# Patient Record
Sex: FEMALE | Race: WHITE | NOT HISPANIC OR LATINO | Employment: OTHER | URBAN - METROPOLITAN AREA
[De-identification: names, ages, dates, MRNs, and addresses within clinical notes are randomized per-mention and may not be internally consistent; named-entity substitution may affect disease eponyms.]

---

## 2017-06-24 ENCOUNTER — COMMUNICATION - HEALTHEAST (OUTPATIENT)
Dept: SCHEDULING | Facility: CLINIC | Age: 82
End: 2017-06-24

## 2017-06-24 DIAGNOSIS — I10 UNSPECIFIED ESSENTIAL HYPERTENSION: ICD-10-CM

## 2017-06-27 ENCOUNTER — COMMUNICATION - HEALTHEAST (OUTPATIENT)
Dept: INTERNAL MEDICINE | Facility: CLINIC | Age: 82
End: 2017-06-27

## 2017-06-29 ENCOUNTER — COMMUNICATION - HEALTHEAST (OUTPATIENT)
Dept: INTERNAL MEDICINE | Facility: CLINIC | Age: 82
End: 2017-06-29

## 2017-06-29 DIAGNOSIS — E78.5 HYPERLIPIDEMIA: ICD-10-CM

## 2017-07-06 ENCOUNTER — COMMUNICATION - HEALTHEAST (OUTPATIENT)
Dept: INTERNAL MEDICINE | Facility: CLINIC | Age: 82
End: 2017-07-06

## 2017-07-06 DIAGNOSIS — I10 ESSENTIAL HYPERTENSION: ICD-10-CM

## 2017-07-06 DIAGNOSIS — E78.5 HYPERLIPIDEMIA: ICD-10-CM

## 2017-10-06 ENCOUNTER — COMMUNICATION - HEALTHEAST (OUTPATIENT)
Dept: INTERNAL MEDICINE | Facility: CLINIC | Age: 82
End: 2017-10-06

## 2017-10-06 DIAGNOSIS — E78.5 HYPERLIPIDEMIA: ICD-10-CM

## 2017-10-17 ENCOUNTER — COMMUNICATION - HEALTHEAST (OUTPATIENT)
Dept: SCHEDULING | Facility: CLINIC | Age: 82
End: 2017-10-17

## 2017-10-17 DIAGNOSIS — I10 ESSENTIAL HYPERTENSION: ICD-10-CM

## 2017-12-08 ENCOUNTER — COMMUNICATION - HEALTHEAST (OUTPATIENT)
Dept: INTERNAL MEDICINE | Facility: CLINIC | Age: 82
End: 2017-12-08

## 2017-12-08 ENCOUNTER — AMBULATORY - HEALTHEAST (OUTPATIENT)
Dept: INTERNAL MEDICINE | Facility: CLINIC | Age: 82
End: 2017-12-08

## 2017-12-08 DIAGNOSIS — E78.5 HYPERLIPIDEMIA: ICD-10-CM

## 2018-06-06 ENCOUNTER — COMMUNICATION - HEALTHEAST (OUTPATIENT)
Dept: INTERNAL MEDICINE | Facility: CLINIC | Age: 83
End: 2018-06-06

## 2018-06-06 DIAGNOSIS — I10 ESSENTIAL HYPERTENSION: ICD-10-CM

## 2018-06-08 ENCOUNTER — COMMUNICATION - HEALTHEAST (OUTPATIENT)
Dept: INTERNAL MEDICINE | Facility: CLINIC | Age: 83
End: 2018-06-08

## 2018-06-11 ENCOUNTER — OFFICE VISIT - HEALTHEAST (OUTPATIENT)
Dept: INTERNAL MEDICINE | Facility: CLINIC | Age: 83
End: 2018-06-11

## 2018-06-11 DIAGNOSIS — I10 ESSENTIAL HYPERTENSION: ICD-10-CM

## 2018-06-11 DIAGNOSIS — G31.84 MILD COGNITIVE IMPAIRMENT WITH MEMORY LOSS: ICD-10-CM

## 2018-06-11 DIAGNOSIS — Z00.01 ENCOUNTER FOR GENERAL ADULT MEDICAL EXAMINATION WITH ABNORMAL FINDINGS: ICD-10-CM

## 2018-06-11 DIAGNOSIS — Z78.0 MENOPAUSE: ICD-10-CM

## 2018-06-11 DIAGNOSIS — E78.2 MIXED HYPERLIPIDEMIA: ICD-10-CM

## 2018-06-11 DIAGNOSIS — E55.9 VITAMIN D DEFICIENCY: ICD-10-CM

## 2018-06-11 LAB
ALBUMIN SERPL-MCNC: 4.2 G/DL (ref 3.5–5)
ALBUMIN UR-MCNC: NEGATIVE MG/DL
ALP SERPL-CCNC: 77 U/L (ref 45–120)
ALT SERPL W P-5'-P-CCNC: 23 U/L (ref 0–45)
ANION GAP SERPL CALCULATED.3IONS-SCNC: 6 MMOL/L (ref 5–18)
APPEARANCE UR: CLEAR
AST SERPL W P-5'-P-CCNC: 22 U/L (ref 0–40)
BILIRUB DIRECT SERPL-MCNC: 0.2 MG/DL
BILIRUB SERPL-MCNC: 0.6 MG/DL (ref 0–1)
BILIRUB UR QL STRIP: NEGATIVE
BUN SERPL-MCNC: 13 MG/DL (ref 8–28)
CALCIUM SERPL-MCNC: 9.7 MG/DL (ref 8.5–10.5)
CHLORIDE BLD-SCNC: 106 MMOL/L (ref 98–107)
CHOLEST SERPL-MCNC: 144 MG/DL
CO2 SERPL-SCNC: 29 MMOL/L (ref 22–31)
COLOR UR AUTO: YELLOW
CREAT SERPL-MCNC: 0.71 MG/DL (ref 0.6–1.1)
ERYTHROCYTE [DISTWIDTH] IN BLOOD BY AUTOMATED COUNT: 11.4 % (ref 11–14.5)
FASTING STATUS PATIENT QL REPORTED: YES
GFR SERPL CREATININE-BSD FRML MDRD: >60 ML/MIN/1.73M2
GLUCOSE BLD-MCNC: 98 MG/DL (ref 70–125)
GLUCOSE UR STRIP-MCNC: NEGATIVE MG/DL
HCT VFR BLD AUTO: 44.2 % (ref 35–47)
HDLC SERPL-MCNC: 53 MG/DL
HGB BLD-MCNC: 15.1 G/DL (ref 12–16)
HGB UR QL STRIP: NEGATIVE
KETONES UR STRIP-MCNC: NEGATIVE MG/DL
LDLC SERPL CALC-MCNC: 65 MG/DL
LEUKOCYTE ESTERASE UR QL STRIP: NEGATIVE
MCH RBC QN AUTO: 29.9 PG (ref 27–34)
MCHC RBC AUTO-ENTMCNC: 34.1 G/DL (ref 32–36)
MCV RBC AUTO: 88 FL (ref 80–100)
NITRATE UR QL: NEGATIVE
PH UR STRIP: 6.5 [PH] (ref 5–8)
PLATELET # BLD AUTO: 217 THOU/UL (ref 140–440)
PMV BLD AUTO: 7.8 FL (ref 7–10)
POTASSIUM BLD-SCNC: 4.7 MMOL/L (ref 3.5–5)
PROT SERPL-MCNC: 6.8 G/DL (ref 6–8)
RBC # BLD AUTO: 5.04 MILL/UL (ref 3.8–5.4)
SODIUM SERPL-SCNC: 141 MMOL/L (ref 136–145)
SP GR UR STRIP: 1.01 (ref 1–1.03)
TRIGL SERPL-MCNC: 130 MG/DL
TSH SERPL DL<=0.005 MIU/L-ACNC: 3.49 UIU/ML (ref 0.3–5)
UROBILINOGEN UR STRIP-ACNC: NORMAL
VIT B12 SERPL-MCNC: 484 PG/ML (ref 213–816)
WBC: 4.4 THOU/UL (ref 4–11)

## 2018-06-11 ASSESSMENT — MIFFLIN-ST. JEOR: SCORE: 1079.21

## 2018-06-12 ENCOUNTER — COMMUNICATION - HEALTHEAST (OUTPATIENT)
Dept: INTERNAL MEDICINE | Facility: CLINIC | Age: 83
End: 2018-06-12

## 2018-06-12 LAB — 25(OH)D3 SERPL-MCNC: 34.6 NG/ML (ref 30–80)

## 2018-06-22 ENCOUNTER — RECORDS - HEALTHEAST (OUTPATIENT)
Dept: BONE DENSITY | Facility: CLINIC | Age: 83
End: 2018-06-22

## 2018-06-22 ENCOUNTER — RECORDS - HEALTHEAST (OUTPATIENT)
Dept: ADMINISTRATIVE | Facility: OTHER | Age: 83
End: 2018-06-22

## 2018-06-22 DIAGNOSIS — Z78.0 ASYMPTOMATIC MENOPAUSAL STATE: ICD-10-CM

## 2018-06-25 ENCOUNTER — COMMUNICATION - HEALTHEAST (OUTPATIENT)
Dept: INTERNAL MEDICINE | Facility: CLINIC | Age: 83
End: 2018-06-25

## 2018-12-28 ENCOUNTER — COMMUNICATION - HEALTHEAST (OUTPATIENT)
Dept: INTERNAL MEDICINE | Facility: CLINIC | Age: 83
End: 2018-12-28

## 2018-12-28 DIAGNOSIS — E78.5 HYPERLIPIDEMIA: ICD-10-CM

## 2019-08-01 ENCOUNTER — OFFICE VISIT (OUTPATIENT)
Dept: FAMILY MEDICINE | Facility: CLINIC | Age: 84
End: 2019-08-01
Payer: MEDICARE

## 2019-08-01 VITALS
WEIGHT: 136 LBS | TEMPERATURE: 97.2 F | BODY MASS INDEX: 21.86 KG/M2 | SYSTOLIC BLOOD PRESSURE: 134 MMHG | OXYGEN SATURATION: 97 % | DIASTOLIC BLOOD PRESSURE: 81 MMHG | HEIGHT: 66 IN | HEART RATE: 77 BPM

## 2019-08-01 DIAGNOSIS — R41.89 COGNITIVE DECLINE: ICD-10-CM

## 2019-08-01 DIAGNOSIS — E78.5 HYPERLIPIDEMIA LDL GOAL <100: Primary | ICD-10-CM

## 2019-08-01 DIAGNOSIS — H61.23 BILATERAL IMPACTED CERUMEN: ICD-10-CM

## 2019-08-01 PROCEDURE — 99203 OFFICE O/P NEW LOW 30 MIN: CPT | Mod: 25 | Performed by: NURSE PRACTITIONER

## 2019-08-01 PROCEDURE — 69209 REMOVE IMPACTED EAR WAX UNI: CPT | Performed by: NURSE PRACTITIONER

## 2019-08-01 RX ORDER — LATANOPROST 50 UG/ML
SOLUTION/ DROPS OPHTHALMIC
Refills: 3 | COMMUNITY
Start: 2019-07-08

## 2019-08-01 RX ORDER — ALCOHOL 70.47 ML/100ML
1 GEL TOPICAL DAILY
COMMUNITY

## 2019-08-01 RX ORDER — ATORVASTATIN CALCIUM 40 MG/1
20 TABLET, FILM COATED ORAL
COMMUNITY
Start: 2018-12-28 | End: 2019-08-01

## 2019-08-01 RX ORDER — ATORVASTATIN CALCIUM 40 MG/1
20 TABLET, FILM COATED ORAL DAILY
Qty: 90 TABLET | Refills: 0 | Status: SHIPPED | OUTPATIENT
Start: 2019-08-01 | End: 2022-01-06

## 2019-08-01 RX ORDER — DORZOLAMIDE HYDROCHLORIDE AND TIMOLOL MALEATE 20; 5 MG/ML; MG/ML
1 SOLUTION/ DROPS OPHTHALMIC 2 TIMES DAILY
Refills: 1 | COMMUNITY
Start: 2019-03-30

## 2019-08-01 ASSESSMENT — MIFFLIN-ST. JEOR: SCORE: 1092.61

## 2019-08-01 NOTE — PROGRESS NOTES
"Subjective     Khloe Sparks is a 83 year old female who presents to clinic today for the following health issues:    HPI   Medication Followup of Atorvastatin 40mg 1/2 daily    Taking Medication as prescribed: yes    Side Effects:  None    Medication Helping Symptoms:  yes     Khloe is needing to re establish with a PCP and will schedule that appt before leaving today  She has some confusion about location of her current pharmacy - \"memory's not too good\"  Currently lives with adult son who drives her to appts - he works weekends  Wants to move into [address] [place] [address]- encouraged to sign up now- friends live there  Bowls on Wed          Patient Active Problem List   Diagnosis     Aftercare for healing traumatic fracture of upper arm     Pain in joint, shoulder region     History reviewed. No pertinent surgical history.    Social History     Tobacco Use     Smoking status: Former Smoker     Smokeless tobacco: Never Used     Tobacco comment: quit 1982    Substance Use Topics     Alcohol use: Yes     Comment: wine occassionally     History reviewed. No pertinent family history.      Current Outpatient Medications   Medication Sig Dispense Refill     atorvastatin (LIPITOR) 40 MG tablet Take 0.5 tablets (20 mg) by mouth daily 90 tablet 0     dorzolamide-timolol (COSOPT) 2-0.5 % ophthalmic solution   1     latanoprost (XALATAN) 0.005 % ophthalmic solution instill 1 drop in both eyes at bedtime  3     LISINOPRIL PO Take 10 mg by mouth daily.       multivitamin (THERMEMS) TABS Take 1 tablet by mouth       No Known Allergies    Reviewed and updated as needed this visit by Provider         Review of Systems   ROS COMP: Constitutional, HEENT, cardiovascular, pulmonary, gi and gu systems are negative, except as otherwise noted.        Objective       /81 (BP Location: Right arm, Cuff Size: Adult Regular)   Pulse 77   Temp 97.2  F (36.2  C) (Tympanic)   Ht 1.683 m (5' 6.25\")   Wt 61.7 kg (136 lb)   SpO2 97%   BMI 21.79 " kg/m      Physical Exam   GENERAL: healthy, alert and no distress  EYES: Eyes grossly normal to inspection, PERRL and conjunctivae and sclerae normal  HENT: ear canals cerumen occluded and lavaged by MA and TM's normal, nose and mouth without ulcers or lesions  NECK: no adenopathy, no asymmetry, masses, or scars and thyroid normal to palpation  RESP: lungs clear to auscultation - no rales, rhonchi or wheezes  CV: regular rate and rhythm, normal S1 S2, no S3 or S4, no murmur, click or rub, no peripheral edema and peripheral pulses strong  MS: no gross musculoskeletal defects noted, no edema  PSYCH: mild cognitive decline    Diagnostic Test Results:  Labs reviewed in Epic        Assessment & Plan       ICD-10-CM    1. Hyperlipidemia LDL goal <100 E78.5 atorvastatin (LIPITOR) 40 MG tablet     Lipid panel reflex to direct LDL Non-fasting   2. Bilateral impacted cerumen H61.23 REMOVE IMPACTED CERUMEN   3. Cognitive decline R41.89      RAVINDRA Capps [facility]

## 2019-08-09 NOTE — NURSING NOTE
Patient identified using two patient identifiers.  Ear exam showing wax occlusion completed by provider.  Solution: warm water was placed in the bilateral ear(s) via irrigation tool: elephant ear.    Ruthie Rodriguez MA

## 2019-11-26 ENCOUNTER — OFFICE VISIT - HEALTHEAST (OUTPATIENT)
Dept: INTERNAL MEDICINE | Facility: CLINIC | Age: 84
End: 2019-11-26

## 2019-11-26 DIAGNOSIS — I10 ESSENTIAL HYPERTENSION: ICD-10-CM

## 2019-11-26 DIAGNOSIS — R41.3 MEMORY LOSS: ICD-10-CM

## 2019-11-26 DIAGNOSIS — E78.5 HYPERLIPIDEMIA: ICD-10-CM

## 2019-11-26 DIAGNOSIS — R21 RASH: ICD-10-CM

## 2019-11-26 LAB
ALBUMIN SERPL-MCNC: 4.2 G/DL (ref 3.5–5)
ALP SERPL-CCNC: 85 U/L (ref 45–120)
ALT SERPL W P-5'-P-CCNC: 20 U/L (ref 0–45)
ANION GAP SERPL CALCULATED.3IONS-SCNC: 10 MMOL/L (ref 5–18)
AST SERPL W P-5'-P-CCNC: 20 U/L (ref 0–40)
BILIRUB SERPL-MCNC: 0.4 MG/DL (ref 0–1)
BUN SERPL-MCNC: 9 MG/DL (ref 8–28)
CALCIUM SERPL-MCNC: 9.6 MG/DL (ref 8.5–10.5)
CHLORIDE BLD-SCNC: 104 MMOL/L (ref 98–107)
CO2 SERPL-SCNC: 25 MMOL/L (ref 22–31)
CREAT SERPL-MCNC: 0.73 MG/DL (ref 0.6–1.1)
ERYTHROCYTE [DISTWIDTH] IN BLOOD BY AUTOMATED COUNT: 12.7 % (ref 11–14.5)
GFR SERPL CREATININE-BSD FRML MDRD: >60 ML/MIN/1.73M2
GLUCOSE BLD-MCNC: 99 MG/DL (ref 70–125)
HCT VFR BLD AUTO: 45.2 % (ref 35–47)
HGB BLD-MCNC: 14.7 G/DL (ref 12–16)
MCH RBC QN AUTO: 29 PG (ref 27–34)
MCHC RBC AUTO-ENTMCNC: 32.5 G/DL (ref 32–36)
MCV RBC AUTO: 89 FL (ref 80–100)
PLATELET # BLD AUTO: 251 THOU/UL (ref 140–440)
PMV BLD AUTO: 10.6 FL (ref 8.5–12.5)
POTASSIUM BLD-SCNC: 4.8 MMOL/L (ref 3.5–5)
PROT SERPL-MCNC: 6.6 G/DL (ref 6–8)
RBC # BLD AUTO: 5.07 MILL/UL (ref 3.8–5.4)
SODIUM SERPL-SCNC: 139 MMOL/L (ref 136–145)
TSH SERPL DL<=0.005 MIU/L-ACNC: 3.62 UIU/ML (ref 0.3–5)
VIT B12 SERPL-MCNC: 576 PG/ML (ref 213–816)
WBC: 4.8 THOU/UL (ref 4–11)

## 2019-11-26 ASSESSMENT — MIFFLIN-ST. JEOR: SCORE: 1078.64

## 2019-11-27 ENCOUNTER — COMMUNICATION - HEALTHEAST (OUTPATIENT)
Dept: INTERNAL MEDICINE | Facility: CLINIC | Age: 84
End: 2019-11-27

## 2020-11-28 ENCOUNTER — COMMUNICATION - HEALTHEAST (OUTPATIENT)
Dept: INTERNAL MEDICINE | Facility: CLINIC | Age: 85
End: 2020-11-28

## 2020-11-28 DIAGNOSIS — I10 ESSENTIAL HYPERTENSION: ICD-10-CM

## 2020-12-07 ENCOUNTER — COMMUNICATION - HEALTHEAST (OUTPATIENT)
Dept: INTERNAL MEDICINE | Facility: CLINIC | Age: 85
End: 2020-12-07

## 2020-12-07 DIAGNOSIS — E78.5 HYPERLIPIDEMIA: ICD-10-CM

## 2020-12-07 DIAGNOSIS — R41.3 MEMORY LOSS: ICD-10-CM

## 2021-05-29 ENCOUNTER — RECORDS - HEALTHEAST (OUTPATIENT)
Dept: ADMINISTRATIVE | Facility: CLINIC | Age: 86
End: 2021-05-29

## 2021-05-31 ENCOUNTER — RECORDS - HEALTHEAST (OUTPATIENT)
Dept: ADMINISTRATIVE | Facility: CLINIC | Age: 86
End: 2021-05-31

## 2021-06-01 VITALS — WEIGHT: 136.13 LBS | HEIGHT: 66 IN | BODY MASS INDEX: 21.88 KG/M2

## 2021-06-02 ENCOUNTER — RECORDS - HEALTHEAST (OUTPATIENT)
Dept: ADMINISTRATIVE | Facility: CLINIC | Age: 86
End: 2021-06-02

## 2021-06-03 NOTE — PROGRESS NOTES
Office Visit   Khloe Sparks   84 y.o. female    Date of Visit: 11/26/2019    Chief Complaint   Patient presents with     Get established visit        Assessment and Plan   1. Essential hypertension  She has not been taking her blood pressure medicine.  I am going to restart the lisinopril at 10 mg daily.  We will also check blood work today.  We will see her back in about 6 weeks for an annual physical and reassess at that time.  She is not having any cardiac symptoms.  - lisinopril (PRINIVIL,ZESTRIL) 10 MG tablet; Take 1 tablet (10 mg total) by mouth daily.  Dispense: 90 tablet; Refill: 3  - HM2(CBC w/o Differential)  - Comprehensive Metabolic Panel  - Thyroid Cascade  - Vitamin B12    2. Hyperlipidemia  Have advised her to come back for her physical in 6 weeks fasting.  We will need to recheck her lipids at that time.  - atorvastatin (LIPITOR) 40 MG tablet; Take 0.5 tablets (20 mg total) by mouth daily.  Dispense: 45 tablet; Refill: 3    3. Memory loss  Per her son this seems to be worsening.  She has not been on medication in the past but would like to try the donepezil.  I am also going to refer her to the dementia clinic and recheck some blood work today.  Will get back to her with the results.  - donepezil (ARICEPT) 5 MG tablet; Take 1 tablet (5 mg total) by mouth daily.  Dispense: 90 tablet; Refill: 3  - Ambulatory referral to Dementia/Memory Loss Clinic  - HM2(CBC w/o Differential)  - Comprehensive Metabolic Panel  - Thyroid Cascade  - Vitamin B12    4. Rash  We will reassess at her follow-up.  If is not improving then may need to have her see dermatology.  - betamethasone dipropionate (DIPROLENE) 0.05 % cream; Apply topically 2 (two) times a day. For rash  Dispense: 30 g; Refill: 5         Return in about 6 weeks (around 1/7/2020) for Annual physical.     History of Present Illness   This 84 y.o. old female comes in to establish care.  She has a history of hypertension and hyperlipidemia.  She has no  "history of cardiac disease.  She has not been getting her blood pressure medicine recently and her blood pressure is a little bit elevated today.  She has not had any chest pain or palpitations but would like to get back on her medicine.  Her son notes that they have noticed increasing memory loss.  In the past she was prescribed Aricept but did not start it.  She is agreeable to starting it again at this time.  We also discussed further evaluation of her memory loss and they would like to proceed with that.  She has a rash on her legs that has been improved in the past with betamethasone.  She is interested in getting a refill on that.  She has no other acute concerns today.  Otherwise she stays quite active.    Review of Systems: As above, systems otherwise reviewed and negative.     Medications, Allergies and Problem List   Patient Active Problem List   Diagnosis     Osteopenia     Hypertension     Hyperlipidemia     Unspecified glaucoma     Memory loss     Current Outpatient Medications   Medication Sig Dispense Refill     atorvastatin (LIPITOR) 40 MG tablet Take 0.5 tablets (20 mg total) by mouth daily. 45 tablet 3     betamethasone dipropionate (DIPROLENE) 0.05 % cream Apply topically 2 (two) times a day. For rash 30 g 5     donepezil (ARICEPT) 5 MG tablet Take 1 tablet (5 mg total) by mouth daily. 90 tablet 3     lisinopril (PRINIVIL,ZESTRIL) 10 MG tablet Take 1 tablet (10 mg total) by mouth daily. 90 tablet 3     multivitamin therapeutic (THERAGRAN) tablet Take 1 tablet by mouth daily.       timolol maleate (TIMOPTIC) 0.5 % ophthalmic solution        No current facility-administered medications for this visit.      No Known Allergies       Physical Exam     /80   Pulse 67   Ht 5' 6\" (1.676 m)   Wt 136 lb (61.7 kg)   BMI 21.95 kg/m      General:  Patient is alert and in no apparent distress.  Neck:  Supple with no adenopathy or thyroid abnormality noted.  Cardiovascular:  Regular rate and rhythm, " normal S1/S2, no murmurs, rubs, or gallop.  Pulmonary:  Lungs are clear to auscultation bilaterally with normal respiratory effort.  Gastrointestinal:  Abdomen is soft, non-tender, non-distended, with no organomegaly, rebound or guarding.           Additional Information   Social History     Tobacco Use     Smoking status: Former Smoker     Last attempt to quit: 1975     Years since quittin.3     Smokeless tobacco: Never Used   Substance Use Topics     Alcohol use: Yes     Comment: glass per week     Drug use: No        Gina Jackson MD

## 2021-06-04 VITALS
HEART RATE: 67 BPM | SYSTOLIC BLOOD PRESSURE: 142 MMHG | WEIGHT: 136 LBS | BODY MASS INDEX: 21.86 KG/M2 | HEIGHT: 66 IN | DIASTOLIC BLOOD PRESSURE: 80 MMHG

## 2021-06-13 NOTE — TELEPHONE ENCOUNTER
RN cannot approve Refill Request    RN can NOT refill this medication PCP messaged that patient is overdue for Labs. Last office visit: 11/26/2019 Gina Jackson MD Last Physical: Visit date not found Last MTM visit: Visit date not found Last visit same specialty: 11/26/2019 Gina Jackson MD.  Next visit within 3 mo: Visit date not found  Next physical within 3 mo: Visit date not found      Lavonne Workman, [facility] Triage/Med Refill 11/28/2020    Requested Prescriptions   Pending Prescriptions Disp Refills     lisinopriL (PRINIVIL,ZESTRIL) 10 MG tablet [Pharmacy Med Name: Lisinopril Oral Tablet 10 MG] 90 tablet 0     Sig: Take 1 tablet (10 mg total) by mouth daily.       Ace Inhibitors Refill Protocol Failed - 11/28/2020  7:20 PM        Failed - PCP or prescribing provider visit in past 12 months       Last office visit with prescriber/PCP: 11/26/2019 Gina Jackson MD OR same dept: Visit date not found OR same specialty: 11/26/2019 Gina Jackson MD  Last physical: Visit date not found Last MTM visit: Visit date not found   Next visit within 3 mo: Visit date not found  Next physical within 3 mo: Visit date not found  Prescriber OR PCP: Gina Jackson MD  Last diagnosis associated with med order: 1. Essential hypertension  - lisinopriL (PRINIVIL,ZESTRIL) 10 MG tablet [Pharmacy Med Name: Lisinopril Oral Tablet 10 MG]; Take 1 tablet (10 mg total) by mouth daily.  Dispense: 90 tablet; Refill: 0    If protocol passes may refill for 12 months if within 3 months of last provider visit (or a total of 15 months).             Failed - Serum Potassium in past 12 months     No results found for: LN-POTASSIUM          Failed - Blood pressure filed in past 12 months     BP Readings from Last 1 Encounters:   11/26/19 142/80             Failed - Serum Creatinine in past 12 months     Creatinine   Date Value Ref Range Status   11/26/2019 0.73 0.60 - 1.10 mg/dL Final

## 2021-06-13 NOTE — TELEPHONE ENCOUNTER
RN cannot approve Refill Request    RN can NOT refill this medication Protocol failed and NO refill given. Last office visit: 11/26/2019 Gina Jackson MD Last Physical: Visit date not found Last MTM visit: Visit date not found Last visit same specialty: 11/26/2019 Gina Jackson MD.  Next visit within 3 mo: Visit date not found  Next physical within 3 mo: Visit date not found      Cori Pinzon, [facility] Triage/Med Refill 12/8/2020    Requested Prescriptions   Pending Prescriptions Disp Refills     atorvastatin (LIPITOR) 40 MG tablet [Pharmacy Med Name: Atorvastatin Calcium Oral Tablet 40 MG] 45 tablet 0     Sig: Take 1/2 tablets (20 mg total) by mouth daily.       Statins Refill Protocol (Hmg CoA Reductase Inhibitors) Failed - 12/7/2020  7:02 PM        Failed - PCP or prescribing provider visit in past 12 months      Last office visit with prescriber/PCP: 11/26/2019 Gina Jackson MD OR same dept: Visit date not found OR same specialty: 11/26/2019 Gina Jackson MD  Last physical: Visit date not found Last MTM visit: Visit date not found   Next visit within 3 mo: Visit date not found  Next physical within 3 mo: Visit date not found  Prescriber OR PCP: Gina Jackson MD  Last diagnosis associated with med order: 1. Hyperlipidemia  - atorvastatin (LIPITOR) 40 MG tablet [Pharmacy Med Name: Atorvastatin Calcium Oral Tablet 40 MG]; Take 1/2 tablets (20 mg total) by mouth daily.  Dispense: 45 tablet; Refill: 0    2. Memory loss  - donepeziL (ARICEPT) 5 MG tablet [Pharmacy Med Name: Donepezil HCl Oral Tablet 5 MG]; Take 1 tablet (5 mg total) by mouth daily.  Dispense: 90 tablet; Refill: 0    If protocol passes may refill for 12 months if within 3 months of last provider visit (or a total of 15 months).                donepeziL (ARICEPT) 5 MG tablet [Pharmacy Med Name: Donepezil HCl Oral Tablet 5 MG] 90 tablet 0     Sig: Take 1 tablet (5 mg total) by mouth daily.       Cholinesterase  Inhibitor/Anti-Alzheimer Agent Refill Protocol Failed - 12/7/2020  7:02 PM        Failed - Visit with PCP or prescribing provider visit in last 6 months or next 3 months     Last office visit with prescriber/PCP: Visit date not found OR same dept: Visit date not found OR same specialty: 11/26/2019 Gina Jackson MD Last physical: Visit date not found Last MTM visit: Visit date not found     Next appt within 3 mo: Visit date not found  Next physical within 3 mo: Visit date not found  Prescriber OR PCP: Gina Jackson MD  Last diagnosis associated with med order: 1. Hyperlipidemia  - atorvastatin (LIPITOR) 40 MG tablet [Pharmacy Med Name: Atorvastatin Calcium Oral Tablet 40 MG]; Take 1/2 tablets (20 mg total) by mouth daily.  Dispense: 45 tablet; Refill: 0    2. Memory loss  - donepeziL (ARICEPT) 5 MG tablet [Pharmacy Med Name: Donepezil HCl Oral Tablet 5 MG]; Take 1 tablet (5 mg total) by mouth daily.  Dispense: 90 tablet; Refill: 0    If protocol passes may refill for 6 months if within 3 months of last provider visit (or a total of 9 months).

## 2021-06-18 NOTE — PROGRESS NOTES
Assessment and Plan:   Overall doing fairly well since her   a little over 3 years ago but admits she misses him.  1 of her sons lives with her.  She admits her memory is not as good as it used to be.  She mainly forgets the names of her neighbors.    1. Hypertension  Stable on medication.  - Basic Metabolic Panel  - HM2(CBC w/o Differential)  - Thyroid Stimulating Hormone (TSH)  - Urinalysis-UC if Indicated    2. Mixed hyperlipidemia  She is on atorvastatin.  - Hepatic Profile  - Lipid Cascade    3. Menopause  Due for a bone density test.  - DXA Bone Density Scan; Future    4. Vitamin D deficiency  - Vitamin D, Total (25-Hydroxy)    5. Mild cognitive impairment with memory loss  She agrees to starting on donepezil, she was started at 5 mg daily.  Follow-up with me in 6 weeks.  - Vitamin B12    6. Encounter for general adult medical examination with abnormal findings     The patient's current medical problems were reviewed.    The following health maintenance schedule was reviewed with the patient and provided in printed form in the after visit summary:   Health Maintenance   Topic Date Due     ZOSTER VACCINE  10/19/1995     DXA SCAN  10/19/2000     PNEUMOCOCCAL CONJUGATE VACCINE FOR ADULTS (PCV13 OR PREVNAR)  10/19/2000     TD 18+ HE  2018     INFLUENZA VACCINE RULE BASED (Season Ended) 2018     FALL RISK ASSESSMENT  2019     ADVANCE DIRECTIVES DISCUSSED WITH PATIENT  2020     PNEUMOCOCCAL POLYSACCHARIDE VACCINE AGE 65 AND OVER  Completed        Subjective:   Chief Complaint: Khloe Sparks is an 82 y.o. female here for an Annual Wellness visit.   HPI: Khloe is doing fairly well.  She does admit she has had some mild memory loss, mainly names.  She enjoys gardening and working outside.  She remains active.  She does not drive anymore.    Review of Systems:   Please see above.  The rest of the review of systems are negative for all systems.    Patient Care Team:    MD Bhupendra as PCP - General     Patient Active Problem List   Diagnosis     Basal Cell Carcinoma Of The Skin     Osteopenia     Hypertension     Mixed hyperlipidemia     Glaucoma     Lichen Planus     No past medical history on file.   Past Surgical History:   Procedure Laterality Date     [place] CLOSED RX DIST RAD/ULNA FX,MANIPUL      Description: Closed Treatment Of Fracture Of Distal Radius, Manipulation;  Proc Date: 2002;  Comments: fell from ladder     [place] CLOSED RX MID HUMERUS FRACTURE      Description: Closed Treat Of Fracture Of The Humeral Shaft;  Proc Date: 2007;  Comments: fell from wall     [place] REMOVE TONSILS/ADENOIDS,<13 Y/O      Description: Tonsillectomy With Adenoidectomy;  Recorded: 2010;      Family History   Problem Relation Age of Onset     Breast cancer Sister       of this but had MS as well     Pancreatic cancer Sister       age 79     Multiple sclerosis Mother       age 80     Pancreatic cancer Father       age 70     Cancer Brother      laryngeal cancer  in his 60s      Social History     Social History     Marital status:      Spouse name: N/A     Number of children: N/A     Years of education: N/A     Occupational History     Not on file.     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. She was  for almost 60 years to Eric and he  3/2016 (he was 83) while taking a nap next to her.  She has 3 boys, and 4 grandchildren.  She has a glass of red wine almost daily.  One son lives with her.      Current Outpatient Prescriptions   Medication Sig Dispense Refill     atorvastatin (LIPITOR) 40 MG tablet Take 0.5 tablets (20 mg total) by mouth daily. 90 tablet 5     betamethasone  "dipropionate (DIPROLENE) 0.05 % cream Apply topically 2 (two) times a day.       lisinopril (PRINIVIL,ZESTRIL) 10 MG tablet Take 5 mg in am and 5 mg in pm if BP is 100. Take 10 mg tablet if BP is 120> 90 tablet 2     lisinopril (PRINIVIL,ZESTRIL) 10 MG tablet Take 1 tablet (10 mg total) by mouth 2 (two) times a day. 180 tablet 3     multivitamin therapeutic (THERAGRAN) tablet Take 1 tablet by mouth daily.       timolol maleate (TIMOPTIC) 0.5 % ophthalmic solution        donepezil (ARICEPT) 5 MG tablet Take 1 tablet (5 mg total) by mouth daily. 90 tablet 2     No current facility-administered medications for this visit.       Objective:   Vital Signs:   Visit Vitals     /76     Pulse 64     Ht 5' 6\" (1.676 m)     Wt 136 lb 2 oz (61.7 kg)     SpO2 98%     BMI 21.97 kg/m2        VisionScreening:  No exam data present     PHYSICAL EXAM  Wt Readings from Last 3 Encounters:   06/11/18 136 lb 2 oz (61.7 kg)   04/08/16 140 lb (63.5 kg)   07/30/15 139 lb 6.4 oz (63.2 kg)     General Appearance: Pleasant and alert  HEENT: Sclera are clear, tympanic membranes clear  Lungs: Normal respirations, clear to auscultation  Breasts: Normal-appearing breasts and nipples with no axillary adenopathy   Cardiac: Regular rate and rhythm with no appreciable murmur rub or gallop   Abdomen: Soft and nondistended  Extremities: No edema  Skin: No rashes  Neuro: Moves all extremities and has facial symmetry  Gait: Ambulates with a normal gait    Personalized prevention plan: Lifestyle interventions to promote self-management and wellness were discussed including good nutrition, ongoing physical activity, falls prevention and continuing with screening tests as recommended including pneumonia vaccine and bone density test and those listed in the health maintenance plan listed above.    Much or all of the text in this note was generated through the use of Dragon Dictate voice-to-text software. Errors in spelling or words which seem out of " context are unintentional. Sound alike errors, in particular, may have escaped editing.      Assessment Results 6/11/2018   Activities of Daily Living No help needed   Instrumental Activities of Daily Living 2-4 - Moderate impairment   Get Up and Go Score Less than 12 seconds   Mini Cog Total Score 3   Some recent data might be hidden     A Mini-Cog score of 0-2 suggests the possibility of dementia, score of 3-5 suggests no dementia    Identified Health Risks:     She is at risk for lack of exercise and has been provided with information to increase physical activity for the benefit of her well-being.  The patient reports that she has difficulty with instrumental activities of daily living.  She was provided with a list of local organizations that provide support services and advised to make a follow up appointment in 6 weeks weeks to address this further.   The patient was provided with written information regarding signs of hearing loss.  She is at risk for falling and has been provided with information to reduce the risk of falling at home.  Information regarding advance directives (living carter), including where she can download the appropriate form, was provided to the patient via the AVS.

## 2021-06-19 NOTE — LETTER
Letter by Gina Jackson MD at      Author: Gina Jackson MD Service: -- Author Type: --    Filed:  Encounter Date: 11/27/2019 Status: Signed         Khloe Sparks  [address] [facility] [address]             November 27, 2019         Dear Ms. Sparks,    Below are the results from your recent visit:    Resulted Orders   HM2(CBC w/o Differential)   Result Value Ref Range    WBC 4.8 4.0 - 11.0 thou/uL    RBC 5.07 3.80 - 5.40 mill/uL    Hemoglobin 14.7 12.0 - 16.0 g/dL    Hematocrit 45.2 35.0 - 47.0 %    MCV 89 80 - 100 fL    MCH 29.0 27.0 - 34.0 pg    MCHC 32.5 32.0 - 36.0 g/dL    RDW 12.7 11.0 - 14.5 %    Platelets 251 140 - 440 thou/uL    MPV 10.6 8.5 - 12.5 fL   Comprehensive Metabolic Panel   Result Value Ref Range    Sodium 139 136 - 145 mmol/L    Potassium 4.8 3.5 - 5.0 mmol/L    Chloride 104 98 - 107 mmol/L    CO2 25 22 - 31 mmol/L    Anion Gap, Calculation 10 5 - 18 mmol/L    Glucose 99 70 - 125 mg/dL    BUN 9 8 - 28 mg/dL    Creatinine 0.73 0.60 - 1.10 mg/dL    GFR MDRD Af Amer >60 >60 mL/min/1.73m2    GFR MDRD Non Af Amer >60 >60 mL/min/1.73m2    Bilirubin, Total 0.4 0.0 - 1.0 mg/dL    Calcium 9.6 8.5 - 10.5 mg/dL    Protein, Total 6.6 6.0 - 8.0 g/dL    Albumin 4.2 3.5 - 5.0 g/dL    Alkaline Phosphatase 85 45 - 120 U/L    AST 20 0 - 40 U/L    ALT 20 0 - 45 U/L    Narrative    Fasting Glucose reference range is 70-99 mg/dL per  American Diabetes Association (ADA) guidelines.   Thyroid Cascade   Result Value Ref Range    TSH 3.62 0.30 - 5.00 uIU/mL   Vitamin B12   Result Value Ref Range    Vitamin B-12 576 213 - 816 pg/mL       Jeff Ledbetter.  Your labs this week were satisfactory.  Please let me know if you have any questions.      Sincerely,        Electronically signed by Gina Jackson MD

## 2021-07-03 NOTE — ADDENDUM NOTE
Addendum Note by Viviana Waldrop RN at 6/27/2017  8:56 AM     Author: Viviana Waldrop RN Service: -- Author Type: Registered Nurse    Filed: 6/27/2017  8:56 AM Encounter Date: 6/24/2017 Status: Signed    : Viviana Waldrop RN (Registered Nurse)    Addended by: VIVIANA WALDROP on: 6/27/2017 08:56 AM        Modules accepted: Orders

## 2021-07-03 NOTE — ADDENDUM NOTE
Addendum Note by Noah Berg MA at 6/26/2017 10:31 AM     Author: Noah Berg MA Service: -- Author Type: Medical Assistant    Filed: 6/26/2017 10:31 AM Encounter Date: 6/24/2017 Status: Signed    : Noah Berg MA (Medical Assistant)    Addended by: NOAH BERG on: 6/26/2017 10:31 AM        Modules accepted: Orders

## 2021-12-29 DIAGNOSIS — E78.5 HYPERLIPIDEMIA LDL GOAL <100: ICD-10-CM

## 2021-12-30 RX ORDER — DONEPEZIL HYDROCHLORIDE 5 MG/1
5 TABLET, FILM COATED ORAL AT BEDTIME
Qty: 30 TABLET | Refills: 0 | OUTPATIENT
Start: 2021-12-30

## 2021-12-30 RX ORDER — ATORVASTATIN CALCIUM 40 MG/1
20 TABLET, FILM COATED ORAL DAILY
Qty: 30 TABLET | Refills: 0 | OUTPATIENT
Start: 2021-12-30

## 2021-12-30 NOTE — TELEPHONE ENCOUNTER
Donepezil is historical.    [facility] has been filling these meds, but they were prescribed by a Dr. Jackson but that doctor last saw pt 2019, around the same time pt was seen by puneet landis.    Has upcoming visit with DR. Akbar, but has no showed to 2 appts scheduled here.   Most recent Dr. Luke 8/2021.    Pended 1 month each if you wish to fill for the donepezil and atorvastatin.  Scheduled q1 week for est care/px with Dr. Raffy Huber, RN  [facility] RN Triage Team

## 2022-01-04 ENCOUNTER — TELEPHONE (OUTPATIENT)
Dept: FAMILY MEDICINE | Facility: CLINIC | Age: 87
End: 2022-01-04
Payer: MEDICARE

## 2022-01-04 DIAGNOSIS — E78.5 HYPERLIPIDEMIA LDL GOAL <100: ICD-10-CM

## 2022-01-04 NOTE — TELEPHONE ENCOUNTER
Son calling to request refills for Atorvastatin and Donepezil. See encounter 12/29/21. Refill requests denied due to 2 past no shows.     Son will bring patient to upcoming appt 1/6/22.

## 2022-01-06 ENCOUNTER — OFFICE VISIT (OUTPATIENT)
Dept: FAMILY MEDICINE | Facility: CLINIC | Age: 87
End: 2022-01-06
Payer: MEDICARE

## 2022-01-06 VITALS
WEIGHT: 123 LBS | TEMPERATURE: 97.6 F | BODY MASS INDEX: 19.77 KG/M2 | HEIGHT: 66 IN | HEART RATE: 60 BPM | OXYGEN SATURATION: 100 % | DIASTOLIC BLOOD PRESSURE: 71 MMHG | RESPIRATION RATE: 16 BRPM | SYSTOLIC BLOOD PRESSURE: 117 MMHG

## 2022-01-06 DIAGNOSIS — Z00.00 ENCOUNTER FOR MEDICARE ANNUAL WELLNESS EXAM: Primary | ICD-10-CM

## 2022-01-06 DIAGNOSIS — R21 RASH: ICD-10-CM

## 2022-01-06 DIAGNOSIS — I10 PRIMARY HYPERTENSION: ICD-10-CM

## 2022-01-06 DIAGNOSIS — G30.9 ALZHEIMER'S DISEASE (H): ICD-10-CM

## 2022-01-06 DIAGNOSIS — F02.80 ALZHEIMER'S DISEASE (H): ICD-10-CM

## 2022-01-06 DIAGNOSIS — E78.5 HYPERLIPIDEMIA LDL GOAL <100: ICD-10-CM

## 2022-01-06 DIAGNOSIS — Z23 NEED FOR PROPHYLACTIC VACCINATION AND INOCULATION AGAINST INFLUENZA: ICD-10-CM

## 2022-01-06 PROCEDURE — 90662 IIV NO PRSV INCREASED AG IM: CPT | Performed by: INTERNAL MEDICINE

## 2022-01-06 PROCEDURE — G0439 PPPS, SUBSEQ VISIT: HCPCS | Performed by: INTERNAL MEDICINE

## 2022-01-06 PROCEDURE — G0008 ADMIN INFLUENZA VIRUS VAC: HCPCS | Performed by: INTERNAL MEDICINE

## 2022-01-06 RX ORDER — BENZOCAINE/MENTHOL 6 MG-10 MG
LOZENGE MUCOUS MEMBRANE 2 TIMES DAILY
Qty: 30 G | Refills: 3 | Status: SHIPPED | OUTPATIENT
Start: 2022-01-06 | End: 2022-06-30

## 2022-01-06 RX ORDER — LISINOPRIL 10 MG/1
10 TABLET ORAL DAILY
Qty: 90 TABLET | Refills: 3 | Status: SHIPPED | OUTPATIENT
Start: 2022-01-06 | End: 2024-01-01

## 2022-01-06 RX ORDER — DONEPEZIL HYDROCHLORIDE 5 MG/1
5 TABLET, FILM COATED ORAL
COMMUNITY
Start: 2020-12-08 | End: 2022-01-06

## 2022-01-06 RX ORDER — DONEPEZIL HYDROCHLORIDE 5 MG/1
5 TABLET, FILM COATED ORAL AT BEDTIME
Qty: 90 TABLET | Refills: 3 | Status: SHIPPED | OUTPATIENT
Start: 2022-01-06 | End: 2024-01-01

## 2022-01-06 RX ORDER — ATORVASTATIN CALCIUM 40 MG/1
20 TABLET, FILM COATED ORAL DAILY
Qty: 90 TABLET | Refills: 3 | Status: SHIPPED | OUTPATIENT
Start: 2022-01-06

## 2022-01-06 ASSESSMENT — ACTIVITIES OF DAILY LIVING (ADL): CURRENT_FUNCTION: HOUSEWORK REQUIRES ASSISTANCE

## 2022-01-06 ASSESSMENT — MIFFLIN-ST. JEOR: SCORE: 1014.67

## 2022-01-06 NOTE — PATIENT INSTRUCTIONS
Patient Education   Personalized Prevention Plan  You are due for the preventive services outlined below.  Your care team is available to assist you in scheduling these services.  If you have already completed any of these items, please share that information with your care team to update in your medical record.  Health Maintenance Due   Topic Date Due     ANNUAL REVIEW OF HM ORDERS  Never done     Zoster (Shingles) Vaccine (1 of 2) Never done     Diptheria Tetanus Pertussis (DTAP/TDAP/TD) Vaccine (1 - Tdap) 07/10/2008     FALL RISK ASSESSMENT  11/26/2020     Flu Vaccine (1) 09/01/2021     COVID-19 Vaccine (3 - Booster for Moderna series) 11/21/2021     Activities of Daily Living    Your Health Risk Assessment indicates you have difficulties with activities of daily living such as housework, bathing, preparing meals, taking medication, etc. Please make a follow up appointment for us to address this issue in more detail.

## 2022-01-06 NOTE — PROGRESS NOTES
hy    The patient reports that she has difficulty with activities of daily living. I have asked that the patient make a follow up appointment in *** weeks where this issue will be further evaluated and addressed.

## 2022-01-06 NOTE — PROGRESS NOTES
"SUBJECTIVE:   Khloe Sparks is a 86 year old female who presents for Preventive Visit.      Patient has been advised of split billing requirements and indicates understanding: Yes   Are you in the first 12 months of your Medicare coverage?  No    Healthy Habits:    In general, how would you rate your overall health?  Good    Frequency of exercise:  6-7 days/week    Duration of exercise:  15-30 minutes    Do you usually eat at least 4 servings of fruit and vegetables a day, include whole grains    & fiber and avoid regularly eating high fat or \"junk\" foods?  Yes    Taking medications regularly:  Yes    Barriers to taking medications:  None    Medication side effects:  None    Ability to successfully perform activities of daily living:  Housework requires assistance    Home Safety:  No safety concerns identified    Hearing Impairment:  No hearing concerns    In the past 6 months, have you been bothered by leaking of urine?  No    In general, how would you rate your overall mental or emotional health?  Good      PHQ-2 Total Score:    Additional concerns today:  No    Do you feel safe in your environment? Yes    Have you ever done Advance Care Planning? (For example, a Health Directive, POLST, or a discussion with a medical provider or your loved ones about your wishes): No, advance care planning information given to patient to review.  Patient plans to discuss their wishes with loved ones or provider.         Fall risk  Fallen 2 or more times in the past year?: No  Any fall with injury in the past year?: No    Cognitive Screening Not appropriate due to mental handicap    Do you have sleep apnea, excessive snoring or daytime drowsiness?: no    Reviewed and updated as needed this visit by clinical staff                Reviewed and updated as needed this visit by Provider               Social History     Tobacco Use     Smoking status: Former Smoker     Quit date: 1975     Years since quittin.4     Smokeless " "tobacco: Never Used     Tobacco comment: quit 1982    Substance Use Topics     Alcohol use: Yes     Comment: Alcoholic Drinks/day: glass per week     If you drink alcohol do you typically have >3 drinks per day or >7 drinks per week? No    No flowsheet data found.            Current providers sharing in care for this patient include:   Patient Care Team:  Gina Jackson MD as PCP - General (Internal Medicine)  Gina Jackson MD as Assigned PCP    The following health maintenance items are reviewed in Epic and correct as of today:  Health Maintenance Due   Topic Date Due     ANNUAL REVIEW OF HM ORDERS  Never done     ADVANCE CARE PLANNING  Never done     ZOSTER IMMUNIZATION (1 of 2) Never done     DTAP/TDAP/TD IMMUNIZATION (1 - Tdap) 07/10/2008     MEDICARE ANNUAL WELLNESS VISIT  08/07/2020     FALL RISK ASSESSMENT  11/26/2020     INFLUENZA VACCINE (1) 09/01/2021     COVID-19 Vaccine (3 - Booster for Moderna series) 11/21/2021     PHQ-2  01/01/2022       Pertinent mammograms are reviewed under the imaging tab.    Review of Systems  Constitutional, HEENT, cardiovascular, pulmonary, GI, , musculoskeletal, neuro, skin, endocrine and psych systems are negative, except as otherwise noted.    OBJECTIVE:   /71 (BP Location: Right arm, Patient Position: Sitting, Cuff Size: Adult Regular)   Pulse 60   Temp 97.6  F (36.4  C) (Temporal)   Resp 16   Ht 1.676 m (5' 6\")   Wt 55.8 kg (123 lb)   SpO2 100%   BMI 19.85 kg/m   Estimated body mass index is 21.95 kg/m  as calculated from the following:    Height as of 11/26/19: 1.676 m (5' 6\").    Weight as of 11/26/19: 61.7 kg (136 lb).  Physical Exam  GENERAL: alert and no distress  EYES: Eyes grossly normal to inspection, PERRL and conjunctivae and sclerae normal  HENT: ear canals and TM's normal, nose and mouth without ulcers or lesions  NECK: no adenopathy, no asymmetry, masses, or scars and thyroid normal to palpation  RESP: lungs clear to auscultation - no " "rales, rhonchi or wheezes  CV: regular rate and rhythm  ABDOMEN: soft, nontender, no hepatosplenomegaly, no masses and bowel sounds normal  MS: no gross musculoskeletal defects noted, no edema  SKIN: right lower leg skin rashes present  NEURO: Normal strength and tone  PSYCH: flat affect, dementia symptoms present    Diagnostic Test Results:  Labs reviewed in Epic    ASSESSMENT / PLAN:   Khloe was seen today for physical.    Diagnoses and all orders for this visit:    Encounter for Medicare annual wellness exam    Alzheimer's disease (H)  -     donepezil (ARICEPT) 5 MG tablet; Take 1 tablet (5 mg) by mouth At Bedtime    Hyperlipidemia LDL goal <100  -     atorvastatin (LIPITOR) 40 MG tablet; Take 0.5 tablets (20 mg) by mouth daily    Primary hypertension  -     lisinopril (ZESTRIL) 10 MG tablet; Take 1 tablet (10 mg) by mouth daily    Rash  -     hydrocortisone (CORTAID) 1 % external cream; Apply topically 2 times daily  -     Adult Dermatology Referral; Future        Patient has been advised of split billing requirements and indicates understanding: Yes  COUNSELING:  Reviewed preventive health counseling, as reflected in patient instructions  Special attention given to:       Regular exercise       Healthy diet/nutrition    Estimated body mass index is 21.95 kg/m  as calculated from the following:    Height as of 11/26/19: 1.676 m (5' 6\").    Weight as of 11/26/19: 61.7 kg (136 lb).        She reports that she quit smoking about 46 years ago. She has never used smokeless tobacco.      Appropriate preventive services were discussed with this patient, including applicable screening as appropriate for cardiovascular disease, diabetes, osteopenia/osteoporosis, and glaucoma.  As appropriate for age/gender, discussed screening for colorectal cancer, prostate cancer, breast cancer, and cervical cancer. Checklist reviewing preventive services available has been given to the patient.    Reviewed patients plan of care and " provided an AVS. The Basic Care Plan (routine screening as documented in Health Maintenance) for Khloe meets the Care Plan requirement. This Care Plan has been established and reviewed with the Patient and son.    Counseling Resources:  ATP IV Guidelines  Pooled Cohorts Equation Calculator  Breast Cancer Risk Calculator  Breast Cancer: Medication to Reduce Risk  FRAX Risk Assessment  ICSI Preventive Guidelines  Dietary Guidelines for Americans, 2010  USDA's MyPlate  ASA Prophylaxis  Lung CA Screening    Viri Akbar MD  [facility]    Identified Health Risks:

## 2022-04-15 ENCOUNTER — OFFICE VISIT (OUTPATIENT)
Dept: INTERNAL MEDICINE | Facility: CLINIC | Age: 87
End: 2022-04-15
Payer: MEDICARE

## 2022-04-15 ENCOUNTER — IMMUNIZATION (OUTPATIENT)
Dept: NURSING | Facility: CLINIC | Age: 87
End: 2022-04-15

## 2022-04-15 VITALS
DIASTOLIC BLOOD PRESSURE: 72 MMHG | SYSTOLIC BLOOD PRESSURE: 122 MMHG | OXYGEN SATURATION: 100 % | HEIGHT: 66 IN | HEART RATE: 65 BPM | WEIGHT: 126 LBS | TEMPERATURE: 97.6 F | BODY MASS INDEX: 20.25 KG/M2

## 2022-04-15 DIAGNOSIS — R41.3 MEMORY LOSS: ICD-10-CM

## 2022-04-15 DIAGNOSIS — Z23 HIGH PRIORITY FOR COVID-19 VACCINATION: ICD-10-CM

## 2022-04-15 DIAGNOSIS — R39.15 URINARY URGENCY: ICD-10-CM

## 2022-04-15 DIAGNOSIS — R21 RASH: ICD-10-CM

## 2022-04-15 DIAGNOSIS — R47.9 DIFFICULTY WITH SPEECH: ICD-10-CM

## 2022-04-15 DIAGNOSIS — R13.10 DYSPHAGIA, UNSPECIFIED TYPE: Primary | ICD-10-CM

## 2022-04-15 DIAGNOSIS — I10 ESSENTIAL HYPERTENSION: ICD-10-CM

## 2022-04-15 DIAGNOSIS — E78.5 HYPERLIPIDEMIA, UNSPECIFIED HYPERLIPIDEMIA TYPE: ICD-10-CM

## 2022-04-15 DIAGNOSIS — M81.0 AGE-RELATED OSTEOPOROSIS WITHOUT CURRENT PATHOLOGICAL FRACTURE: ICD-10-CM

## 2022-04-15 PROBLEM — H40.9 UNSPECIFIED GLAUCOMA: Status: ACTIVE | Noted: 2022-04-15

## 2022-04-15 PROBLEM — M89.9 DISORDER OF BONE AND CARTILAGE: Status: ACTIVE | Noted: 2022-04-15

## 2022-04-15 PROBLEM — M94.9 DISORDER OF BONE AND CARTILAGE: Status: ACTIVE | Noted: 2022-04-15

## 2022-04-15 LAB
ALBUMIN SERPL-MCNC: 3.9 G/DL (ref 3.4–5)
ALBUMIN UR-MCNC: NEGATIVE MG/DL
ALP SERPL-CCNC: 91 U/L (ref 40–150)
ALT SERPL W P-5'-P-CCNC: 28 U/L (ref 0–50)
ANION GAP SERPL CALCULATED.3IONS-SCNC: 6 MMOL/L (ref 3–14)
APPEARANCE UR: CLEAR
AST SERPL W P-5'-P-CCNC: 18 U/L (ref 0–45)
BACTERIA #/AREA URNS HPF: ABNORMAL /HPF
BILIRUB SERPL-MCNC: 0.3 MG/DL (ref 0.2–1.3)
BILIRUB UR QL STRIP: NEGATIVE
BUN SERPL-MCNC: 21 MG/DL (ref 7–30)
CALCIUM SERPL-MCNC: 9.8 MG/DL (ref 8.5–10.1)
CHLORIDE BLD-SCNC: 109 MMOL/L (ref 94–109)
CO2 SERPL-SCNC: 26 MMOL/L (ref 20–32)
COLOR UR AUTO: YELLOW
CREAT SERPL-MCNC: 0.63 MG/DL (ref 0.52–1.04)
ERYTHROCYTE [DISTWIDTH] IN BLOOD BY AUTOMATED COUNT: 13.3 % (ref 10–15)
GFR SERPL CREATININE-BSD FRML MDRD: 86 ML/MIN/1.73M2
GLUCOSE BLD-MCNC: 93 MG/DL (ref 70–99)
GLUCOSE UR STRIP-MCNC: NEGATIVE MG/DL
HCT VFR BLD AUTO: 44.8 % (ref 35–47)
HGB BLD-MCNC: 14.3 G/DL (ref 11.7–15.7)
HGB UR QL STRIP: NEGATIVE
KETONES UR STRIP-MCNC: NEGATIVE MG/DL
LEUKOCYTE ESTERASE UR QL STRIP: NEGATIVE
MCH RBC QN AUTO: 29.4 PG (ref 26.5–33)
MCHC RBC AUTO-ENTMCNC: 31.9 G/DL (ref 31.5–36.5)
MCV RBC AUTO: 92 FL (ref 78–100)
MUCOUS THREADS #/AREA URNS LPF: PRESENT /LPF
NITRATE UR QL: NEGATIVE
PH UR STRIP: 6 [PH] (ref 5–7)
PLATELET # BLD AUTO: 231 10E3/UL (ref 150–450)
POTASSIUM BLD-SCNC: 4.3 MMOL/L (ref 3.4–5.3)
PROT SERPL-MCNC: 7.1 G/DL (ref 6.8–8.8)
RBC # BLD AUTO: 4.87 10E6/UL (ref 3.8–5.2)
RBC #/AREA URNS AUTO: ABNORMAL /HPF
SODIUM SERPL-SCNC: 141 MMOL/L (ref 133–144)
SP GR UR STRIP: >=1.03 (ref 1–1.03)
SQUAMOUS #/AREA URNS AUTO: ABNORMAL /LPF
TSH SERPL DL<=0.005 MIU/L-ACNC: 3.91 MU/L (ref 0.4–4)
UROBILINOGEN UR STRIP-ACNC: 0.2 E.U./DL
WBC # BLD AUTO: 6.2 10E3/UL (ref 4–11)
WBC #/AREA URNS AUTO: ABNORMAL /HPF

## 2022-04-15 PROCEDURE — 99214 OFFICE O/P EST MOD 30 MIN: CPT | Performed by: NURSE PRACTITIONER

## 2022-04-15 PROCEDURE — 84443 ASSAY THYROID STIM HORMONE: CPT | Performed by: NURSE PRACTITIONER

## 2022-04-15 PROCEDURE — 0064A COVID-19,PF,MODERNA (18+ YRS BOOSTER .25ML): CPT

## 2022-04-15 PROCEDURE — 36415 COLL VENOUS BLD VENIPUNCTURE: CPT | Performed by: NURSE PRACTITIONER

## 2022-04-15 PROCEDURE — 85027 COMPLETE CBC AUTOMATED: CPT | Performed by: NURSE PRACTITIONER

## 2022-04-15 PROCEDURE — 80053 COMPREHEN METABOLIC PANEL: CPT | Performed by: NURSE PRACTITIONER

## 2022-04-15 PROCEDURE — 81001 URINALYSIS AUTO W/SCOPE: CPT | Performed by: NURSE PRACTITIONER

## 2022-04-15 PROCEDURE — 91306 COVID-19,PF,MODERNA (18+ YRS BOOSTER .25ML): CPT

## 2022-04-15 RX ORDER — TRIAMCINOLONE ACETONIDE 1 MG/G
CREAM TOPICAL 2 TIMES DAILY
Qty: 15 G | Refills: 0 | Status: SHIPPED | OUTPATIENT
Start: 2022-04-15 | End: 2022-07-11

## 2022-04-15 ASSESSMENT — ENCOUNTER SYMPTOMS
JOINT SWELLING: 0
CONSTIPATION: 0
COUGH: 1
DIARRHEA: 0
FREQUENCY: 0
HEMATOCHEZIA: 0
ABDOMINAL PAIN: 0
FEVER: 0
NERVOUS/ANXIOUS: 0
MYALGIAS: 0
DIZZINESS: 0
WEAKNESS: 0
HEADACHES: 0
PALPITATIONS: 0
CHILLS: 0
ARTHRALGIAS: 1
PARESTHESIAS: 0
EYE PAIN: 0
SHORTNESS OF BREATH: 0
SORE THROAT: 0
HEMATURIA: 0
BREAST MASS: 0
NAUSEA: 0
DYSURIA: 0
HEARTBURN: 0

## 2022-04-15 ASSESSMENT — ACTIVITIES OF DAILY LIVING (ADL)
CURRENT_FUNCTION: PREPARING MEALS REQUIRES ASSISTANCE
CURRENT_FUNCTION: MEDICATION ADMINISTRATION REQUIRES ASSISTANCE
CURRENT_FUNCTION: TRANSPORTATION REQUIRES ASSISTANCE
CURRENT_FUNCTION: TELEPHONE REQUIRES ASSISTANCE
CURRENT_FUNCTION: SHOPPING REQUIRES ASSISTANCE

## 2022-04-15 NOTE — PROGRESS NOTES
"SUBJECTIVE:   Khloe Sparks is a 86 year old female who presents for Preventive Visit.    {Split Bill scripting  The purpose of this visit is to discuss your medical history and prevent health problems before you are sick. You may be responsible for a co-pay, coinsurance, or deductible if your visit today includes services such as checking on a sore throat, having an x-ray or lab test, or treating and evaluating a new or existing condition :134308}  Patient has been advised of split billing requirements and indicates understanding: Yes  Are you in the first 12 months of your Medicare coverage?  No    HPI  Do you feel safe in your environment? Yes    Have you ever done Advance Care Planning? (For example, a Health Directive, POLST, or a discussion with a medical provider or your loved ones about your wishes): No, advance care planning information given to patient to review.  Patient plans to discuss their wishes with loved ones or provider.         Fall risk  { :099974}  {If any of the above assessments are answered yes, consider ordering appropriate referrals (Optional):053445::\"click delete button to remove this line now\"}  Cognitive Screening   1) Repeat 3 items (Leader, Season, Table)    2) Clock draw: { :03029::\"NORMAL\"}  3) 3 item recall: { :998062}  Results: {MINICOG RESULTS:430130}    Mini-CogTM Copyright ARNALDO Gilmore. Licensed by the author for use in [facility]; reprinted with permission (dwight@.[email or web address]). All rights reserved.      {Do you have sleep apnea, excessive snoring or daytime drowsiness? (Optional):357819}    Reviewed and updated as needed this visit by clinical staff                    Reviewed and updated as needed this visit by Provider                   Social History     Tobacco Use     Smoking status: Former Smoker     Quit date: 1975     Years since quittin.7     Smokeless tobacco: Never Used     Tobacco comment: quit     Substance Use Topics     Alcohol use: Yes     " "Comment: Alcoholic Drinks/day: glass per week     If you drink alcohol do you typically have >3 drinks per day or >7 drinks per week? No    Alcohol Use 2022   Prescreen: >3 drinks/day or >7 drinks/week? No   No flowsheet data found.    {Outside tests to abstract? :972829}    {additional problems to add (Optional):678039}    Current providers sharing in care for this patient include: {Rooming staff:  Please update Care Team in Rooming Activity, refresh this note and then delete this statement}  Patient Care Team:  Gina Jackson MD as PCP - General (Internal Medicine)  Viri Akbar MD as Referring Physician (Internal Medicine)  Kassandra Yu PA-C as Physician Assistant (Dermatology)  Viri Akbar MD as Assigned PCP    The following health maintenance items are reviewed in Epic and correct as of today:  Health Maintenance Due   Topic Date Due     ANNUAL REVIEW OF HM ORDERS  Never done     ZOSTER IMMUNIZATION (1 of 2) Never done     DTAP/TDAP/TD IMMUNIZATION (1 - Tdap) 07/10/2008     COVID-19 Vaccine (3 - Booster for Moderna series) 10/21/2021     {Chronicprobdata (optional):267176}  {Decision Support (Optional):540387}    {Mammo DS 75+ :332975}  Pertinent mammograms are reviewed under the imaging tab.    Review of Systems  {ROS COMP (Optional):079568}    OBJECTIVE:   There were no vitals taken for this visit. Estimated body mass index is 19.85 kg/m  as calculated from the following:    Height as of 22: 1.676 m (5' 6\").    Weight as of 22: 55.8 kg (123 lb).  Physical Exam  {Exam (Optional) :837726}    {Diagnostic Test Results (Optional):027730::\"Diagnostic Test Results:\",\"Labs reviewed in Epic\"}    ASSESSMENT / PLAN:   {Diag Picklist:915049}    {Patient advised of split billing (Optional):504404}    COUNSELING:  {Medicare Counselin}    Estimated body mass index is 19.85 kg/m  as calculated from the following:    Height as of 22: 1.676 m (5' 6\").    Weight as of 22: " 55.8 kg (123 lb).    {Weight Management Plan (ACO) Complete if BMI is abnormal-  Ages 18-64  BMI >24.9.  Age 65+ with BMI <23 or >30 (Optional):522888}    She reports that she quit smoking about 46 years ago. She has never used smokeless tobacco.      Appropriate preventive services were discussed with this patient, including applicable screening as appropriate for cardiovascular disease, diabetes, osteopenia/osteoporosis, and glaucoma.  As appropriate for age/gender, discussed screening for colorectal cancer, prostate cancer, breast cancer, and cervical cancer. Checklist reviewing preventive services available has been given to the patient.    Reviewed patients plan of care and provided an AVS. The {CarePlan:924401} for Khloe meets the Care Plan requirement. This Care Plan has been established and reviewed with the {PATIENT, FAMILY MEMBER, CAREGIVER:997028}.    Counseling Resources:  ATP IV Guidelines  Pooled Cohorts Equation Calculator  Breast Cancer Risk Calculator  Breast Cancer: Medication to Reduce Risk  FRAX Risk Assessment  ICSI Preventive Guidelines  Dietary Guidelines for Americans, 2010  USDA's MyPlate  ASA Prophylaxis  Lung CA Screening    RAVINDRA Solitario [facility]    Identified Health Risks:

## 2022-04-15 NOTE — PROGRESS NOTES
Assessment & Plan     Dysphagia, unspecified type  - Referral to Speech therapy for swallow eval  - Called son later after visit after further thought - given new issues with swallowing, worsening memory, worsening speech and vocal tone, and after reviewing patient's chart, does not appear that she has ever had any brain imaging done though it does mention in previous notes that an MRI and CT had been discussed.  Patient's son states that there had been thought about doing imaging to rule out a stroke but overall would not change plan of care.  The patient's son will discuss with his brother if they want to pursue additional brain imaging to rule out a stroke, given new dysphagia, and speech changes.  - Speech Therapy Referral    Essential hypertension  - Stable; continue lisinopril  - Labs ordered  - REVIEW OF HEALTH MAINTENANCE PROTOCOL ORDERS  - Comprehensive metabolic panel (BMP + Alb, Alk Phos, ALT, AST, Total. Bili, TP)  - TSH with free T4 reflex  - CBC with platelets  - Comprehensive metabolic panel (BMP + Alb, Alk Phos, ALT, AST, Total. Bili, TP)  - TSH with free T4 reflex  - CBC with platelets    Hyperlipidemia, unspecified hyperlipidemia type  - Continue Statin; consider lipid check when fasting  - REVIEW OF HEALTH MAINTENANCE PROTOCOL ORDERS    Memory loss  - Has had issues with her memory starting in 2018, early dementia suspected at that time.  Started on Aricept.  Son reports worsening memory issues, more trouble expressing herself with her speech, in addition to issues with being able to regulate the tone of her voice.  States she speaks very loudly in standard environments.  Endorses having been seen in ENT and having a hearing test which endorsed is normal.  - Spoke with son after visit, and brain imaging had not been done in the past and patient did not follow-up with neurology.  Overall consensus was given strong family history that this was dementia and that further work-up would not change the  plan of care.  Discussed again with son regarding concerns regarding new dysphagia and worsening speech, had considered stroke work-up however again, this would not change the outcome.  Rather work on secondary prevention.  Her blood pressure is well controlled and she is on a statin.  Discussed with son to start a baby aspirin every day.  He will discuss this with his brother to determine if they would like to have a baseline brain imaging done.  - Continue Aricept  - Patient is in a safe environment as she is living with her son and they are tending to her needs.  - REVIEW OF HEALTH MAINTENANCE PROTOCOL ORDERS    Urinary urgency  - UA ordered.  Suspect overactive bladder  - UA with Microscopic reflex to Culture - lab collect  - UA with Microscopic reflex to Culture - lab collect  - Urine Microscopic    Rash  - Trial of triamcinolone cream  - Referral to Dermatology; ?psoriasis  - triamcinolone (KENALOG) 0.1 % external cream  Dispense: 15 g; Refill: 0  - Adult Dermatology Referral    Age-related osteoporosis without current pathological fracture  - Hx of; per prior PCP notes, patient declined medication therapy.  Son expresses concern re: fragility fracture.  DEXA scan in 2018 showed osteoporosis with high risk for hip fracture  - Will check DEXA  - REVIEW OF HEALTH MAINTENANCE PROTOCOL ORDERS  - DX Hip/Pelvis/Spine    High priority for COVID-19 vaccination  - Booster today; get 4th booster in 4 months    Difficulty with speech  - See above conversation- will refer to SLP  - Speech Therapy Referral      See Patient Instructions    RAVINDRA Solitario CNP  [facility]    Sal Ledbetter is a 86 year old who presents for the following health issues  accompanied by her son.    Would like meet and greet-  Possibly establish care    Healthy Habits:     In general, how would you rate your overall health?  Good    Frequency of exercise:  2-3 days/week    Duration of exercise:   "Greater than 60 minutes    Do you usually eat at least 4 servings of fruit and vegetables a day, include whole grains    & fiber and avoid regularly eating high fat or \"junk\" foods?  No    Taking medications regularly:  Yes    Barriers to taking medications:  Problems remembering to take them    Medication side effects:  Other    Ability to successfully perform activities of daily living:  Telephone requires assistance, transportation requires assistance, shopping requires assistance, preparing meals requires assistance and medication administration requires assistance    Home Safety:  No safety concerns identified    Hearing Impairment:  No hearing concerns    In the past 6 months, have you been bothered by leaking of urine? Yes    In general, how would you rate your overall mental or emotional health?  Fair      PHQ-2 Total Score: 1    Additional concerns today:  Yes      Physical:  Patient is seen in clinic today for a general physical, possibly establish care, and to discuss some concerns.  Of note patient had her annual wellness exam in January with Dr. Akbar.  Son reports that a physical exam was not done and they would like to be sure everything is ok.    Was previously seen at an outside clinic but primary care provider no longer there.    Memory Loss:  Speech Difficulty:  Diagnosed with memory loss in 2018 and started on Aricept.  Son reports that the past 6 months have been more difficult and feels that memory is worsening.  States that his mother has great difficulty expressing herself with her speech however is able to understand what people are saying to her.  He also endorses some difficulty with her his mother being able to control the tone of her voice with the loudness of her voice.  States they did have a visit with ENT; had hearing exam and it sounds like everything was okay.  Patient is currently maintained on Aricept 5 mg daily, and appears to be tolerating this without any difficulty.  Patient " lives with her son.  She really is not left alone the only time that she may be alone is outside in the summertime when she is gardening.  Son reports that they no longer allow the patient to use the stove in the kitchen.  Notes that she does have some difficulty putting meals together, for example, will put strawberries on her ham sandwich.  She is still able to make some very simple meals for herself that do not require a lot of detail.  Son reports that she does not wander outside or around the home.  Does not endorse feeling like she is lost.  She is walking okay and has not had any falls.  Eating and drinking fine but there is a concern regarding swallowing.    Son is looking into adult  centers so that his mom can get out and socialize.    Dysphagia:  Expresses concern regarding swallowing.  Notes that after she drinks she will cough frequently.  Also notes some coughing after she eats.    HTN:  Maintained on lisinopril 10 mg daily.  She denies chest pain, headaches, dizziness, shortness of breath when asked.  Blood pressure is at goal.    Rash:  Having a rash to her right lateral lower leg.  Patient states it is not itchy however her son finds her scratching her leg frequently.  No other rash elsewhere.    Urinary urgency:  Having some urinary urgency, some accidents.  Denies any pain or burning with urination.      Review of Systems   Constitutional: Negative for chills and fever.   HENT: Positive for congestion. Negative for ear pain, hearing loss and sore throat.    Eyes: Negative for pain and visual disturbance.   Respiratory: Positive for cough. Negative for shortness of breath.    Cardiovascular: Negative for chest pain, palpitations and peripheral edema.   Gastrointestinal: Negative for abdominal pain, constipation, diarrhea, heartburn, hematochezia and nausea.   Breasts:  Negative for tenderness, breast mass and discharge.   Genitourinary: Positive for urgency. Negative for dysuria, frequency,  "hematuria, pelvic pain, vaginal bleeding and vaginal discharge.   Musculoskeletal: Positive for arthralgias. Negative for joint swelling and myalgias.   Skin: Positive for rash.   Neurological: Negative for dizziness, weakness, headaches and paresthesias.   Psychiatric/Behavioral: Negative for mood changes. The patient is not nervous/anxious.       CONSTITUTIONAL:NEGATIVE for fever, chills, change in weight  INTEGUMENTARY/SKIN: POSITIVE for rash right lower extremity  EYES: NEGATIVE for vision changes or irritation  ENT/MOUTH: NEGATIVE for ear, mouth and throat problems  RESP:NEGATIVE for significant cough or SOB and denies congestion when asked denies cough when asked  CV: NEGATIVE for chest pain, palpitations or peripheral edema  GI: NEGATIVE for nausea, abdominal pain, heartburn, or change in bowel habits  :  POSITIVE for urinary urgency  MUSCULOSKELETAL: NEGATIVE for significant arthralgias or myalgia  NEURO: NEGATIVE for weakness, dizziness or paresthesias and POSITIVE for worsening memory but no behavioral changes  PSYCHIATRIC: NEGATIVE for changes in mood or affect      Objective    /72   Pulse 65   Temp 97.6  F (36.4  C) (Tympanic)   Ht 1.676 m (5' 6\")   Wt 57.2 kg (126 lb)   LMP  (LMP Unknown)   SpO2 100%   Breastfeeding No   BMI 20.34 kg/m    Body mass index is 20.34 kg/m .     Physical Exam   GENERAL APPEARANCE: healthy, alert and no distress  EYES: Eyes grossly normal to inspection, PERRL and conjunctivae and sclerae normal  HENT: ear canals and TM's normal and nose and mouth without ulcers or lesions  NECK: no adenopathy, no asymmetry, masses, or scars and thyroid normal to palpation  RESP: lungs clear to auscultation - no rales, rhonchi or wheezes  CV: regular rates and rhythm, normal S1 S2, no S3 or S4 and no murmur, click or rub  ABDOMEN: soft, nontender, without hepatosplenomegaly or masses and bowel sounds normal  MS: extremities normal- no gross deformities noted  SKIN: Right lower " "extremity, lateral aspect, there is an area of erythema, excoriation, dry scaly skin.  No warmth, no drainage  NEURO: Alert, attentive.  Is able to tell me that her birthday is in [birth year] but was unable to easily tell me the date or the year.  Considerable expressive aphasia noted, as well as a very loud tone to her voice, often speaking \"Ahhh\".  Follows commands without difficulty. DORITA.  Moves all extremities equal and strong.  Gait is stable.  PSYCH: mentation appears normal and affect normal/bright    -Care Everywhere reviewed             "

## 2022-04-15 NOTE — PATIENT INSTRUCTIONS
-Schedule a Bone density scan  -Referral to Dermatology  -Try the triamcinolone cream to right leg rash, twice daily as needed  -Referral to Speech Therapy for swallow evaluation and speech/voice  -Stop by lab to get a urine test and blood work   Need Continuous Pulse Ox Monitoring

## 2022-06-04 ENCOUNTER — APPOINTMENT (OUTPATIENT)
Dept: CT IMAGING | Facility: CLINIC | Age: 87
DRG: 522 | End: 2022-06-04
Attending: EMERGENCY MEDICINE
Payer: MEDICARE

## 2022-06-04 ENCOUNTER — APPOINTMENT (OUTPATIENT)
Dept: GENERAL RADIOLOGY | Facility: CLINIC | Age: 87
DRG: 522 | End: 2022-06-04
Attending: EMERGENCY MEDICINE
Payer: MEDICARE

## 2022-06-04 ENCOUNTER — HOSPITAL ENCOUNTER (INPATIENT)
Facility: CLINIC | Age: 87
LOS: 4 days | Discharge: NURSING, REHAB OR LONG-TERM CARE | DRG: 522 | End: 2022-06-08
Attending: EMERGENCY MEDICINE | Admitting: INTERNAL MEDICINE
Payer: MEDICARE

## 2022-06-04 DIAGNOSIS — S72.002A LEFT DISPLACED FEMORAL NECK FRACTURE (H): ICD-10-CM
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
## 2022-06-04 LAB
ANION GAP SERPL CALCULATED.3IONS-SCNC: 6 MMOL/L (ref 3–14)
ATRIAL RATE - MUSE: 65 BPM
BASOPHILS # BLD AUTO: 0 10E3/UL (ref 0–0.2)
BASOPHILS NFR BLD AUTO: 0 %
BUN SERPL-MCNC: 16 MG/DL (ref 7–30)
CALCIUM SERPL-MCNC: 8.8 MG/DL (ref 8.5–10.1)
CHLORIDE BLD-SCNC: 105 MMOL/L (ref 94–109)
CO2 SERPL-SCNC: 27 MMOL/L (ref 20–32)
CREAT SERPL-MCNC: 0.6 MG/DL (ref 0.52–1.04)
DIASTOLIC BLOOD PRESSURE - MUSE: NORMAL MMHG
EOSINOPHIL # BLD AUTO: 0.1 10E3/UL (ref 0–0.7)
EOSINOPHIL NFR BLD AUTO: 1 %
ERYTHROCYTE [DISTWIDTH] IN BLOOD BY AUTOMATED COUNT: 12.2 % (ref 10–15)
GFR SERPL CREATININE-BSD FRML MDRD: 87 ML/MIN/1.73M2
GLUCOSE BLD-MCNC: 125 MG/DL (ref 70–99)
HCT VFR BLD AUTO: 40 % (ref 35–47)
HGB BLD-MCNC: 13.2 G/DL (ref 11.7–15.7)
HOLD SPECIMEN: NORMAL
IMM GRANULOCYTES # BLD: 0 10E3/UL
IMM GRANULOCYTES NFR BLD: 0 %
INTERPRETATION ECG - MUSE: NORMAL
LYMPHOCYTES # BLD AUTO: 1 10E3/UL (ref 0.8–5.3)
LYMPHOCYTES NFR BLD AUTO: 11 %
MCH RBC QN AUTO: 29.5 PG (ref 26.5–33)
MCHC RBC AUTO-ENTMCNC: 33 G/DL (ref 31.5–36.5)
MCV RBC AUTO: 89 FL (ref 78–100)
MONOCYTES # BLD AUTO: 0.6 10E3/UL (ref 0–1.3)
MONOCYTES NFR BLD AUTO: 6 %
NEUTROPHILS # BLD AUTO: 7.4 10E3/UL (ref 1.6–8.3)
NEUTROPHILS NFR BLD AUTO: 82 %
NRBC # BLD AUTO: 0 10E3/UL
NRBC BLD AUTO-RTO: 0 /100
P AXIS - MUSE: 82 DEGREES
PLATELET # BLD AUTO: 205 10E3/UL (ref 150–450)
POTASSIUM BLD-SCNC: 3.8 MMOL/L (ref 3.4–5.3)
PR INTERVAL - MUSE: 152 MS
QRS DURATION - MUSE: 84 MS
QT - MUSE: 426 MS
QTC - MUSE: 443 MS
R AXIS - MUSE: 82 DEGREES
RBC # BLD AUTO: 4.48 10E6/UL (ref 3.8–5.2)
SARS-COV-2 RNA RESP QL NAA+PROBE: NEGATIVE
SODIUM SERPL-SCNC: 138 MMOL/L (ref 133–144)
SYSTOLIC BLOOD PRESSURE - MUSE: NORMAL MMHG
T AXIS - MUSE: 81 DEGREES
VENTRICULAR RATE- MUSE: 65 BPM
WBC # BLD AUTO: 9 10E3/UL (ref 4–11)

## 2022-06-04 PROCEDURE — 96374 THER/PROPH/DIAG INJ IV PUSH: CPT

## 2022-06-04 PROCEDURE — 93005 ELECTROCARDIOGRAM TRACING: CPT

## 2022-06-04 PROCEDURE — 120N000001 HC R&B MED SURG/OB

## 2022-06-04 PROCEDURE — G1004 CDSM NDSC: HCPCS

## 2022-06-04 PROCEDURE — 258N000003 HC RX IP 258 OP 636: Performed by: INTERNAL MEDICINE

## 2022-06-04 PROCEDURE — C9803 HOPD COVID-19 SPEC COLLECT: HCPCS

## 2022-06-04 PROCEDURE — 99223 1ST HOSP IP/OBS HIGH 75: CPT | Mod: AI | Performed by: INTERNAL MEDICINE

## 2022-06-04 PROCEDURE — 99285 EMERGENCY DEPT VISIT HI MDM: CPT | Mod: 25

## 2022-06-04 PROCEDURE — 36415 COLL VENOUS BLD VENIPUNCTURE: CPT | Performed by: EMERGENCY MEDICINE

## 2022-06-04 PROCEDURE — 250N000013 HC RX MED GY IP 250 OP 250 PS 637: Performed by: INTERNAL MEDICINE

## 2022-06-04 PROCEDURE — 250N000011 HC RX IP 250 OP 636: Performed by: EMERGENCY MEDICINE

## 2022-06-04 PROCEDURE — 82306 VITAMIN D 25 HYDROXY: CPT | Performed by: PHYSICIAN ASSISTANT

## 2022-06-04 PROCEDURE — 80048 BASIC METABOLIC PNL TOTAL CA: CPT | Performed by: EMERGENCY MEDICINE

## 2022-06-04 PROCEDURE — 85025 COMPLETE CBC W/AUTO DIFF WBC: CPT | Performed by: EMERGENCY MEDICINE

## 2022-06-04 PROCEDURE — U0003 INFECTIOUS AGENT DETECTION BY NUCLEIC ACID (DNA OR RNA); SEVERE ACUTE RESPIRATORY SYNDROME CORONAVIRUS 2 (SARS-COV-2) (CORONAVIRUS DISEASE [COVID-19]), AMPLIFIED PROBE TECHNIQUE, MAKING USE OF HIGH THROUGHPUT TECHNOLOGIES AS DESCRIBED BY CMS-2020-01-R: HCPCS | Performed by: EMERGENCY MEDICINE

## 2022-06-04 PROCEDURE — 73502 X-RAY EXAM HIP UNI 2-3 VIEWS: CPT

## 2022-06-04 RX ORDER — PROCHLORPERAZINE 25 MG
12.5 SUPPOSITORY, RECTAL RECTAL EVERY 12 HOURS PRN
Status: DISCONTINUED | OUTPATIENT
Start: 2022-06-04 | End: 2022-06-08 | Stop reason: HOSPADM

## 2022-06-04 RX ORDER — ACETAMINOPHEN 650 MG/1
650 SUPPOSITORY RECTAL EVERY 6 HOURS PRN
Status: DISCONTINUED | OUTPATIENT
Start: 2022-06-04 | End: 2022-06-05

## 2022-06-04 RX ORDER — PROCHLORPERAZINE MALEATE 5 MG
5 TABLET ORAL EVERY 6 HOURS PRN
Status: DISCONTINUED | OUTPATIENT
Start: 2022-06-04 | End: 2022-06-08 | Stop reason: HOSPADM

## 2022-06-04 RX ORDER — SODIUM CHLORIDE 9 MG/ML
INJECTION, SOLUTION INTRAVENOUS CONTINUOUS
Status: DISCONTINUED | OUTPATIENT
Start: 2022-06-04 | End: 2022-06-05

## 2022-06-04 RX ORDER — HYDROMORPHONE HYDROCHLORIDE 1 MG/ML
0.5 INJECTION, SOLUTION INTRAMUSCULAR; INTRAVENOUS; SUBCUTANEOUS ONCE
Status: COMPLETED | OUTPATIENT
Start: 2022-06-04 | End: 2022-06-04

## 2022-06-04 RX ORDER — ONDANSETRON 2 MG/ML
4 INJECTION INTRAMUSCULAR; INTRAVENOUS EVERY 6 HOURS PRN
Status: DISCONTINUED | OUTPATIENT
Start: 2022-06-04 | End: 2022-06-05

## 2022-06-04 RX ORDER — ONDANSETRON 4 MG/1
4 TABLET, ORALLY DISINTEGRATING ORAL EVERY 6 HOURS PRN
Status: DISCONTINUED | OUTPATIENT
Start: 2022-06-04 | End: 2022-06-05

## 2022-06-04 RX ORDER — AMOXICILLIN 250 MG
2 CAPSULE ORAL 2 TIMES DAILY PRN
Status: DISCONTINUED | OUTPATIENT
Start: 2022-06-04 | End: 2022-06-08 | Stop reason: HOSPADM

## 2022-06-04 RX ORDER — AMOXICILLIN 250 MG
1 CAPSULE ORAL 2 TIMES DAILY PRN
Status: DISCONTINUED | OUTPATIENT
Start: 2022-06-04 | End: 2022-06-08 | Stop reason: HOSPADM

## 2022-06-04 RX ORDER — LATANOPROST 50 UG/ML
1 SOLUTION/ DROPS OPHTHALMIC AT BEDTIME
Status: DISCONTINUED | OUTPATIENT
Start: 2022-06-04 | End: 2022-06-08 | Stop reason: HOSPADM

## 2022-06-04 RX ORDER — NALOXONE HYDROCHLORIDE 0.4 MG/ML
0.4 INJECTION, SOLUTION INTRAMUSCULAR; INTRAVENOUS; SUBCUTANEOUS
Status: DISCONTINUED | OUTPATIENT
Start: 2022-06-04 | End: 2022-06-08 | Stop reason: HOSPADM

## 2022-06-04 RX ORDER — DORZOLAMIDE HYDROCHLORIDE AND TIMOLOL MALEATE 20; 5 MG/ML; MG/ML
1 SOLUTION/ DROPS OPHTHALMIC 2 TIMES DAILY
Status: DISCONTINUED | OUTPATIENT
Start: 2022-06-04 | End: 2022-06-08 | Stop reason: HOSPADM

## 2022-06-04 RX ORDER — ACETAMINOPHEN 325 MG/10.15ML
650 LIQUID ORAL
Status: DISCONTINUED | OUTPATIENT
Start: 2022-06-04 | End: 2022-06-05

## 2022-06-04 RX ORDER — ACETAMINOPHEN 325 MG/1
650 TABLET ORAL EVERY 6 HOURS PRN
Status: DISCONTINUED | OUTPATIENT
Start: 2022-06-04 | End: 2022-06-05

## 2022-06-04 RX ORDER — HYDROMORPHONE HCL IN WATER/PF 6 MG/30 ML
0.2 PATIENT CONTROLLED ANALGESIA SYRINGE INTRAVENOUS
Status: DISCONTINUED | OUTPATIENT
Start: 2022-06-04 | End: 2022-06-05

## 2022-06-04 RX ORDER — NALOXONE HYDROCHLORIDE 0.4 MG/ML
0.2 INJECTION, SOLUTION INTRAMUSCULAR; INTRAVENOUS; SUBCUTANEOUS
Status: DISCONTINUED | OUTPATIENT
Start: 2022-06-04 | End: 2022-06-08 | Stop reason: HOSPADM

## 2022-06-04 RX ORDER — LIDOCAINE 40 MG/G
CREAM TOPICAL
Status: DISCONTINUED | OUTPATIENT
Start: 2022-06-04 | End: 2022-06-05

## 2022-06-04 RX ORDER — BISACODYL 10 MG
10 SUPPOSITORY, RECTAL RECTAL DAILY PRN
Status: DISCONTINUED | OUTPATIENT
Start: 2022-06-04 | End: 2022-06-08 | Stop reason: HOSPADM

## 2022-06-04 RX ORDER — LISINOPRIL 10 MG/1
10 TABLET ORAL DAILY
Status: DISCONTINUED | OUTPATIENT
Start: 2022-06-05 | End: 2022-06-08 | Stop reason: HOSPADM

## 2022-06-04 RX ORDER — DONEPEZIL HYDROCHLORIDE 5 MG/1
5 TABLET, FILM COATED ORAL AT BEDTIME
Status: DISCONTINUED | OUTPATIENT
Start: 2022-06-04 | End: 2022-06-08 | Stop reason: HOSPADM

## 2022-06-04 RX ADMIN — DORZOLAMIDE HYDROCHLORIDE AND TIMOLOL MALEATE 1 DROP: 20; 5 SOLUTION/ DROPS OPHTHALMIC at 22:05

## 2022-06-04 RX ADMIN — LATANOPROST 1 DROP: 50 SOLUTION/ DROPS OPHTHALMIC at 23:00

## 2022-06-04 RX ADMIN — SODIUM CHLORIDE, PRESERVATIVE FREE: 5 INJECTION INTRAVENOUS at 19:51

## 2022-06-04 RX ADMIN — DONEPEZIL HYDROCHLORIDE 5 MG: 5 TABLET, FILM COATED ORAL at 22:06

## 2022-06-04 RX ADMIN — HYDROMORPHONE HYDROCHLORIDE 0.5 MG: 1 INJECTION, SOLUTION INTRAMUSCULAR; INTRAVENOUS; SUBCUTANEOUS at 18:43

## 2022-06-04 ASSESSMENT — ACTIVITIES OF DAILY LIVING (ADL)
WEAR_GLASSES_OR_BLIND: YES
EATING/SWALLOWING: SWALLOWING SOLID FOOD
NUMBER_OF_TIMES_PATIENT_HAS_FALLEN_WITHIN_LAST_SIX_MONTHS: 1
VISION_MANAGEMENT: GLASSES
TOILETING_ISSUES: OTHER (SEE COMMENTS)
DRESSING/BATHING_DIFFICULTY: OTHER (SEE COMMENTS)
DOING_ERRANDS_INDEPENDENTLY_DIFFICULTY: YES
SWALLOWING: 2-->DIFFICULTY SWALLOWING FOODS
CHANGE_IN_FUNCTIONAL_STATUS_SINCE_ONSET_OF_CURRENT_ILLNESS/INJURY: YES
EQUIPMENT_CURRENTLY_USED_AT_HOME: OTHER (SEE COMMENTS)
TRANSFERRING: 2-->COMPLETELY DEPENDENT (NOT DEVELOPMENTALLY APPROPRIATE)
ADLS_ACUITY_SCORE: 35
EATING: 0-->INDEPENDENT
WALKING_OR_CLIMBING_STAIRS: OTHER (SEE COMMENTS)
DIFFICULTY_EATING/SWALLOWING: YES
ADLS_ACUITY_SCORE: 34
SWALLOWING: 2-->DIFFICULTY SWALLOWING FOODS
WALKING_OR_CLIMBING_STAIRS_DIFFICULTY: NO
CONCENTRATING,_REMEMBERING_OR_MAKING_DECISIONS_DIFFICULTY: YES
ADLS_ACUITY_SCORE: 35
TRANSFERRING: 2-->COMPLETELY DEPENDENT
EATING: 0-->INDEPENDENT
FALL_HISTORY_WITHIN_LAST_SIX_MONTHS: YES

## 2022-06-04 ASSESSMENT — ENCOUNTER SYMPTOMS
WOUND: 1
SHORTNESS OF BREATH: 0
ABDOMINAL PAIN: 0
FEVER: 0
ARTHRALGIAS: 1

## 2022-06-04 NOTE — PHARMACY-ADMISSION MEDICATION HISTORY
Pharmacy Medication History  Admission medication history interview status for the 6/4/2022  admission is complete. See EPIC admission navigator for prior to admission medications     Location of Interview: Patient room  Medication history sources: Patient's family/friend (son Alen)    Significant changes made to the medication list:  Hydrocortisone is PRN but just finished a course of triamcinolone the other day (to eczema like spots on legs)    In the past week, patient estimated taking medication this percent of the time: greater than 90%    Additional medication history information:   Son brought up that MD may have recommended asa 81mg to add but he wasn't sure so he was going to look into this.     Medication reconciliation completed by provider prior to medication history? No    Time spent in this activity: 15 mins    Prior to Admission medications    Medication Sig Last Dose Taking? Auth Provider   atorvastatin (LIPITOR) 40 MG tablet Take 0.5 tablets (20 mg) by mouth daily 6/3/2022 at Unknown time Yes Viri Akbar MD   donepezil (ARICEPT) 5 MG tablet Take 1 tablet (5 mg) by mouth At Bedtime 6/3/2022 at Unknown time Yes Viri Akbar MD   dorzolamide-timolol (COSOPT) 2-0.5 % ophthalmic solution Place 1 drop Into the left eye 2 times daily 6/3/2022 at Unknown time Yes Reported, Patient   latanoprost (XALATAN) 0.005 % ophthalmic solution instill 1 drop in both eyes at bedtime 6/3/2022 at Unknown time Yes Reported, Patient   lisinopril (ZESTRIL) 10 MG tablet Take 1 tablet (10 mg) by mouth daily 6/3/2022 at Unknown time Yes Viri Akbar MD   multivitamin (THERMEMS) TABS Take 1 tablet by mouth 6/3/2022 at Unknown time Yes Reported, Patient   triamcinolone (KENALOG) 0.1 % external cream Apply topically 2 times daily  Patient taking differently: Apply topically 2 times daily as needed (eczema spots on legs) Past Week at Unknown time Yes Cori Brand APRN CNP   hydrocortisone (CORTAID) 1 %  external cream Apply topically 2 times daily  Patient taking differently: Apply topically 2 times daily as needed prn now  Viri Akbar MD Tiffany M. Reinitz, PharmD     The information provided in this note is only as accurate as the sources available at the time of update(s)

## 2022-06-04 NOTE — ED TRIAGE NOTES
Missed step while walking and fell. C/O left hip pain     Triage Assessment     Row Name 06/04/22 2568       Triage Assessment (Adult)    Airway WDL WDL       Respiratory WDL    Respiratory WDL WDL       Skin Circulation/Temperature WDL    Skin Circulation/Temperature WDL WDL       Cardiac WDL    Cardiac WDL WDL       Peripheral/Neurovascular WDL    Peripheral Neurovascular WDL WDL       Cognitive/Neuro/Behavioral WDL    Cognitive/Neuro/Behavioral WDL X  baseline dementia       [place] Coma Scale    Best Eye Response 4-->(E4) spontaneous    Best Motor Response 6-->(M6) obeys commands    Best Verbal Response 5-->(V5) oriented    [place] Coma Scale Score 15

## 2022-06-04 NOTE — ED NOTES
Bed: ED03  Expected date: 6/4/22  Expected time: 2:55 PM  Means of arrival: Ambulance  Comments:  414 86f fall, hip pain ETA 2729

## 2022-06-04 NOTE — ED NOTES
Patient stating needing to urinate. PureWick placed but patient unable to void and getting increasingly distressed. Bladder scan showed 745+. Dr Diamond notified and patient straight cath'ed. Dr Kim updated as well as he was bedside.

## 2022-06-04 NOTE — ED PROVIDER NOTES
History     Chief Complaint:  Fall     The history is provided by the patient and a relative.      Khloe Sparks is a 86 year old female with a history of hyperlipidemia, hypertension, osteopenia, and a reported history of dementia who presents after a fall with left hip pain. EMS reports that she was walking outside of her [place] home when she missed a step going up some stairs and fell backwards. She sustained an injury to her left hip and an abrasion to her left knee; she has been unable to ambulate since. The patient denies hitting her head or sustaining loss of consciousness during the event. The patient denies history of joint replacement in either lower extremity. The patient reports no other significant health issues, but her son notes that she was recently diagnosed with dementia. Since the fall, her speech and behavior have been per baseline according to her son. She is independently mobile at home where she lives with her son who is her caretaker. She denies the use of aspirin.    Review of Systems   Constitutional: Negative for fever.   Respiratory: Negative for shortness of breath.    Cardiovascular: Negative for chest pain.   Gastrointestinal: Negative for abdominal pain.   Musculoskeletal: Positive for arthralgias (left hip).   Skin: Positive for wound (left knee).   All other systems reviewed and are negative.    Allergies:  The patient has no known allergies.     Medications:  Lipitor  Aricept  Lisinopril    Past Medical History:     Hyperlipidemia   Hypertension  Memory loss  Osteopenia  Glaucoma  Basal cell carcinoma  Lichen planus    Past Surgical History:    Tonsils and adenoids removal  Mid humerus fracture repair  Distal radius and ulna fracture manipulation    Family History:    Sisters: Breast cancer, Alzheimer's disease  Brother: Laryngeal cancer  Mother: Multiple sclerosis  Father: Pancreatic cancer    Social History:  The patient presents to the ED with her son.  The patient  "presents to the ED via EMS.  The patient lives with her son.    Physical Exam     Patient Vitals for the past 24 hrs:   BP Temp Temp src Pulse Resp SpO2 Height Weight   06/04/22 1945 (!) 156/89 98.2  F (36.8  C) Oral 74 16 97 % -- --   06/04/22 1900 -- -- -- -- -- 94 % -- --   06/04/22 1830 135/70 -- -- 73 -- 97 % -- --   06/04/22 1800 -- -- -- -- -- 99 % -- --   06/04/22 1700 -- -- -- -- -- 98 % -- --   06/04/22 1600 -- -- -- -- -- 99 % -- --   06/04/22 1540 (!) 150/74 97.7  F (36.5  C) Oral 61 17 98 % 1.702 m (5' 7\") 59 kg (130 lb)     Physical Exam  General: Patient in mild to moderate distress.  Alert and cooperative with exam. Normal mentation  HEENT: NC/AT. Conjunctiva without injection or scleral icterus. External ears normal.  Respiratory: Breathing comfortably on room air  CV: Normal rate, all extremities well perfused  GI:  Non-distended abdomen  Skin: Warm, dry. Patient has a mild abrasion below her left knee  Musculoskeletal: LLE: CMS intact. She has pain with internal/external rotation of her left hip.  Neuro: Alert, answers questions appropriately. No gross motor deficits    Emergency Department Course     ECG  ECG results from 06/04/22   EKG 12 lead     Value    Systolic Blood Pressure     Diastolic Blood Pressure     Ventricular Rate 65    Atrial Rate 65    WA Interval 152    QRS Duration 84        QTc 443    P Axis 82    R AXIS 82    T Axis 81    Interpretation ECG      Sinus rhythm  Normal ECG  No previous ECGs available  Confirmed by GENERATED REPORT, COMPUTER ([id number]),  Anson Sy ([id number]) on 6/4/2022 4:15:55 PM       Imaging:  CT Hip Left w/o Contrast   Final Result   IMPRESSION:   1.  There is a moderately displaced oblique fracture of the left femoral neck with mild to moderate impaction.   2.  Multiple gallstones in the gallbladder.         XR Pelvis w Hip Left 1 View   Final Result   IMPRESSION: There is likely a fracture of the left greater trochanter and base of the femoral neck, " however this is suboptimally evaluated due to internal rotation on the frontal view and suboptimal image quality on the lateral view. Recommend CT.      Underlying diffuse bony demineralization. Degenerative changes in the lumbar spine.      Report per radiology    Laboratory:  Labs Ordered and Resulted from Time of ED Arrival to Time of ED Departure   BASIC METABOLIC PANEL - Abnormal       Result Value    Sodium 138      Potassium 3.8      Chloride 105      Carbon Dioxide (CO2) 27      Anion Gap 6      Urea Nitrogen 16      Creatinine 0.60      Calcium 8.8      Glucose 125 (*)     GFR Estimate 87     COVID-19 VIRUS (CORONAVIRUS) BY PCR - Normal    SARS CoV2 PCR Negative     CBC WITH PLATELETS AND DIFFERENTIAL    WBC Count 9.0      RBC Count 4.48      Hemoglobin 13.2      Hematocrit 40.0      MCV 89      MCH 29.5      MCHC 33.0      RDW 12.2      Platelet Count 205      % Neutrophils 82      % Lymphocytes 11      % Monocytes 6      % Eosinophils 1      % Basophils 0      % Immature Granulocytes 0      NRBCs per 100 WBC 0      Absolute Neutrophils 7.4      Absolute Lymphocytes 1.0      Absolute Monocytes 0.6      Absolute Eosinophils 0.1      Absolute Basophils 0.0      Absolute Immature Granulocytes 0.0      Absolute NRBCs 0.0        Emergency Department Course:       Reviewed:   I reviewed nursing notes, vitals, past medical history and Care Everywhere    Assessments:  1526 I obtained history and examined the patient as noted above.   1647 I rechecked the patient and explained findings.     Consults:  1745 I spoke with Dr. Kim from the hospitalist service regarding the patient's presentation, findings here in the ED, and plan of care.    Interventions:  1843 Dilaudid 0.5 mg IV    Disposition:  The patient was admitted to the hospital under the care of Dr. Kim.     Impression & Plan     Medical Decision Making:  Khloe Sparks is a 86 year old female who presents for evaluation of left hip pain after a fall  outside her home earlier today. CMS is intact distally in the extremity.  Xrays/CT reveal a fracture of the left hip that will need orthopedic consultation and likely surgery.  The patient's head to toe trauma exam is otherwise normal at this time and no further trauma workup is needed as I believe there is no signs of serious head, neck, chest, spinal, extremity or abdominal injuries.   Will admit to medicine for further cares and ortho consultation.  Pain control achieved.      Diagnosis:    ICD-10-CM    1. Left displaced femoral neck fracture (H)  S72.002A    2. Fall, initial encounter  W19.XXXA      Scribe Disclosure:  I, Nisha Mayes, am serving as the scribe  for Jw Moran, the scribe trainee, at 6:18 PM on 6/4/2022 to document services personally performed by Dl Diamond DO based on our observations and the provider's statements to us.        Dl Diamond DO  06/04/22 2012

## 2022-06-05 ENCOUNTER — ANESTHESIA EVENT (OUTPATIENT)
Dept: SURGERY | Facility: CLINIC | Age: 87
DRG: 522 | End: 2022-06-05
Payer: MEDICARE

## 2022-06-05 ENCOUNTER — APPOINTMENT (OUTPATIENT)
Dept: GENERAL RADIOLOGY | Facility: CLINIC | Age: 87
DRG: 522 | End: 2022-06-05
Attending: ORTHOPAEDIC SURGERY
Payer: MEDICARE

## 2022-06-05 ENCOUNTER — ANESTHESIA (OUTPATIENT)
Dept: SURGERY | Facility: CLINIC | Age: 87
DRG: 522 | End: 2022-06-05
Payer: MEDICARE

## 2022-06-05 LAB
ALBUMIN UR-MCNC: NEGATIVE MG/DL
APPEARANCE UR: CLEAR
BILIRUB UR QL STRIP: NEGATIVE
COLOR UR AUTO: ABNORMAL
CREAT SERPL-MCNC: 0.49 MG/DL (ref 0.52–1.04)
GFR SERPL CREATININE-BSD FRML MDRD: >90 ML/MIN/1.73M2
GLUCOSE UR STRIP-MCNC: NEGATIVE MG/DL
HGB UR QL STRIP: NEGATIVE
KETONES UR STRIP-MCNC: NEGATIVE MG/DL
LEUKOCYTE ESTERASE UR QL STRIP: ABNORMAL
MUCOUS THREADS #/AREA URNS LPF: PRESENT /LPF
NITRATE UR QL: NEGATIVE
PH UR STRIP: 7 [PH] (ref 5–7)
RBC URINE: 1 /HPF
SP GR UR STRIP: 1.01 (ref 1–1.03)
SQUAMOUS EPITHELIAL: 1 /HPF
UROBILINOGEN UR STRIP-MCNC: NORMAL MG/DL
WBC URINE: 9 /HPF

## 2022-06-05 PROCEDURE — 370N000017 HC ANESTHESIA TECHNICAL FEE, PER MIN: Performed by: ORTHOPAEDIC SURGERY

## 2022-06-05 PROCEDURE — 81001 URINALYSIS AUTO W/SCOPE: CPT | Performed by: PHYSICIAN ASSISTANT

## 2022-06-05 PROCEDURE — C1713 ANCHOR/SCREW BN/BN,TIS/BN: HCPCS | Performed by: ORTHOPAEDIC SURGERY

## 2022-06-05 PROCEDURE — 82565 ASSAY OF CREATININE: CPT | Performed by: ORTHOPAEDIC SURGERY

## 2022-06-05 PROCEDURE — 250N000011 HC RX IP 250 OP 636: Performed by: ANESTHESIOLOGY

## 2022-06-05 PROCEDURE — 0SRS019 REPLACEMENT OF LEFT HIP JOINT, FEMORAL SURFACE WITH METAL SYNTHETIC SUBSTITUTE, CEMENTED, OPEN APPROACH: ICD-10-PCS | Performed by: ORTHOPAEDIC SURGERY

## 2022-06-05 PROCEDURE — 258N000003 HC RX IP 258 OP 636: Performed by: NURSE ANESTHETIST, CERTIFIED REGISTERED

## 2022-06-05 PROCEDURE — 250N000009 HC RX 250: Performed by: NURSE ANESTHETIST, CERTIFIED REGISTERED

## 2022-06-05 PROCEDURE — 99232 SBSQ HOSP IP/OBS MODERATE 35: CPT | Performed by: HOSPITALIST

## 2022-06-05 PROCEDURE — 87086 URINE CULTURE/COLONY COUNT: CPT | Performed by: PHYSICIAN ASSISTANT

## 2022-06-05 PROCEDURE — 258N000003 HC RX IP 258 OP 636: Performed by: ANESTHESIOLOGY

## 2022-06-05 PROCEDURE — 250N000013 HC RX MED GY IP 250 OP 250 PS 637: Performed by: ORTHOPAEDIC SURGERY

## 2022-06-05 PROCEDURE — 360N000077 HC SURGERY LEVEL 4, PER MIN: Performed by: ORTHOPAEDIC SURGERY

## 2022-06-05 PROCEDURE — 250N000009 HC RX 250: Performed by: ORTHOPAEDIC SURGERY

## 2022-06-05 PROCEDURE — 258N000003 HC RX IP 258 OP 636: Performed by: ORTHOPAEDIC SURGERY

## 2022-06-05 PROCEDURE — 272N000001 HC OR GENERAL SUPPLY STERILE: Performed by: ORTHOPAEDIC SURGERY

## 2022-06-05 PROCEDURE — 36415 COLL VENOUS BLD VENIPUNCTURE: CPT | Performed by: ORTHOPAEDIC SURGERY

## 2022-06-05 PROCEDURE — 999N000141 HC STATISTIC PRE-PROCEDURE NURSING ASSESSMENT: Performed by: ORTHOPAEDIC SURGERY

## 2022-06-05 PROCEDURE — 710N000009 HC RECOVERY PHASE 1, LEVEL 1, PER MIN: Performed by: ORTHOPAEDIC SURGERY

## 2022-06-05 PROCEDURE — 250N000011 HC RX IP 250 OP 636: Performed by: NURSE ANESTHETIST, CERTIFIED REGISTERED

## 2022-06-05 PROCEDURE — 250N000013 HC RX MED GY IP 250 OP 250 PS 637: Performed by: PHYSICIAN ASSISTANT

## 2022-06-05 PROCEDURE — 999N000063 XR PELVIS AND HIP PORTABLE LEFT 1 VIEW

## 2022-06-05 PROCEDURE — 250N000011 HC RX IP 250 OP 636: Performed by: ORTHOPAEDIC SURGERY

## 2022-06-05 PROCEDURE — C1776 JOINT DEVICE (IMPLANTABLE): HCPCS | Performed by: ORTHOPAEDIC SURGERY

## 2022-06-05 PROCEDURE — 120N000001 HC R&B MED SURG/OB

## 2022-06-05 PROCEDURE — C1734 ORTH/DEVIC/DRUG BN/BN,TIS/BN: HCPCS | Performed by: ORTHOPAEDIC SURGERY

## 2022-06-05 DEVICE — BONE CEMENT STRK SIMPLEX P SPEEDSET 6192-1-001: Type: IMPLANTABLE DEVICE | Site: HIP | Status: FUNCTIONAL

## 2022-06-05 DEVICE — UNIVERSAL DISTAL CEMENT SPACER
Type: IMPLANTABLE DEVICE | Site: HIP | Status: FUNCTIONAL
Brand: OMNIFIT

## 2022-06-05 DEVICE — HEAD COMPONENT
Type: IMPLANTABLE DEVICE | Site: HIP | Status: FUNCTIONAL
Brand: UNITRAX

## 2022-06-05 DEVICE — NECK ADJUSTMENT SLEEVE
Type: IMPLANTABLE DEVICE | Site: HIP | Status: FUNCTIONAL
Brand: UNITRAX

## 2022-06-05 RX ORDER — POLYETHYLENE GLYCOL 3350 17 G/17G
17 POWDER, FOR SOLUTION ORAL DAILY
Status: DISCONTINUED | OUTPATIENT
Start: 2022-06-06 | End: 2022-06-08 | Stop reason: HOSPADM

## 2022-06-05 RX ORDER — ONDANSETRON 4 MG/1
4 TABLET, ORALLY DISINTEGRATING ORAL EVERY 30 MIN PRN
Status: DISCONTINUED | OUTPATIENT
Start: 2022-06-05 | End: 2022-06-05 | Stop reason: HOSPADM

## 2022-06-05 RX ORDER — ONDANSETRON 2 MG/ML
4 INJECTION INTRAMUSCULAR; INTRAVENOUS EVERY 30 MIN PRN
Status: DISCONTINUED | OUTPATIENT
Start: 2022-06-05 | End: 2022-06-05 | Stop reason: HOSPADM

## 2022-06-05 RX ORDER — HYDROMORPHONE HCL IN WATER/PF 6 MG/30 ML
0.4 PATIENT CONTROLLED ANALGESIA SYRINGE INTRAVENOUS
Status: DISCONTINUED | OUTPATIENT
Start: 2022-06-05 | End: 2022-06-08 | Stop reason: HOSPADM

## 2022-06-05 RX ORDER — BISACODYL 10 MG
10 SUPPOSITORY, RECTAL RECTAL DAILY PRN
Status: DISCONTINUED | OUTPATIENT
Start: 2022-06-05 | End: 2022-06-08 | Stop reason: HOSPADM

## 2022-06-05 RX ORDER — KETOROLAC TROMETHAMINE 15 MG/ML
15 INJECTION, SOLUTION INTRAMUSCULAR; INTRAVENOUS EVERY 6 HOURS
Status: COMPLETED | OUTPATIENT
Start: 2022-06-05 | End: 2022-06-06

## 2022-06-05 RX ORDER — SODIUM CHLORIDE, SODIUM LACTATE, POTASSIUM CHLORIDE, CALCIUM CHLORIDE 600; 310; 30; 20 MG/100ML; MG/100ML; MG/100ML; MG/100ML
INJECTION, SOLUTION INTRAVENOUS CONTINUOUS
Status: DISCONTINUED | OUTPATIENT
Start: 2022-06-05 | End: 2022-06-06

## 2022-06-05 RX ORDER — TRANEXAMIC ACID 10 MG/ML
1 INJECTION, SOLUTION INTRAVENOUS ONCE
Status: CANCELLED | OUTPATIENT
Start: 2022-06-05

## 2022-06-05 RX ORDER — HYDROMORPHONE HCL IN WATER/PF 6 MG/30 ML
0.2 PATIENT CONTROLLED ANALGESIA SYRINGE INTRAVENOUS
Status: DISCONTINUED | OUTPATIENT
Start: 2022-06-05 | End: 2022-06-08 | Stop reason: HOSPADM

## 2022-06-05 RX ORDER — SODIUM CHLORIDE, SODIUM LACTATE, POTASSIUM CHLORIDE, CALCIUM CHLORIDE 600; 310; 30; 20 MG/100ML; MG/100ML; MG/100ML; MG/100ML
INJECTION, SOLUTION INTRAVENOUS CONTINUOUS
Status: DISCONTINUED | OUTPATIENT
Start: 2022-06-05 | End: 2022-06-05 | Stop reason: HOSPADM

## 2022-06-05 RX ORDER — HYDROMORPHONE HCL IN WATER/PF 6 MG/30 ML
0.2 PATIENT CONTROLLED ANALGESIA SYRINGE INTRAVENOUS EVERY 5 MIN PRN
Status: DISCONTINUED | OUTPATIENT
Start: 2022-06-05 | End: 2022-06-05 | Stop reason: HOSPADM

## 2022-06-05 RX ORDER — AMOXICILLIN 250 MG
1 CAPSULE ORAL 2 TIMES DAILY
Status: DISCONTINUED | OUTPATIENT
Start: 2022-06-05 | End: 2022-06-08 | Stop reason: HOSPADM

## 2022-06-05 RX ORDER — CELECOXIB 200 MG/1
400 CAPSULE ORAL ONCE
Status: COMPLETED | OUTPATIENT
Start: 2022-06-05 | End: 2022-06-05

## 2022-06-05 RX ORDER — PROCHLORPERAZINE MALEATE 5 MG
5 TABLET ORAL EVERY 6 HOURS PRN
Status: DISCONTINUED | OUTPATIENT
Start: 2022-06-05 | End: 2022-06-08 | Stop reason: HOSPADM

## 2022-06-05 RX ORDER — TRANEXAMIC ACID 10 MG/ML
1 INJECTION, SOLUTION INTRAVENOUS ONCE
Status: COMPLETED | OUTPATIENT
Start: 2022-06-05 | End: 2022-06-05

## 2022-06-05 RX ORDER — ONDANSETRON 4 MG/1
4 TABLET, ORALLY DISINTEGRATING ORAL EVERY 6 HOURS PRN
Status: DISCONTINUED | OUTPATIENT
Start: 2022-06-05 | End: 2022-06-08 | Stop reason: HOSPADM

## 2022-06-05 RX ORDER — VANCOMYCIN HYDROCHLORIDE 1 G/20ML
INJECTION, POWDER, LYOPHILIZED, FOR SOLUTION INTRAVENOUS PRN
Status: DISCONTINUED | OUTPATIENT
Start: 2022-06-05 | End: 2022-06-05 | Stop reason: HOSPADM

## 2022-06-05 RX ORDER — TRANEXAMIC ACID 10 MG/ML
1 INJECTION, SOLUTION INTRAVENOUS CONTINUOUS
Status: CANCELLED | OUTPATIENT
Start: 2022-06-05

## 2022-06-05 RX ORDER — CEFAZOLIN SODIUM 1 G/3ML
1 INJECTION, POWDER, FOR SOLUTION INTRAMUSCULAR; INTRAVENOUS EVERY 8 HOURS
Status: COMPLETED | OUTPATIENT
Start: 2022-06-05 | End: 2022-06-06

## 2022-06-05 RX ORDER — PROPOFOL 10 MG/ML
INJECTION, EMULSION INTRAVENOUS CONTINUOUS PRN
Status: DISCONTINUED | OUTPATIENT
Start: 2022-06-05 | End: 2022-06-05

## 2022-06-05 RX ORDER — ACETAMINOPHEN 325 MG/1
975 TABLET ORAL EVERY 8 HOURS
Status: DISCONTINUED | OUTPATIENT
Start: 2022-06-05 | End: 2022-06-08 | Stop reason: HOSPADM

## 2022-06-05 RX ORDER — BUPIVACAINE HYDROCHLORIDE 7.5 MG/ML
INJECTION, SOLUTION INTRASPINAL
Status: COMPLETED | OUTPATIENT
Start: 2022-06-05 | End: 2022-06-05

## 2022-06-05 RX ORDER — CEFAZOLIN SODIUM/WATER 2 G/20 ML
2 SYRINGE (ML) INTRAVENOUS
Status: COMPLETED | OUTPATIENT
Start: 2022-06-05 | End: 2022-06-05

## 2022-06-05 RX ORDER — ENOXAPARIN SODIUM 100 MG/ML
40 INJECTION SUBCUTANEOUS EVERY 24 HOURS
Status: DISCONTINUED | OUTPATIENT
Start: 2022-06-06 | End: 2022-06-08 | Stop reason: HOSPADM

## 2022-06-05 RX ORDER — PROPOFOL 10 MG/ML
INJECTION, EMULSION INTRAVENOUS PRN
Status: DISCONTINUED | OUTPATIENT
Start: 2022-06-05 | End: 2022-06-05

## 2022-06-05 RX ORDER — SODIUM CHLORIDE, SODIUM LACTATE, POTASSIUM CHLORIDE, CALCIUM CHLORIDE 600; 310; 30; 20 MG/100ML; MG/100ML; MG/100ML; MG/100ML
INJECTION, SOLUTION INTRAVENOUS CONTINUOUS
Status: DISCONTINUED | OUTPATIENT
Start: 2022-06-05 | End: 2022-06-05

## 2022-06-05 RX ORDER — CEFAZOLIN SODIUM/WATER 2 G/20 ML
2 SYRINGE (ML) INTRAVENOUS SEE ADMIN INSTRUCTIONS
Status: DISCONTINUED | OUTPATIENT
Start: 2022-06-05 | End: 2022-06-05

## 2022-06-05 RX ORDER — OXYCODONE HYDROCHLORIDE 5 MG/1
5 TABLET ORAL EVERY 4 HOURS PRN
Status: DISCONTINUED | OUTPATIENT
Start: 2022-06-05 | End: 2022-06-08 | Stop reason: HOSPADM

## 2022-06-05 RX ORDER — ONDANSETRON 2 MG/ML
4 INJECTION INTRAMUSCULAR; INTRAVENOUS EVERY 6 HOURS PRN
Status: DISCONTINUED | OUTPATIENT
Start: 2022-06-05 | End: 2022-06-08 | Stop reason: HOSPADM

## 2022-06-05 RX ORDER — OXYCODONE HYDROCHLORIDE 5 MG/1
10 TABLET ORAL EVERY 4 HOURS PRN
Status: DISCONTINUED | OUTPATIENT
Start: 2022-06-05 | End: 2022-06-08 | Stop reason: HOSPADM

## 2022-06-05 RX ORDER — BUPIVACAINE HYDROCHLORIDE 2.5 MG/ML
INJECTION, SOLUTION EPIDURAL; INFILTRATION; INTRACAUDAL
Status: COMPLETED | OUTPATIENT
Start: 2022-06-05 | End: 2022-06-05

## 2022-06-05 RX ORDER — ACETAMINOPHEN 325 MG/1
975 TABLET ORAL ONCE
Status: COMPLETED | OUTPATIENT
Start: 2022-06-05 | End: 2022-06-05

## 2022-06-05 RX ORDER — ACETAMINOPHEN 325 MG/1
650 TABLET ORAL EVERY 4 HOURS PRN
Status: DISCONTINUED | OUTPATIENT
Start: 2022-06-08 | End: 2022-06-08 | Stop reason: HOSPADM

## 2022-06-05 RX ORDER — CEFTRIAXONE 1 G/1
1 INJECTION, POWDER, FOR SOLUTION INTRAMUSCULAR; INTRAVENOUS EVERY 24 HOURS
Status: DISCONTINUED | OUTPATIENT
Start: 2022-06-06 | End: 2022-06-07

## 2022-06-05 RX ORDER — FENTANYL CITRATE 0.05 MG/ML
25 INJECTION, SOLUTION INTRAMUSCULAR; INTRAVENOUS EVERY 5 MIN PRN
Status: DISCONTINUED | OUTPATIENT
Start: 2022-06-05 | End: 2022-06-05 | Stop reason: HOSPADM

## 2022-06-05 RX ORDER — LIDOCAINE 40 MG/G
CREAM TOPICAL
Status: DISCONTINUED | OUTPATIENT
Start: 2022-06-05 | End: 2022-06-08 | Stop reason: HOSPADM

## 2022-06-05 RX ORDER — GLYCOPYRROLATE 0.2 MG/ML
INJECTION, SOLUTION INTRAMUSCULAR; INTRAVENOUS PRN
Status: DISCONTINUED | OUTPATIENT
Start: 2022-06-05 | End: 2022-06-05

## 2022-06-05 RX ORDER — TRANEXAMIC ACID 650 MG/1
1950 TABLET ORAL ONCE
Status: DISCONTINUED | OUTPATIENT
Start: 2022-06-05 | End: 2022-06-05 | Stop reason: CLARIF

## 2022-06-05 RX ADMIN — PHENYLEPHRINE HYDROCHLORIDE 100 MCG: 10 INJECTION INTRAVENOUS at 09:46

## 2022-06-05 RX ADMIN — TRANEXAMIC ACID 1 G: 10 INJECTION, SOLUTION INTRAVENOUS at 09:34

## 2022-06-05 RX ADMIN — GLYCOPYRROLATE 0.2 MG: 0.2 INJECTION, SOLUTION INTRAMUSCULAR; INTRAVENOUS at 10:02

## 2022-06-05 RX ADMIN — PROPOFOL 30 MG: 10 INJECTION, EMULSION INTRAVENOUS at 09:22

## 2022-06-05 RX ADMIN — SENNOSIDES AND DOCUSATE SODIUM 1 TABLET: 50; 8.6 TABLET ORAL at 20:08

## 2022-06-05 RX ADMIN — ACETAMINOPHEN 650 MG: 325 SUSPENSION ORAL at 21:57

## 2022-06-05 RX ADMIN — PHENYLEPHRINE HYDROCHLORIDE 100 MCG: 10 INJECTION INTRAVENOUS at 09:43

## 2022-06-05 RX ADMIN — TRANEXAMIC ACID 1 G: 10 INJECTION, SOLUTION INTRAVENOUS at 10:52

## 2022-06-05 RX ADMIN — KETOROLAC TROMETHAMINE 15 MG: 15 INJECTION, SOLUTION INTRAMUSCULAR; INTRAVENOUS at 19:58

## 2022-06-05 RX ADMIN — ACETAMINOPHEN 650 MG: 325 SUSPENSION ORAL at 05:56

## 2022-06-05 RX ADMIN — SODIUM CHLORIDE, POTASSIUM CHLORIDE, SODIUM LACTATE AND CALCIUM CHLORIDE: 600; 310; 30; 20 INJECTION, SOLUTION INTRAVENOUS at 09:17

## 2022-06-05 RX ADMIN — BUPIVACAINE HYDROCHLORIDE 35 ML: 2.5 INJECTION, SOLUTION EPIDURAL; INFILTRATION; INTRACAUDAL; PERINEURAL at 09:11

## 2022-06-05 RX ADMIN — DONEPEZIL HYDROCHLORIDE 5 MG: 5 TABLET, FILM COATED ORAL at 21:53

## 2022-06-05 RX ADMIN — PHENYLEPHRINE HYDROCHLORIDE 0.4 MCG/KG/MIN: 10 INJECTION INTRAVENOUS at 09:58

## 2022-06-05 RX ADMIN — PHENYLEPHRINE HYDROCHLORIDE 100 MCG: 10 INJECTION INTRAVENOUS at 09:35

## 2022-06-05 RX ADMIN — PHENYLEPHRINE HYDROCHLORIDE 100 MCG: 10 INJECTION INTRAVENOUS at 09:58

## 2022-06-05 RX ADMIN — ACETAMINOPHEN 975 MG: 325 TABLET ORAL at 17:33

## 2022-06-05 RX ADMIN — LATANOPROST 1 DROP: 50 SOLUTION/ DROPS OPHTHALMIC at 22:02

## 2022-06-05 RX ADMIN — PROPOFOL 20 MG: 10 INJECTION, EMULSION INTRAVENOUS at 09:39

## 2022-06-05 RX ADMIN — DORZOLAMIDE HYDROCHLORIDE AND TIMOLOL MALEATE 1 DROP: 20; 5 SOLUTION/ DROPS OPHTHALMIC at 20:09

## 2022-06-05 RX ADMIN — PHENYLEPHRINE HYDROCHLORIDE 100 MCG: 10 INJECTION INTRAVENOUS at 10:36

## 2022-06-05 RX ADMIN — PHENYLEPHRINE HYDROCHLORIDE 100 MCG: 10 INJECTION INTRAVENOUS at 09:59

## 2022-06-05 RX ADMIN — PHENYLEPHRINE HYDROCHLORIDE 100 MCG: 10 INJECTION INTRAVENOUS at 09:53

## 2022-06-05 RX ADMIN — ACETAMINOPHEN 650 MG: 325 SUSPENSION ORAL at 01:04

## 2022-06-05 RX ADMIN — ACETAMINOPHEN 325 MG: 325 TABLET ORAL at 08:27

## 2022-06-05 RX ADMIN — PROPOFOL 60 MCG/KG/MIN: 10 INJECTION, EMULSION INTRAVENOUS at 09:39

## 2022-06-05 RX ADMIN — CEFAZOLIN 1 G: 1 INJECTION, POWDER, FOR SOLUTION INTRAMUSCULAR; INTRAVENOUS at 17:34

## 2022-06-05 RX ADMIN — PHENYLEPHRINE HYDROCHLORIDE 100 MCG: 10 INJECTION INTRAVENOUS at 11:13

## 2022-06-05 RX ADMIN — BUPIVACAINE HYDROCHLORIDE IN DEXTROSE 1.6 ML: 7.5 INJECTION, SOLUTION SUBARACHNOID at 09:32

## 2022-06-05 RX ADMIN — CELECOXIB 400 MG: 200 CAPSULE ORAL at 08:27

## 2022-06-05 RX ADMIN — Medication 2 G: at 09:30

## 2022-06-05 RX ADMIN — SODIUM CHLORIDE, POTASSIUM CHLORIDE, SODIUM LACTATE AND CALCIUM CHLORIDE: 600; 310; 30; 20 INJECTION, SOLUTION INTRAVENOUS at 14:06

## 2022-06-05 ASSESSMENT — ACTIVITIES OF DAILY LIVING (ADL)
ADLS_ACUITY_SCORE: 40
ADLS_ACUITY_SCORE: 44
ADLS_ACUITY_SCORE: 40
ADLS_ACUITY_SCORE: 42
ADLS_ACUITY_SCORE: 44
ADLS_ACUITY_SCORE: 34
ADLS_ACUITY_SCORE: 44
ADLS_ACUITY_SCORE: 42
ADLS_ACUITY_SCORE: 34
ADLS_ACUITY_SCORE: 40

## 2022-06-05 ASSESSMENT — ENCOUNTER SYMPTOMS
DYSRHYTHMIAS: 0
ORTHOPNEA: 0
SEIZURES: 0

## 2022-06-05 NOTE — PROGRESS NOTES
Ortho aware of consult. Left displaced femoral neck fracture.  H/o dementia.    Plan:  - To OR tomorrow for L hip hemiarthroplasty  - NPO for OR tomorrow per anesthesia guidelines  - Ortho consult to follow tomorrow

## 2022-06-05 NOTE — PROGRESS NOTES
Arrived from Recovery room.  A & oriented to self and intermittently to place/situation, able to make needs known.   Oriented to room/unit.  Mild surgical pain, ice pack applied.  Pt's sons at the bedside.

## 2022-06-05 NOTE — ANESTHESIA PROCEDURE NOTES
Fascia iliaca Procedure Note    Pre-Procedure   Staff -        Anesthesiologist:  Tj Guerrero MD       Performed By: Anesthesiologist       Location: pre-op       Pre-Anesthestic Checklist: patient identified, IV checked, site marked, risks and benefits discussed, informed consent, monitors and equipment checked, pre-op evaluation, at physician/surgeon's request and post-op pain management  Timeout:       Correct Patient: Yes        Correct Procedure: Yes        Correct Site: Yes        Correct Position: Yes        Correct Laterality: Yes        Site Marked: Yes  Procedure Documentation  Procedure: Fascia iliaca       Laterality: left       Patient Position: supine       Patient Prep/Sterile Barriers: sterile gloves, mask       Skin prep: Chloraprep       Local skin infiltrated with 3 mL of 1% lidocaine.        Needle Type: insulated and short bevel       Needle Gauge: 21.        Needle Length (millimeters): 100        Ultrasound guided       1. Ultrasound was used to identify targeted nerve, plexus, vascular marker, or fascial plane and place a needle adjacent to it in real-time.       2. Ultrasound was used to visualize the spread of anesthetic in close proximity to the above referenced structure.       3. A permanent image is entered into the patient's record.       4. The visualized anatomic structures appeared normal.       5. There were no apparent abnormal pathologic findings.    Assessment/Narrative         The placement was negative for: blood aspirated, painful injection and site bleeding       Paresthesias: No.       Bolus given via needle. no blood aspirated via catheter.        Secured via.        Insertion/Infusion Method: Single Shot       Complications: none    Medication(s) Administered   Bupivacaine 0.25% PF (Infiltration) - Infiltration   35 mL - 6/5/2022 9:11:00 AM   Comments:  Ultrasound Interpretation, Peripheral Nerve Block    1. Under ultrasound guidance, the needle was inserted and  placed in close proximity to the target nerve(s).  2. Ultrasound was also used to visualize the spread of the anesthetic in close proximity to the nerve(s) being blocked.  Local anesthetic was administered in incremental doses, with intermittent negative aspiration.    3. The nerve(s) appeared anatomically normal.  4. There were no apparent abnormal pathological findings.  5. A permanent ultrasound image was saved in the patient's record.    Pt tolerated well.    No complications.    Block placed in Preop prior to induction for post op pain control.     The surgeon has given a verbal order transferring care of this patient to me for the performance of a regional analgesia block for post-op pain control. It is requested of me because I am uniquely trained and qualified to perform this block and the surgeon is neither trained nor qualified to perform this procedure.

## 2022-06-05 NOTE — OP NOTE
[facility]  Orthopedic Operative Note    Anterior Lateral Hip Hemiarthroplasty     Khloe Sparks MRN# [id number]   YOB: 1935  Procedure Date:  6/5/2022  Age: 86 year old       PREOPERATIVE DIAGNOSIS:  Displaced femoral neck fracture, left hip.    POSTOPERATIVE DIAGNOSIS:  Displaced femoral neck fracture,left hip.    PROCEDURE PERFORMED:  Left hip hemiarthroplasty.  Anterolateral approach.    SURGEON:  Chacho Dalton MD    FIRST ASSISTANT:  Rj BALBUENA, whose was critical for positioning, retraction during exposure, placement of implants, and closure.     ANESTHESIA:  Regional and spinal     ESTIMATED BLOOD LOSS:  100 mL.       IMPLANTS:  .  Implant Name Type Inv. Item Serial No.  Lot No. LRB No. Used Action   BONE CEMENT STRK SIMPLEX P SPEEDSET 6192-1-001 - [id number] Cement, Bone BONE CEMENT STRK SIMPLEX P SPEEDSET 6192-1-001  [employer] [id number] Left 2 Implanted   ANUM BIOPREP BONE PREP KIT WITH MEDIUM CEMENT RESTRICTOR      [id number] Left 1 Implanted   IMP HEAD STRK ENDO UNITRAX MODULAR 45MM 6942-5-045 - [id number] Total Joint Component/Insert IMP HEAD STRK ENDO UNITRAX MODULAR 45MM 6942-5-045  [employer] [place] [id number] Left 1 Implanted   ANUM CEMENTED HIP STEM #7, 35MM, 130MM    ANUM [id number] Left 1 Implanted   IMP SPACER OSTEONICS DISTAL CEMENT 12MM 3690-4649 - [id number] Metallic Hardware/[place] IMP SPACER OSTEONICS DISTAL CEMENT 12MM 8071-5084  [employer] [id number] Left 1 Implanted   IMP INSERT SLEEVE STRK UNITRAX C-TAPER +5MM 6942-7-075 - [id number] Total Joint Component/Insert IMP INSERT SLEEVE STRK UNITRAX C-TAPER +5MM 6942-7-075  [employer] [place] [id number] Left 1 Implanted        INDICATIONS FOR PROCEDURE: Khloe is a 86 year old female with a left displaced femoral neck fracture. We discussed nonoperative and various operative treatment options including hemiarthroplasty and total hip arthroplasty. Risks of bleeding,  infection, damage to surrounding neurovascular structures, hip dislocation, intraoperative or postoperative periprosthetic fracture, leg length inequality, blood clots including deep vein thrombosis and pulmonary embolism and anesthetic complications were discussed with patient.  Benefits of surgery discussed included improved function, reduction medical risk of hip fractures, and pain relief.  Alternatives include nonoperative management, which was not recommended.  The patient and family understands and wishes to proceed.  Consent signed by her son.      DESCRIPTION OF PROCEDURE:  The patient was identified in the preoperative holding area per hospital policy and the correct operative site marked. Patient was brought into the to the operating room and placed supine on the operating room table.  After induction of general anesthesia he was placed into a right lateral decubitus position on hip positioner and all bony prominences well-padded. Chlorhexidine prescrub was performed followed by prepping and draping in normal sterile fashion.  Antibiotic administration and trans-examined acid administration were confirmed and timeout was performed per standard protocol.       A lateral incision was made centered over the greater trochanter proceeding sharply the skin and subtenons tissue down the fascia.  Fascia was incised in line with the incision.  A bursectomy was performed.  The anterior one third of the abductors were lifted off the anterior aspect of the femur using cautery.  A T-capsulotomy was performed.  Posteromedial release was performed extending to the superior aspect the lesser trochanter. Capsule was tagged with #1 Vicryl.  The femoral neck was cut maintaining approximately 12 mm of neck proximal to the lesser trochanter. The head was removed with a corkscrew and noted to be sized at a 45 mm.  We found a 45 mm head to fit most appropriately in the socket.  We copiously irrigated the hip socket, removing  all bony debris.  The box osteotome utilized to access the proximal femoral canal.  We reamed and broached to a size 7 broach trial.  We trialed with the appropriate length neck trial and +0 mm inner diameter head.  Limb lengths were symmetric, however there was a bit too much shuck present.  We trialed with a +5 mm inner head, and her left lower extremity appeared a few millimeters long, however stability at the hip was improved.  The hip was stable in all positions including position sleep.  No shuck was noted.  Without complete we cemented the size 7 Ck stem using second generation cement technique.  Excess cement was removed and the cup was cleared of any cement.  The stem advanced the same level as the broach as such the real head and bipolar shell were impacted and placed in standard fashion.  The hip was reduced.    We performed a Betadine lavage per protocol with 15 cc of 10% Betadine and 500 cc of sterile saline.  Thoroughly irrigated the wound.  The capsule closed with #1 Vicryl.  We repaired down the gluteus minimus, and gluteus medius through bony tunnels with #5 Ethibond.  A local anesthetic mixture was not used as the patient received both a block and spinal anesthesia.  We then closed the wound in layers with #1 Quill or stratafix in the fascia, 2-0 Monocryl in the subcutaneous tissue, and staples in the skin.  Sterile dressings were applied with Aquacel Ag.  The patient was awoken from anesthesia and transported to the recovery room in stable condition, sustaining no complications.     Plan:  1.  Weightbearing as tolerated with assistive devices as needed x 6 weeks and and as needed thereafter.   2.  Ancef x 24 hours.   3.  Lovenox in the hospital, then upon discharge transition to Aspirin enteric coated 81 mg BID for DVT prophylaxis for a total of 6 weeks.   4.  PACU x-rays AP pelvis  5.  Physical therapy/occupational therapy.   6.  Osteoporosis workup and treatment.   7.  Follow-up in 2-week wound  check with x-rays .

## 2022-06-05 NOTE — H&P
[facility]    History and Physical - Hospitalist Service       Date of Admission:  6/4/2022  Dictation #: [id number]  Brief Summary (see dictation for more details): 86F hx Htn, Dementia, dyslipidemia presents with mechanical fall and left femoral neck fracture.  NPO at midnight for possible surgical repair.  Ortho consult.  Pain control prn.     Clinically Significant Risk Factors Present on Admission                        Rajat Kim, DO  Hospitalist Service  [facility]  Securely message with the Vocera Web Console (learn more here)  Text page via [facility] Paging/Directory

## 2022-06-05 NOTE — ANESTHESIA PROCEDURE NOTES
Intrathecal Procedure Note    Pre-Procedure   Staff -        Anesthesiologist:  Tj Guerrero MD       Performed By: anesthesiologist       Location: OR       Pre-Anesthestic Checklist: patient identified, IV checked, site marked, risks and benefits discussed, informed consent, monitors and equipment checked and pre-op evaluation  Timeout:       Correct Patient: Yes        Correct Procedure: Yes        Correct Site: Yes        Correct Position: Yes   Procedure Documentation  Procedure: intrathecal       Patient Position: sitting       Patient Prep/Sterile Barriers: sterile gloves, mask, patient draped       Skin prep: Betadine       Insertion Site: L3-4. (right paramedian approach).       Needle Gauge: 22.        Needle Length (Inches): 4        Spinal Needle Type: Quincke       Introducer used       Introducer: 20 G       # of attempts: 2 and  # of redirects:  3    Assessment/Narrative         Paresthesias: No.       CSF fluid: clear.    Medication(s) Administered   0.75% Hyperbaric Bupivacaine (Intrathecal) - Intrathecal   1.6 mL - 6/5/2022 9:32:00 AM   Comments:  Patient tolerated procedure well   No complications

## 2022-06-05 NOTE — PROGRESS NOTES
RECEIVING UNIT ED HANDOFF REVIEW    ED Nurse Handoff Report was reviewed by: Gretta Wright RN on June 4, 2022 at 7:13 PM

## 2022-06-05 NOTE — PLAN OF CARE
Goal Outcome Evaluation:    Date/Time: 6/5/22  11p -3317     Trauma/Ortho/Medical: Ortho    Diagnosis:Left Hip Displacement  POD# N/A for possible surgery today for Left Matthew arthroplasty  Mental Status: Baseline dementia/ with confusion  Activity/dangle: Bedrest  Diet:On NPO  post midnight  Pain: Scheduled  Tylenol this shift  Saab/Voiding; Purewick no output, retaining. Bladder scanned at 6AM with 160   Tele/Restraints/Iso:N/A  02/LDA: Room air. PIV running with NS at 75 ml/hr  D/C Date: N/A  Other Info: repeats what you are saying.  Dressing on the shin intact.  To continue to monitor.

## 2022-06-05 NOTE — H&P
Admitted: 06/04/2022    MEDICINE ADMISSION    PRIMARY CARE PHYSICIAN:  Gina Jackson MD    CODE STATUS:  Full Code.    CHIEF COMPLAINT:  Left hip pain.    HISTORY OF PRESENT ILLNESS:  Ms. Sparks is an 86-year-old female with a past medical history significant for hypertension, dyslipidemia, dementia, who presented to the Emergency Department with left hip pain after a mechanical fall.  History is obtained through discussion with the ED physician, the patient and her son at bedside.  The patient lives at home with her son and was out for a walk today in the back of her home and unfortunately upon coming back into the house, fell when she missed a step.  She fell backwards, landing on her left side causing pain in her left hip.  She was unable to bear weight, so EMS was summoned.  She was brought to the Emergency Department where she was diagnosed with a left femoral neck fracture.  She is fairly comfortable upon lying still and does not have much pain.  She does not fall regularly.  She was recently diagnosed with dementia and is at her baseline.  She has not had any recent changes to her medications.  She denies any chest pain, shortness of breath, abdominal pain, nausea or vomiting or change in her bowel habits.  No recent fevers or chills.  She has not had surgery for many, many years and they did not recall having any issues.  She is otherwise able to get around and walk at baseline without any chest pain or shortness of breath.    PAST MEDICAL HISTORY:  1.  Hypertension.  2.  Dyslipidemia.  3.  Glaucoma.  4.  Dementia.    MEDICATIONS:    Medications Prior to Admission   Medication Sig Dispense Refill Last Dose     atorvastatin (LIPITOR) 40 MG tablet Take 0.5 tablets (20 mg) by mouth daily 90 tablet 3 6/3/2022 at Unknown time     donepezil (ARICEPT) 5 MG tablet Take 1 tablet (5 mg) by mouth At Bedtime 90 tablet 3 6/3/2022 at Unknown time     dorzolamide-timolol (COSOPT) 2-0.5 % ophthalmic solution Place 1 drop  Into the left eye 2 times daily  1 6/3/2022 at Unknown time     latanoprost (XALATAN) 0.005 % ophthalmic solution instill 1 drop in both eyes at bedtime  3 6/3/2022 at Unknown time     lisinopril (ZESTRIL) 10 MG tablet Take 1 tablet (10 mg) by mouth daily 90 tablet 3 6/3/2022 at Unknown time     multivitamin (THERMEMS) TABS Take 1 tablet by mouth   6/3/2022 at Unknown time     triamcinolone (KENALOG) 0.1 % external cream Apply topically 2 times daily (Patient taking differently: Apply topically 2 times daily as needed (eczema spots on legs)) 15 g 0 Past Week at Unknown time     hydrocortisone (CORTAID) 1 % external cream Apply topically 2 times daily (Patient taking differently: Apply topically 2 times daily as needed) 30 g 3 prn now         SOCIAL HISTORY:  The patient denies any use of tobacco or alcohol.  She lives with her son.    FAMILY HISTORY:  Sister had Alzheimer's and breast cancer, brother had laryngeal cancer, mother had multiple sclerosis, and father had pancreatic cancer.    ALLERGIES:  NO KNOWN DRUG ALLERGIES.    REVIEW OF SYSTEMS:  A complete review of systems reviewed and negative, save for the pertinent positives which are recorded in the HPI.    PHYSICAL EXAMINATION:    VITAL SIGNS:  Vitals show blood pressure of 135/70, heart rate 73, respirations 17, saturating 97% on room air, temperature 97.7 degrees Fahrenheit.   GENERAL:  The patient is lying in bed and resting comfortably.  HEENT:  Pupils equal, round, reactive to light.  Extraocular muscle function intact.  No scleral icterus.  Oropharynx is clear.   NECK:  No lymphadenopathy or thyromegaly.  CARDIOVASCULAR:  Heart is regular rate and rhythm without any murmur, rub or gallop.  PULMONARY:  Lungs are clear to auscultation bilaterally without wheeze or rhonchi.  GASTROINTESTINAL:  Positive bowel sounds, soft, nontender and nondistended.  SKIN:  No new rashes or lesions.  LYMPHATICS:  No peripheral edema.  PSYCHIATRIC:  Alert and oriented x 3,  normal affect.  NEUROLOGIC:  Cranial nerves II-XII are grossly intact.  No focal deficits.    LABORATORY DATA:  CBC, BMP unremarkable.     CT of the hip shows a moderately displaced oblique left femoral neck fracture and multiple gallstones.    EKG shows normal sinus rhythm.    ASSESSMENT AND PLAN:  Ms. Perry is an 86-year-old female with a past medical history significant for hypertension, dyslipidemia, and dementia, who presented to the Emergency Department with a mechanical fall and found to have a left femoral neck fracture.  1.  Left femoral neck fracture due to mechanical fall:  The patient is currently comfortable at bed rest but we will have pain medications available as needed.  She will be n.p.o. at midnight with Orthopedics consultation for possible surgical repair tomorrow.  2.  Hypertension:  Blood pressure is adequately controlled.  We will continue with PTA regimen of lisinopril.  3.  Dementia:  Continue with Aricept.  4.  Dyslipidemia:  Can resume Lipitor upon discharge.  5.  Glaucoma:  Continue with Cosopt and Xalatan.  6.  Disposition:  Brought in under inpatient status given hip fracture and expect at least 2-3 nights in the hospital with likely TCU requirement.      Rajat Kim DO        D: 2022   T: 2022   MT: [id number]    Name:     JUAN PERRY  MRN:      [id number]        Account:     [id number]   :      1935           Admitted:    2022       Document: [id number]    cc:  Gina Jackson MD

## 2022-06-05 NOTE — ANESTHESIA POSTPROCEDURE EVALUATION
Patient: Khloe Sparks    Procedure: Procedure(s):  LEFT Hip Hemiarthroplasty       Anesthesia Type:  Spinal    Note:     Postop Pain Control: Uneventful            Sign Out: Well controlled pain   PONV: No   Neuro/Psych: Uneventful            Sign Out: Acceptable/Baseline neuro status   Airway/Respiratory: Uneventful            Sign Out: Acceptable/Baseline resp. status   CV/Hemodynamics: Uneventful            Sign Out: Acceptable CV status; No obvious hypovolemia; No obvious fluid overload   Other NRE: NONE   DID A NON-ROUTINE EVENT OCCUR? No           Last vitals:  Vitals Value Taken Time   /66 06/05/22 1310   Temp 36.2  C (97.1  F) 06/05/22 1210   Pulse 52 06/05/22 1311   Resp 19 06/05/22 1313   SpO2 99 % 06/05/22 1310   Vitals shown include unvalidated device data.    Electronically Signed By: Tj Guerrero MD  June 5, 2022  3:01 PM

## 2022-06-05 NOTE — PLAN OF CARE
Goal Outcome Evaluation:    Plan of Care Reviewed With: patient, son     Overall Patient Progress: no change     Date/Time: 6/4 3-11:30 shift    Trauma/Ortho/Medical (Choose one) Ortho    Diagnosis: L hip fx s/t fall  POD#: N/A, hopefully having surgery tomorrow  Mental Status: Some baseline confusion/dementia. Difficult to determine orientation due to difficulty speaking. Some trouble word finding, speech is forced. Seems aware of situation. Alert.  Activity/dangle: Bedrest  Diet: Regular, NPO after midnight  Pain: Severe with movement, ok at rest.   Saab/Voiding: Has Purewick in place, has not voided this shift, was straight cathed in ED.   Tele/Restraints/Iso: No  02/LDA: PIV L arm came out, was replaced and put on R arm. NS running at 75 mL/hr.   D/C Date: Estimated 2 days  Other Info: Arrived to floor at 1940 accompanied by son. Elia on L knee wrapped in Kerlix in ED.

## 2022-06-05 NOTE — ANESTHESIA CARE TRANSFER NOTE
Patient: Khloe Sparks    Procedure: Procedure(s):  LEFT Hip Hemiarthroplasty       Diagnosis: Left displaced femoral neck fracture (H) [S72.002A]  Diagnosis Additional Information: No value filed.    Anesthesia Type:   Spinal     Note:    Oropharynx: oropharynx clear of all foreign objects  Level of Consciousness: awake  Oxygen Supplementation: room air    Independent Airway: airway patency satisfactory and stable  Dentition: dentition unchanged  Vital Signs Stable: post-procedure vital signs reviewed and stable  Report to RN Given: handoff report given  Patient transferred to: PACU    Handoff Report: Identifed the Patient, Identified the Reponsible Provider, Reviewed the pertinent medical history, Discussed the surgical course, Reviewed Intra-OP anesthesia mangement and issues during anesthesia, Set expectations for post-procedure period and Allowed opportunity for questions and acknowledgement of understanding      Vitals:  Vitals Value Taken Time   /66 06/05/22 1310   Temp 36.2  C (97.1  F) 06/05/22 1210   Pulse 52 06/05/22 1311   Resp 19 06/05/22 1313   SpO2 99 % 06/05/22 1310   Vitals shown include unvalidated device data.    Electronically Signed By: RAVINDRA Hwang CRNA  June 5, 2022  2:12 PM

## 2022-06-05 NOTE — ANESTHESIA PREPROCEDURE EVALUATION
Anesthesia Pre-Procedure Evaluation    Patient: Khloe Sparks   MRN: [id number] : 1935        Procedure : Procedure(s):  Hip hemiarthroplasty          Past Medical History:   Diagnosis Date     Dyslipidemia      Hypertension       Past Surgical History:   Procedure Laterality Date     HC REMOVE TONSILS/ADENOIDS,<11 Y/O      Description: Tonsillectomy With Adenoidectomy;  Recorded: 2010;     AL CLOSED RX DIST RAD/ULNA FX,MANIPUL      Description: Closed Treatment Of Fracture Of Distal Radius, Manipulation;  Proc Date: 2002;  Comments: fell from ladder     AL CLOSED RX MID HUMERUS FRACTURE      Description: Closed Treat Of Fracture Of The Humeral Shaft;  Proc Date: 2007;  Comments: fell from wall      No Known Allergies   Social History     Tobacco Use     Smoking status: Former Smoker     Quit date: 1975     Years since quittin.8     Smokeless tobacco: Never Used     Tobacco comment: quit     Substance Use Topics     Alcohol use: Yes     Comment: Alcoholic Drinks/day: glass per week      Wt Readings from Last 1 Encounters:   22 59 kg (130 lb)      EKG - SR  Anesthesia Evaluation   Pt has had prior anesthetic.     No history of anesthetic complications       ROS/MED HX  ENT/Pulmonary: Comment: glaucoma   (-) sleep apnea and recent URI   Neurologic: Comment: Memory loss    (+) dementia,  (-) no seizures, no CVA and no TIA   Cardiovascular:     (+) Dyslipidemia hypertension----- (-) CHF, orthopnea/PND and arrhythmias   METS/Exercise Tolerance:     Hematologic:  - neg hematologic  ROS     Musculoskeletal:   (+) fracture (mechanical fall), Fracture location: LLE,     GI/Hepatic:     (+) cholecystitis/cholelithiasis,  (-) GERD   Renal/Genitourinary:  - neg Renal ROS     Endo:    (-) Type II DM and thyroid disease   Psychiatric/Substance Use:  - neg psychiatric ROS     Infectious Disease:  - neg infectious disease ROS     Malignancy:   (+) Malignancy, History of Skin.     Other:            Physical Exam    Airway  airway exam normal      Mallampati: II   TM distance: > 3 FB   Neck ROM: full   Mouth opening: > 3 cm    Respiratory Devices and Support         Dental  no notable dental history         Cardiovascular   cardiovascular exam normal       Rhythm and rate: regular and normal     Pulmonary   pulmonary exam normal        breath sounds clear to auscultation           OUTSIDE LABS:  CBC:   Lab Results   Component Value Date    WBC 9.0 06/04/2022    WBC 6.2 04/15/2022    HGB 13.2 06/04/2022    HGB 14.3 04/15/2022    HCT 40.0 06/04/2022    HCT 44.8 04/15/2022     06/04/2022     04/15/2022     BMP:   Lab Results   Component Value Date     06/04/2022     04/15/2022    POTASSIUM 3.8 06/04/2022    POTASSIUM 4.3 04/15/2022    CHLORIDE 105 06/04/2022    CHLORIDE 109 04/15/2022    CO2 27 06/04/2022    CO2 26 04/15/2022    BUN 16 06/04/2022    BUN 21 04/15/2022    CR 0.60 06/04/2022    CR 0.63 04/15/2022     (H) 06/04/2022    GLC 93 04/15/2022     COAGS: No results found for: PTT, INR, FIBR  POC: No results found for: BGM, HCG, HCGS  HEPATIC:   Lab Results   Component Value Date    ALBUMIN 3.9 04/15/2022    PROTTOTAL 7.1 04/15/2022    ALT 28 04/15/2022    AST 18 04/15/2022    ALKPHOS 91 04/15/2022    BILITOTAL 0.3 04/15/2022     OTHER:   Lab Results   Component Value Date    ERICA 8.8 06/04/2022    TSH 3.91 04/15/2022       Anesthesia Plan    ASA Status:  3      Anesthesia Type: Spinal.              Consents    Anesthesia Plan(s) and associated risks, benefits, and realistic alternatives discussed. Questions answered and patient/representative(s) expressed understanding.    - Discussed:     - Discussed with:  Patient (Son)         Postoperative Care    Pain management: Multi-modal analgesia, Peripheral nerve block (Single Shot).   PONV prophylaxis: Ondansetron (or other 5HT-3), Dexamethasone or Solumedrol     Comments:    Other Comments: Son present during  interview. All questions answered. Discussed with son patient at increased risk of delirium postop            Tj Guerrero MD

## 2022-06-05 NOTE — PROGRESS NOTES
[facility]    Medicine Progress Note - Hospitalist Service    Date of Admission:  6/4/2022    Assessment & Plan        Khloe Sparks is an 86-year-old female with a past medical history significant for hypertension, dyslipidemia, and dementia, who presented to the Emergency Department with a mechanical fall and found to have a left femoral neck fracture.    Left femoral neck fracture due to mechanical fall    Admitted to inpatient.    Orthopedic surgery consulted, appreciate their assistance.    S/p left hip hemiarthroplasty on 6/5/22 by Dr. Dalton.    Post-op cares per orthopedics - DVT prophylaxis, pain medications, etc.    PT/OT consulted.    Care transitions consulted regarding disposition.    Possible UTI  UA suggestive of UTI. UC pending.    Receiving froylan-operative cefazolin today, will start rocephin tomorrow morning while awaiting final culture results.    Hypertension  Blood pressure is adequately controlled.      Continue PTA lisinopril.    Dementia    Continue PTA Aricept.    Dyslipidemia    Resume PTA Lipitor upon discharge.    Glaucoma    Continue PTA Cosopt and Xalatan.       Diet: Advance Diet as Tolerated: Regular Diet Adult    DVT Prophylaxis: Pneumatic Compression Devices  Saab Catheter: Not present  Central Lines: None  Cardiac Monitoring: None  Code Status: Full Code      Disposition Plan   Expected Discharge: 06/07/2022     Anticipated discharge location:  Awaiting care coordination huddle  Delays:            The patient's care was discussed with the Bedside Nurse, Patient and Patient's Family.    Aric Guillen MD  Hospitalist Service  [facility]  Securely message with the Vocera Web Console (learn more here)  Text page via [employer] Paging/Directory         Clinically Significant Risk Factors Present on Admission                      ______________________________________________________________________    Interval History   Khloe OSORIO  Bridger was seen this afternoon. She had a left hip hemiarthroplasty earlier today, tolerated the procedure well. She was seen in her room on the orthopedics unit post-operatively. Currently feels good and has no complaints. Denies pain, chest pain, shortness of breath, nausea, abdominal pain. Her son was in the room with her, discussed plan of care.    Data reviewed today: I reviewed all medications, new labs and imaging results over the last 24 hours. I personally reviewed no images or EKG's today.    Physical Exam   Vital Signs: Temp: (!) 94.6  F (34.8  C) Temp src: Oral BP: 129/74 Pulse: 54   Resp: 18 SpO2: 98 % O2 Device: None (Room air)    Weight: 130 lbs 0 oz  Constitutional: awake, alert, cooperative, no apparent distress, laying in the hospital bed  Respiratory: clear to auscultation bilaterally, no crackles or wheezing  Cardiovascular: regular rate and rhythm, normal S1 and S2, no murmur noted  GI: normal bowel sounds, soft, non-distended, non-tender  Skin: warm, dry  Musculoskeletal: no lower extremity pitting edema present  Neurologic: awake, alert, not oriented, moves all extremities    Data   Recent Labs   Lab 06/05/22  1526 06/04/22  1548   WBC  --  9.0   HGB  --  13.2   MCV  --  89   PLT  --  205   NA  --  138   POTASSIUM  --  3.8   CHLORIDE  --  105   CO2  --  27   BUN  --  16   CR 0.49* 0.60   ANIONGAP  --  6   ERICA  --  8.8   GLC  --  125*     Medications     lactated ringers 75 mL/hr at 06/05/22 1406       acetaminophen  650 mg Oral Q4H While awake     acetaminophen  975 mg Oral Q8H     ceFAZolin  1 g Intravenous Q8H     [START ON 6/6/2022] cefTRIAXone  1 g Intravenous Q24H     donepezil  5 mg Oral At Bedtime     dorzolamide-timolol  1 drop Left Eye BID     [START ON 6/6/2022] enoxaparin ANTICOAGULANT  40 mg Subcutaneous Q24H     ketorolac  15 mg Intravenous Q6H     latanoprost  1 drop Both Eyes At Bedtime     lisinopril  10 mg Oral Daily     [START ON 6/6/2022] polyethylene glycol  17 g Oral  Daily     senna-docusate  1 tablet Oral BID     sodium chloride (PF)  3 mL Intracatheter Q8H

## 2022-06-06 ENCOUNTER — APPOINTMENT (OUTPATIENT)
Dept: PHYSICAL THERAPY | Facility: CLINIC | Age: 87
DRG: 522 | End: 2022-06-06
Payer: MEDICARE

## 2022-06-06 LAB
DEPRECATED CALCIDIOL+CALCIFEROL SERPL-MC: 37 UG/L (ref 20–75)
HGB BLD-MCNC: 12.5 G/DL (ref 11.7–15.7)

## 2022-06-06 PROCEDURE — 99232 SBSQ HOSP IP/OBS MODERATE 35: CPT | Performed by: HOSPITALIST

## 2022-06-06 PROCEDURE — 36415 COLL VENOUS BLD VENIPUNCTURE: CPT | Performed by: ORTHOPAEDIC SURGERY

## 2022-06-06 PROCEDURE — 97530 THERAPEUTIC ACTIVITIES: CPT | Mod: GP | Performed by: PHYSICAL THERAPIST

## 2022-06-06 PROCEDURE — 250N000013 HC RX MED GY IP 250 OP 250 PS 637: Performed by: ORTHOPAEDIC SURGERY

## 2022-06-06 PROCEDURE — 250N000013 HC RX MED GY IP 250 OP 250 PS 637: Performed by: HOSPITALIST

## 2022-06-06 PROCEDURE — 97161 PT EVAL LOW COMPLEX 20 MIN: CPT | Mod: GP | Performed by: PHYSICAL THERAPIST

## 2022-06-06 PROCEDURE — 85018 HEMOGLOBIN: CPT | Performed by: ORTHOPAEDIC SURGERY

## 2022-06-06 PROCEDURE — 250N000011 HC RX IP 250 OP 636: Performed by: ORTHOPAEDIC SURGERY

## 2022-06-06 PROCEDURE — 120N000001 HC R&B MED SURG/OB

## 2022-06-06 PROCEDURE — 250N000011 HC RX IP 250 OP 636: Performed by: HOSPITALIST

## 2022-06-06 RX ADMIN — QUETIAPINE 12.5 MG: 25 TABLET, FILM COATED ORAL at 01:24

## 2022-06-06 RX ADMIN — DORZOLAMIDE HYDROCHLORIDE AND TIMOLOL MALEATE 1 DROP: 20; 5 SOLUTION/ DROPS OPHTHALMIC at 08:39

## 2022-06-06 RX ADMIN — DORZOLAMIDE HYDROCHLORIDE AND TIMOLOL MALEATE 1 DROP: 20; 5 SOLUTION/ DROPS OPHTHALMIC at 20:53

## 2022-06-06 RX ADMIN — KETOROLAC TROMETHAMINE 15 MG: 15 INJECTION, SOLUTION INTRAMUSCULAR; INTRAVENOUS at 14:15

## 2022-06-06 RX ADMIN — CEFTRIAXONE SODIUM 1 G: 1 INJECTION, POWDER, FOR SOLUTION INTRAMUSCULAR; INTRAVENOUS at 08:38

## 2022-06-06 RX ADMIN — KETOROLAC TROMETHAMINE 15 MG: 15 INJECTION, SOLUTION INTRAMUSCULAR; INTRAVENOUS at 01:24

## 2022-06-06 RX ADMIN — SENNOSIDES AND DOCUSATE SODIUM 1 TABLET: 50; 8.6 TABLET ORAL at 08:32

## 2022-06-06 RX ADMIN — KETOROLAC TROMETHAMINE 15 MG: 15 INJECTION, SOLUTION INTRAMUSCULAR; INTRAVENOUS at 08:33

## 2022-06-06 RX ADMIN — ACETAMINOPHEN 975 MG: 325 TABLET ORAL at 15:34

## 2022-06-06 RX ADMIN — ENOXAPARIN SODIUM 40 MG: 40 INJECTION SUBCUTANEOUS at 08:32

## 2022-06-06 RX ADMIN — CEFAZOLIN 1 G: 1 INJECTION, POWDER, FOR SOLUTION INTRAMUSCULAR; INTRAVENOUS at 01:31

## 2022-06-06 RX ADMIN — LATANOPROST 1 DROP: 50 SOLUTION/ DROPS OPHTHALMIC at 20:53

## 2022-06-06 RX ADMIN — ACETAMINOPHEN 975 MG: 325 TABLET ORAL at 08:32

## 2022-06-06 RX ADMIN — DONEPEZIL HYDROCHLORIDE 5 MG: 5 TABLET, FILM COATED ORAL at 20:51

## 2022-06-06 RX ADMIN — LISINOPRIL 10 MG: 10 TABLET ORAL at 08:32

## 2022-06-06 RX ADMIN — SENNOSIDES AND DOCUSATE SODIUM 1 TABLET: 50; 8.6 TABLET ORAL at 20:51

## 2022-06-06 ASSESSMENT — ACTIVITIES OF DAILY LIVING (ADL)
ADLS_ACUITY_SCORE: 40

## 2022-06-06 NOTE — PROGRESS NOTES
06/06/22 0901   Quick Adds   Type of Visit Initial PT Evaluation   Living Environment   People in Home child(henry), adult  (Son Alen)   Current Living Arrangements house   Home Accessibility stairs to enter home;stairs within home   Number of Stairs, Main Entrance 3   Stair Railings, Main Entrance railing on left side (ascending)   Number of Stairs, Within Home, Primary   (Flight of stairs to access bedroom)   Stair Railings, Within Home, Primary railing on left side (ascending)   Transportation Anticipated family or friend will provide   Living Environment Comments Pt's son assists with household tasks.   Self-Care   Usual Activity Tolerance good   Current Activity Tolerance fair   Equipment Currently Used at Home none   Fall history within last six months yes   Number of times patient has fallen within last six months 1   General Information   Onset of Illness/Injury or Date of Surgery 06/04/22   Referring Physician Chacho Dalton MD   Patient/Family Therapy Goals Statement (PT) None stated.   Pertinent History of Current Problem (include personal factors and/or comorbidities that impact the POC) 87 y/o female admitted after a fall, sustaining L displaced femoral neck fracture, now POD # 1 L hip hemiarthroplasty. PMH including hyperlipidemia, HTN, osteopenia, dementia.   Existing Precautions/Restrictions fall;no active hip ABD   Weight-Bearing Status - LLE weight-bearing as tolerated   General Observations Pt in supine upon arrival of therapist.   Cognition   Affect/Mental Status (Cognition) confused   Orientation Status (Cognition) person   Follows Commands (Cognition) 75-90% accuracy   Pain Assessment   Patient Currently in Pain No   Integumentary/Edema   Integumentary/Edema Comments L hip incision covered with dressing.   Posture    Posture Forward head position   Range of Motion (ROM)   ROM Comment Limited L hip ROM d/t hip precautions and pain, otherwise B LEs WFL.   Strength (Manual Muscle Testing)    Strength Comments Not formally assessed, pt demonstrates at least 3/5 grossly in B LEs with functional mobility.   Bed Mobility   Comment, (Bed Mobility) Supine-sit with Tomy.   Transfers   Comment, (Transfers) Sit <> stand with FWW and Tomy.   Gait/Stairs (Locomotion)   Comment, (Gait/Stairs) Pt amb 20' with FWW and CGA.   Balance   Balance Comments Noted good seated and fair standing balance at FWW.   Sensory Examination   Sensory Perception Comments Pt denies numbness/tingling in B LEs.   Clinical Impression   Criteria for Skilled Therapeutic Intervention Yes, treatment indicated   PT Diagnosis (PT) Difficulty with functional mobility.   Influenced by the following impairments Pain, Impaired L hip ROM, Decreased strength, Decreased activity tolerance   Functional limitations due to impairments Limited functional mobility requiring AD and assist.   Clinical Presentation (PT Evaluation Complexity) Stable/Uncomplicated   Clinical Presentation Rationale Based on PMH, current presentation, and social support.   Clinical Decision Making (Complexity) low complexity   Planned Therapy Interventions (PT) balance training;bed mobility training;cryotherapy;gait training;ROM (range of motion);stair training;strengthening;transfer training   Anticipated Equipment Needs at Discharge (PT) walker, rolling  (Defer to TCU, will need FWW if pt discharges home)   Risk & Benefits of therapy have been explained patient   PT Discharge Planning   PT Discharge Recommendation (DC Rec) Transitional Care Facility   PT Rationale for DC Rec Pt is below baseline and would benefit from continued skilled PT intervention at TCU prior to returning home with son. If pt were to discharge home, pt would require assist of 1 for all mobility with use of FWW and Home PT.   PT Brief overview of current status Tomy for bed mobility, Tomy for transfers with FWW, CGA for limited ambulation distance   Plan of Care Review   Plan of Care Reviewed With patient    Total Evaluation Time   Total Evaluation Time (Minutes) 10   Physical Therapy Goals   PT Frequency 5x/week   PT Predicted Duration/Target Date for Goal Attainment 06/11/22   PT Goals Bed Mobility;Transfers;Gait;Stairs   PT: Bed Mobility Supervision/stand-by assist;Supine to/from sit;Within precautions   PT: Transfers Supervision/stand-by assist;Sit to/from stand;Assistive device;Within precautions   PT: Gait Supervision/stand-by assist;Rolling walker;100 feet   PT: Stairs Minimal assist;3 stairs;Rail on left

## 2022-06-06 NOTE — PROGRESS NOTES
Orthopedic Surgery  Khloe Sparks  06/06/2022     Admit Date:  6/4/2022  POD: 1 Day Post-Op   Procedure(s):  LEFT Hip Hemiarthroplasty    Dementia at baseline.  Pleasant.    Patient resting comfortably in chair.    Pain controlled.  Tolerating oral intake.      Temp:  [94.6  F (34.8  C)-98.1  F (36.7  C)] 97.5  F (36.4  C)  Pulse:  [54-67] 66  Resp:  [11-22] 16  BP: (107-160)/(57-79) 160/79  SpO2:  [97 %-100 %] 100 %    Dressing is clean, dry, and intact.   Minimal erythema of the surrounding skin.   Saab in place, small abrasion left knee.   Bilateral calves are soft, non-tender.  Left lower extremity is NVI.  Sensation intact bilateral lower extremities  Patient able to resist dorsi and plantar flexion bilaterally  +Dp pulse    Labs:  Recent Labs   Lab Test 06/06/22  0722 06/04/22  1548 04/15/22  1417 11/26/19  1447   WBC  --  9.0 6.2 4.8   HGB 12.5 13.2 14.3 14.7   PLT  --  205 231 251     No lab results found.  No lab results found.      1. PLAN:   Continue Lovenox in hospital and then ASA at discharge for DVT prophylaxis.     Mobilize with PT/OT, abductor precautions.    WBAT left LE with walker.     Continue current pain regiment.   Dressings: Keep intact.  Change if >60% saturated or peeling off.     2. Disposition   Anticipate d/c to TCU when medically cleared and progressing in PT.    Bethanie Gibson PA-C

## 2022-06-06 NOTE — PROGRESS NOTES
[facility]    Medicine Progress Note - Hospitalist Service    Date of Admission:  6/4/2022    Assessment & Plan        Khloe Sparks is an 86-year-old female with a past medical history significant for hypertension, dyslipidemia, and dementia, who presented to the Emergency Department with a mechanical fall and found to have a left femoral neck fracture.    Left femoral neck fracture due to mechanical fall    Admitted to inpatient.    Orthopedic surgery consulted, appreciate their assistance.    S/p left hip hemiarthroplasty on 6/5/22 by Dr. Dalton.    Post-op cares per orthopedics - DVT prophylaxis, pain medications, etc.    PT/OT consulted.    Care transitions consulted regarding disposition.    Possible UTI  UA suggestive of UTI. UC no growth to date.    Continue antibiotics through tomorrow morning, will stop after tomorrow morning's dose if cultures remain negative.    Hypertension  Blood pressure is adequately controlled.      Continue PTA lisinopril.    Dementia    Continue PTA Aricept.    Dyslipidemia    Resume PTA Lipitor upon discharge.    Glaucoma    Continue PTA Cosopt and Xalatan.       Diet: Advance Diet as Tolerated: Regular Diet Adult    DVT Prophylaxis: Enoxaparin (Lovenox) subcutaneous while in the hospital, then aspirin  Saab Catheter: PRESENT, indication: Retention  Central Lines: None  Cardiac Monitoring: None  Code Status: Full Code      Disposition Plan   Expected Discharge: 06/07/2022     Anticipated discharge location:  Awaiting care coordination huddle  Delays:            The patient's care was discussed with the Bedside Nurse, Care Coordinator/, Patient and Patient's Family.    Aric Guillen MD  Hospitalist Service  [facility]  Securely message with the Vocera Web Console (learn more here)  Text page via [employer] Paging/Directory         Clinically Significant Risk Factors Present on Admission                  ______________________________________________________________________    Interval History   Khloe Sparks was seen this morning. She feels pretty good. No complaints/concerns for me, however dementia limits history. Son was in the room with her, discussed plan of care.    Data reviewed today: I reviewed all medications, new labs and imaging results over the last 24 hours. I personally reviewed no images or EKG's today.    Physical Exam   Vital Signs: Temp: 97.5  F (36.4  C) Temp src: Oral BP: (!) 160/79 Pulse: 66   Resp: 16 SpO2: 100 % O2 Device: None (Room air)    Weight: 130 lbs 0 oz  Constitutional: awake, alert, cooperative, no apparent distress, sitting up in a chair  Respiratory: clear to auscultation bilaterally, no crackles or wheezing  Cardiovascular: regular rate and rhythm, normal S1 and S2, no murmur noted  GI: normal bowel sounds, soft, non-distended, non-tender  Skin: warm, dry  Musculoskeletal: no lower extremity pitting edema present  Neurologic: awake, alert, not oriented, moves all extremities    Data   Recent Labs   Lab 06/06/22  0722 06/05/22  1526 06/04/22  1548   WBC  --   --  9.0   HGB 12.5  --  13.2   MCV  --   --  89   PLT  --   --  205   NA  --   --  138   POTASSIUM  --   --  3.8   CHLORIDE  --   --  105   CO2  --   --  27   BUN  --   --  16   CR  --  0.49* 0.60   ANIONGAP  --   --  6   ERICA  --   --  8.8   GLC  --   --  125*     Medications     lactated ringers 75 mL/hr at 06/05/22 1406       acetaminophen  975 mg Oral Q8H     cefTRIAXone  1 g Intravenous Q24H     donepezil  5 mg Oral At Bedtime     dorzolamide-timolol  1 drop Left Eye BID     enoxaparin ANTICOAGULANT  40 mg Subcutaneous Q24H     ketorolac  15 mg Intravenous Q6H     latanoprost  1 drop Both Eyes At Bedtime     lisinopril  10 mg Oral Daily     polyethylene glycol  17 g Oral Daily     senna-docusate  1 tablet Oral BID     sodium chloride (PF)  3 mL Intracatheter Q8H

## 2022-06-06 NOTE — PLAN OF CARE
Diagnosis: Left hip fx, Left hip toni  POD#:1  Mental Status: A&O x 2, able to answer  questions and follow directionsafter mimicking.   Activity/dangle up with 1  Diet: regular - needs help ordering  Pain: scheduled tyelnol  Cano/Voiding: cano  Tele/Restraints/Iso: NA  02/LDA:PHILLIP  D/C Date: ??  Other Info:

## 2022-06-06 NOTE — PLAN OF CARE
Goal Outcome Evaluation:      Date/Time: 6/6/22 11-07:30 shift     Trauma/Ortho/Medical (Choose one) Ortho     Diagnosis: L hip hemiarthroplasty s/t fall and fracture  POD#: 1  Mental Status: Confuse, restless and agitated this shift. Hospitalist notified and ordered one dose seroquel that made her calm and sleep. Tends to repeat whatever is said to her. Pt fidgety and restless and attempted to get out of bed . Given towels to fold and other safe objects to manipulate and she seems to enjoy this. Mitts available in room in case needed.3rd rail up.  Activity/dangle: WBAT  Diet: Regular  Pain: Unable to rate, seems mild based on vocalization, lack of guarding, facial expression. Has scheduled tylenol and toradol.   Saab/Voiding: Saab was placed due to retention. Immediate return of 625 mL.   Tele/Restraints/Iso: No  02/LDA: PIV R arm with LR running at 75  D/C Date: TBD

## 2022-06-06 NOTE — PROVIDER NOTIFICATION
MD Notification    Notified Person: MD    Notified Person Name: Bernard Jackson    Notification Date/Time: 1:00 AM    Notification Interaction: smart web/ ANcom    Purpose of Notification:Confused , restless and jumping out of bed.  Requesting for PRN to calm her down.     Orders Received:    Comments:

## 2022-06-06 NOTE — PLAN OF CARE
Goal Outcome Evaluation:    Plan of Care Reviewed With: patient, son     Overall Patient Progress: improving      Date/Time: 6/5 3-11:30 shift     Trauma/Ortho/Medical (Choose one) Ortho     Diagnosis: L hip hemiarthroplasty s/t fall and fracture  POD#: 0  Mental Status: Pt mostly aware of situation, can follow direction. Due to speech difficulty, difficult to assess whether oriented to place and situation. Pt tends to repeat whatever is said to her with a forced/pressured pattern of speech. Pt fidgety and restless and attempted to get out of bed and messes with tubes and tape- removed O2 sensors, cano stat lock, IV. Given towels to fold and other safe objects to manipulate and she seems to enjoy this. Ordered mitts and placed in room if needed. 3rd rail up.  Activity/dangle: WBAT  Diet: Regular  Pain: Unable to rate, seems mild based on vocalization, lack of guarding, facial expression. Has scheduled tylenol and toradol.   Cano/Voiding: Cano was placed due to retention. Immediate return of 625 mL.   Tele/Restraints/Iso: No  02/LDA: PIV R arm had a bent catheter which then infiltrated, replaced. Late in shift pt pulled it out and was replaced again. Currently saline locked to help decrease likelihood of pulling it again.   D/C Date: Estimated 2 days

## 2022-06-07 LAB
BACTERIA UR CULT: NORMAL
GLUCOSE BLD-MCNC: 108 MG/DL (ref 70–99)
HGB BLD-MCNC: 12.2 G/DL (ref 11.7–15.7)

## 2022-06-07 PROCEDURE — 250N000011 HC RX IP 250 OP 636: Performed by: ORTHOPAEDIC SURGERY

## 2022-06-07 PROCEDURE — 82947 ASSAY GLUCOSE BLOOD QUANT: CPT | Performed by: HOSPITALIST

## 2022-06-07 PROCEDURE — 250N000013 HC RX MED GY IP 250 OP 250 PS 637: Performed by: ORTHOPAEDIC SURGERY

## 2022-06-07 PROCEDURE — 85018 HEMOGLOBIN: CPT | Performed by: ORTHOPAEDIC SURGERY

## 2022-06-07 PROCEDURE — 99232 SBSQ HOSP IP/OBS MODERATE 35: CPT | Performed by: HOSPITALIST

## 2022-06-07 PROCEDURE — 120N000001 HC R&B MED SURG/OB

## 2022-06-07 PROCEDURE — 36415 COLL VENOUS BLD VENIPUNCTURE: CPT | Performed by: HOSPITALIST

## 2022-06-07 PROCEDURE — 250N000011 HC RX IP 250 OP 636: Performed by: HOSPITALIST

## 2022-06-07 RX ORDER — ACETAMINOPHEN 325 MG/1
650 TABLET ORAL EVERY 4 HOURS PRN
Status: ON HOLD | DISCHARGE
Start: 2022-06-08 | End: 2023-01-17

## 2022-06-07 RX ORDER — POLYETHYLENE GLYCOL 3350 17 G/17G
17 POWDER, FOR SOLUTION ORAL DAILY
DISCHARGE
Start: 2022-06-08 | End: 2022-10-03

## 2022-06-07 RX ORDER — OXYCODONE HYDROCHLORIDE 5 MG/1
2.5 TABLET ORAL EVERY 4 HOURS PRN
Qty: 10 TABLET | Refills: 0 | Status: SHIPPED | OUTPATIENT
Start: 2022-06-07 | End: 2022-06-17

## 2022-06-07 RX ADMIN — CEFTRIAXONE SODIUM 1 G: 1 INJECTION, POWDER, FOR SOLUTION INTRAMUSCULAR; INTRAVENOUS at 08:52

## 2022-06-07 RX ADMIN — LISINOPRIL 10 MG: 10 TABLET ORAL at 08:53

## 2022-06-07 RX ADMIN — ACETAMINOPHEN 975 MG: 325 TABLET ORAL at 08:53

## 2022-06-07 RX ADMIN — ACETAMINOPHEN 975 MG: 325 TABLET ORAL at 01:00

## 2022-06-07 RX ADMIN — DONEPEZIL HYDROCHLORIDE 5 MG: 5 TABLET, FILM COATED ORAL at 21:24

## 2022-06-07 RX ADMIN — DORZOLAMIDE HYDROCHLORIDE AND TIMOLOL MALEATE 1 DROP: 20; 5 SOLUTION/ DROPS OPHTHALMIC at 21:25

## 2022-06-07 RX ADMIN — ACETAMINOPHEN 975 MG: 325 TABLET ORAL at 16:11

## 2022-06-07 RX ADMIN — LATANOPROST 1 DROP: 50 SOLUTION/ DROPS OPHTHALMIC at 21:24

## 2022-06-07 RX ADMIN — ENOXAPARIN SODIUM 40 MG: 40 INJECTION SUBCUTANEOUS at 08:52

## 2022-06-07 RX ADMIN — DORZOLAMIDE HYDROCHLORIDE AND TIMOLOL MALEATE 1 DROP: 20; 5 SOLUTION/ DROPS OPHTHALMIC at 08:58

## 2022-06-07 ASSESSMENT — ACTIVITIES OF DAILY LIVING (ADL)
ADLS_ACUITY_SCORE: 39
ADLS_ACUITY_SCORE: 40
ADLS_ACUITY_SCORE: 39
ADLS_ACUITY_SCORE: 39
ADLS_ACUITY_SCORE: 40
ADLS_ACUITY_SCORE: 39
ADLS_ACUITY_SCORE: 40
ADLS_ACUITY_SCORE: 39
ADLS_ACUITY_SCORE: 40
ADLS_ACUITY_SCORE: 40
ADLS_ACUITY_SCORE: 39
ADLS_ACUITY_SCORE: 39

## 2022-06-07 NOTE — PLAN OF CARE
Diagnosis: Left femoral neck fx, L hip toni   POD#:2  Mental Status: A&O x2, follows commands, yells and mimics   Activity/dangle up with 1  Diet: regular  Pain:tylenol  Saab/Voiding: DTV  Tele/Restraints/Iso: NA  02/LDA: PHILLIP   D/C Date:? TCU  Other Info: loose BM x 2, hold softners

## 2022-06-07 NOTE — PROGRESS NOTES
Orthopedic Surgery  Khloe Sparks  06/07/2022     Admit Date:  6/4/2022  POD: 2 Days Post-Op   Procedure(s):  LEFT Hip Hemiarthroplasty    Dementia at baseline.  Pleasant.    Patient resting comfortably in chair.    Pain controlled.  Tolerating oral intake.      Temp:  [97.8  F (36.6  C)-98.6  F (37  C)] 98.6  F (37  C)  Pulse:  [70-75] 74  Resp:  [16-18] 18  BP: (127-148)/(70-76) 127/70  SpO2:  [96 %-100 %] 96 %    Dressing is clean, dry, and intact.   Minimal erythema of the surrounding skin.   Saab in place, small abrasion left knee.   Bilateral calves are soft, non-tender.  Left lower extremity is NVI.  Sensation intact bilateral lower extremities  Patient able to resist dorsi and plantar flexion bilaterally  +Dp pulse    Labs:  Recent Labs   Lab Test 06/07/22  0739 06/06/22  0722 06/04/22  1548 04/15/22  1417 11/26/19  1447   WBC  --   --  9.0 6.2 4.8   HGB 12.2 12.5 13.2 14.3 14.7   PLT  --   --  205 231 251     No lab results found.  No lab results found.      1. PLAN:   Continue Lovenox in hospital and then ASA at discharge for DVT prophylaxis.     Mobilize with PT/OT, abductor precautions.    WBAT left LE with walker.     Continue current pain regiment.   Dressings: Keep intact.  Change if >60% saturated or peeling off.     2. Disposition   Anticipate d/c to TCU when medically cleared and progressing in PT.    Bethanie Gibson PA-C

## 2022-06-07 NOTE — PROGRESS NOTES
[facility]    Medicine Progress Note - Hospitalist Service    Date of Admission:  6/4/2022    Assessment & Plan        Khloe Sparks is an 86-year-old female with a past medical history significant for hypertension, dyslipidemia, and dementia, who presented to the Emergency Department with a mechanical fall and found to have a left femoral neck fracture.    Left femoral neck fracture due to mechanical fall    Admitted to inpatient.    Orthopedic surgery consulted, appreciate their assistance.    S/p left hip hemiarthroplasty on 6/5/22 by Dr. Dalton.    Post-op cares per orthopedics - DVT prophylaxis, pain medications, etc.    PT/OT consulted.    Care transitions consulted regarding disposition, will need TCU.    UTI ruled out  UA suggestive of UTI. UC no growth to date.    Empirically treated with antibiotics initially.    Urine culture has come back negative, discontinue antibiotics. Received 3 days of antibiotics.    Urinary retention  Saab catheter placed on 6/5/22 due to urinary retention.    Trial of voiding today.    Hypertension  Blood pressure is adequately controlled.      Continue PTA lisinopril.    Dementia    Continue PTA Aricept.    Dyslipidemia    Resume PTA Lipitor upon discharge.    Glaucoma    Continue PTA Cosopt and Xalatan.       Diet: Advance Diet as Tolerated: Regular Diet Adult  Diet    DVT Prophylaxis: Enoxaparin (Lovenox) subcutaneous while in the hospital, then ASA after discharge  Saab Catheter: PRESENT, indication: Retention  Central Lines: None  Cardiac Monitoring: None  Code Status: Full Code      Disposition Plan   Expected Discharge: 06/08/2022, awaiting TCU placement     Anticipated discharge location:  Awaiting care coordination huddle  Delays:            The patient's care was discussed with the Bedside Nurse, Care Coordinator/ and Patient.    Aric Guillen MD  Hospitalist Service  [facility]  Securely  message with the Vocera Web Console (learn more here)  Text page via [facility] Paging/Directory         Clinically Significant Risk Factors Present on Admission                 ______________________________________________________________________    Interval History   Khloe Sparks was seen this morning. Feels OK, denies pain. No chest pain, shortness of breath, nausea, abdominal pain, although history limited by dementia.    Data reviewed today: I reviewed all medications, new labs and imaging results over the last 24 hours. I personally reviewed no images or EKG's today.    Physical Exam   Vital Signs: Temp: 98.6  F (37  C) Temp src: Oral BP: 127/70 Pulse: 74   Resp: 18 SpO2: 96 % O2 Device: None (Room air)    Weight: 130 lbs 0 oz  Constitutional: awake, alert, cooperative, no apparent distress, sitting up in a chair  Respiratory: clear to auscultation bilaterally, no crackles or wheezing  Cardiovascular: regular rate and rhythm, normal S1 and S2, no murmur noted  GI: normal bowel sounds, soft, non-distended, non-tender  : Saab catheter in place  Skin: warm, dry  Musculoskeletal: no lower extremity pitting edema present  Neurologic: awake, alert, not oriented, moves all extremities    Data   Recent Labs   Lab 06/07/22  0739 06/06/22  0722 06/05/22  1526 06/04/22  1548   WBC  --   --   --  9.0   HGB 12.2 12.5  --  13.2   MCV  --   --   --  89   PLT  --   --   --  205   NA  --   --   --  138   POTASSIUM  --   --   --  3.8   CHLORIDE  --   --   --  105   CO2  --   --   --  27   BUN  --   --   --  16   CR  --   --  0.49* 0.60   ANIONGAP  --   --   --  6   ERICA  --   --   --  8.8   *  --   --  125*     Medications       acetaminophen  975 mg Oral Q8H     donepezil  5 mg Oral At Bedtime     dorzolamide-timolol  1 drop Left Eye BID     enoxaparin ANTICOAGULANT  40 mg Subcutaneous Q24H     latanoprost  1 drop Both Eyes At Bedtime     lisinopril  10 mg Oral Daily     polyethylene glycol  17 g Oral Daily      senna-docusate  1 tablet Oral BID     sodium chloride (PF)  3 mL Intracatheter Q8H

## 2022-06-07 NOTE — CONSULTS
Care Management Initial Consult    General Information  Assessment completed with: oscar Blair  Type of CM/SW Visit: Initial Assessment    Primary Care Provider verified and updated as needed: Yes   Readmission within the last 30 days:        Reason for Consult: discharge planning  Advance Care Planning: Advance Care Planning Reviewed:  (no ACP documents)          Communication Assessment  Patient's communication style: spoken language (English or Bilingual)    Hearing Difficulty or Deaf: no   Wear Glasses or Blind: yes    Cognitive  Cognitive/Neuro/Behavioral: .WDL except  Level of Consciousness: confused  Arousal Level: opens eyes spontaneously  Orientation: disoriented to, place, time  Mood/Behavior: calm, cooperative  Best Language: 0 - No aphasia  Speech: hesitant, word-finding difficulty    Living Environment:   People in home: child(henry), adult  son Alen  Current living Arrangements: house      Able to return to prior arrangements: no       Family/Social Support:  Care provided by: child(henry)  Provides care for: no one  Marital Status:   Children          Description of Support System: Supportive, Involved    Support Assessment: Adequate family and caregiver support, Adequate social supports    Current Resources:   Patient receiving home care services: No     Community Resources: None  Equipment currently used at home: none  Supplies currently used at home: None    Employment/Financial:  Employment Status: retired        Financial Concerns: No concerns identified   Referral to Financial Worker: No       Lifestyle & Psychosocial Needs:  Social Determinants of Health     Tobacco Use: Medium Risk     Smoking Tobacco Use: Former Smoker     Smokeless Tobacco Use: Never Used   Alcohol Use: Not on file   Financial Resource Strain: Not on file   Food Insecurity: Not on file   Transportation Needs: Not on file   Physical Activity: Not on file   Stress: Not on file   Social Connections: Not on file    Intimate Partner Violence: Not on file   Depression: Not at risk     PHQ-2 Score: 1   Housing Stability: Not on file       Functional Status:  Prior to admission patient needed assistance:              Mental Health Status:          Chemical Dependency Status:                Values/Beliefs:  Spiritual, Cultural Beliefs, Restorationism Practices, Values that affect care: no               Additional Information:  Per care management/social work consult for discharge planning and TCU placement on discharge.  Patient was admitted on 6-4-22 after a fall resulting in a left femoral neck fracture.   The tentative date of discharge is 6-8-22.  Reviewed chart and spoke with patient's son Alen regarding discharge plans.  Per patient's son's report, patient lives with him in a house with 3 steps to enter.  There is a flight of steps to the bedroom.  Patient is independent at baseline using no assistive devices.  Reviewed the therapy discharge recommendations of TCU placement on discharge and patient's son is in agreement.  Explained that with patient's memory it may be best that we make referrals to memory care TCU's.   Explained where they are located and patient's son is asking for a referral to be sent to [facility].  Patient's son states that his brother has been there in the past.  Referral sent, via discharge on the double, to check bed availability.  Received a call back from [facility].  They have  Bed available for tomorrow.  Updated patient's son and he is in agreement.  He will update me on transportation options in the morning.  Patient will likely benefit from transportation being arranged.  Tentative stretcher transport arranged for 14:00 tomorrow as patient is confused, she needs supervision, and is not safe to be alone in the back of the rig.  Faxed the facesheet and PCS to  [facility].      Will continue to follow.      PAULETTE Roy, [facility]    [phone]    [facility]

## 2022-06-07 NOTE — PLAN OF CARE
Goal Outcome Evaluation:  Up with 1/waker and belt. Confused and pulls at tubes at times- hi under kerlix and under pillows(cano) and patient has left them alone. Po ok. Anticip[ating discharge soon

## 2022-06-07 NOTE — PLAN OF CARE
Alert to self, pleasant calm and cooperative, VSS on room air. Scheduled tylenol for pain, cano intact with adequate output, dressing CDI, will continue to monitor.

## 2022-06-08 VITALS
BODY MASS INDEX: 20.4 KG/M2 | OXYGEN SATURATION: 96 % | HEIGHT: 67 IN | HEART RATE: 83 BPM | DIASTOLIC BLOOD PRESSURE: 74 MMHG | SYSTOLIC BLOOD PRESSURE: 139 MMHG | RESPIRATION RATE: 16 BRPM | WEIGHT: 130 LBS | TEMPERATURE: 98.4 F

## 2022-06-08 LAB
GLUCOSE BLDC GLUCOMTR-MCNC: 115 MG/DL (ref 70–99)
GLUCOSE BLDC GLUCOMTR-MCNC: 119 MG/DL (ref 70–99)
PLATELET # BLD AUTO: 183 10E3/UL (ref 150–450)

## 2022-06-08 PROCEDURE — 85049 AUTOMATED PLATELET COUNT: CPT | Performed by: ORTHOPAEDIC SURGERY

## 2022-06-08 PROCEDURE — 36415 COLL VENOUS BLD VENIPUNCTURE: CPT | Performed by: ORTHOPAEDIC SURGERY

## 2022-06-08 PROCEDURE — 250N000011 HC RX IP 250 OP 636: Performed by: ORTHOPAEDIC SURGERY

## 2022-06-08 PROCEDURE — 99239 HOSP IP/OBS DSCHRG MGMT >30: CPT | Performed by: HOSPITALIST

## 2022-06-08 PROCEDURE — 250N000013 HC RX MED GY IP 250 OP 250 PS 637: Performed by: ORTHOPAEDIC SURGERY

## 2022-06-08 RX ADMIN — ENOXAPARIN SODIUM 40 MG: 40 INJECTION SUBCUTANEOUS at 08:46

## 2022-06-08 RX ADMIN — DORZOLAMIDE HYDROCHLORIDE AND TIMOLOL MALEATE 1 DROP: 20; 5 SOLUTION/ DROPS OPHTHALMIC at 08:53

## 2022-06-08 RX ADMIN — ACETAMINOPHEN 975 MG: 325 TABLET ORAL at 08:46

## 2022-06-08 RX ADMIN — LISINOPRIL 10 MG: 10 TABLET ORAL at 08:46

## 2022-06-08 ASSESSMENT — ACTIVITIES OF DAILY LIVING (ADL)
ADLS_ACUITY_SCORE: 39
ADLS_ACUITY_SCORE: 39
ADLS_ACUITY_SCORE: 41
ADLS_ACUITY_SCORE: 39
ADLS_ACUITY_SCORE: 41

## 2022-06-08 NOTE — PLAN OF CARE
Physical Therapy Discharge Summary    Reason for therapy discharge:    Discharged to transitional care facility.    Progress towards therapy goal(s). See goals on Care Plan in [facility] electronic health record for goal details.  Goals not met.  Barriers to achieving goals:   discharge from facility.    Therapy recommendation(s):    Continued therapy is recommended.  Rationale/Recommendations:  To progress independence and safety with functional mobility.

## 2022-06-08 NOTE — PROGRESS NOTES
PAS-RR    D: Per DHS regulation, SW completed and submitted PAS-RR to [place] [employer] Direct Connect via the Senior LinkAge Line.  PAS-RR confirmation # is : [id number]    I: SW spoke with family and they are aware a PAS-RR has been submitted.  SW reviewed with family that they may be contacted for a follow up appointment within 10 days of hospital discharge if their SNF stay is < 30 days.  Contact information for Senior [employer]Age Line was also provided.    A: family verbalized understanding.    P: Further questions may be directed to [facility] LinkAge Line at #[phone], option #4 for PAS-RR staff.    JASON Armijo

## 2022-06-08 NOTE — DISCHARGE SUMMARY
[facility]  Hospitalist Discharge Summary      Date of Admission:  6/4/2022  Date of Discharge:  6/8/2022  Discharging Provider: Aric Guillen MD  Discharge Service: Hospitalist Service    Discharge Diagnoses   Left femoral neck fracture due to mechanical fall  UTI ruled out  Urinary retention  Hypertension  Alzheimer's dementia  Dyslipidemia  Glaucoma    Follow-ups Needed After Discharge   Follow-up Appointments     Follow Up and recommended labs and tests      Follow up with TCU provider.    Follow up with Dr. Dalton, at [facility] [place], within 2   weeks  to evaluate after surgery. No follow up labs or test are needed.  Call Ally for appointment and questions during normal business hours   [phone]  After hours and on-call [phone]    If emergent need with dressing/splint or operative site you may also be   seen at walk-in clinic open from 8am to 8 pm everyday             Discharge Disposition   Discharged to short-term care facility  Condition at discharge: Stable    Hospital Course   Khloe Sparks is an 86-year-old female with a past medical history significant for hypertension, dyslipidemia, and dementia, who presented to the Emergency Department with a mechanical fall and found to have a left femoral neck fracture.    Left femoral neck fracture due to mechanical fall    Admitted to inpatient.    Orthopedic surgery consulted, appreciate their assistance.    S/p left hip hemiarthroplasty on 6/5/22 by Dr. Dalton.    Post-op cares per orthopedics - DVT prophylaxis, pain medications, etc.    PT/OT consulted.    Care transitions consulted regarding disposition, will need TCU.    Discharge to TCU today.    Follow up with TCU provider and Orthopedics as directed below.    UTI ruled out  UA suggestive of UTI. UC no growth to date.    Empirically treated with antibiotics initially.    Urine culture has come back negative, discontinued antibiotics after AM dose on  6/7/22. Received 3 days of antibiotics.    Urinary retention  Saab catheter placed on 6/5/22 due to urinary retention.    Successful voiding trial on 6/7/22.    Hypertension  Blood pressure is adequately controlled.      Continue PTA lisinopril.    Alzheimer's dementia    Continue PTA Aricept.    Dyslipidemia    Resume PTA Lipitor upon discharge.    Glaucoma    Continue PTA Cosopt and Xalatan.    Consultations This Hospital Stay   CARE MANAGEMENT / SOCIAL WORK IP CONSULT  ORTHOPEDIC SURGERY IP CONSULT  CARE MANAGEMENT / SOCIAL WORK IP CONSULT  PHYSICAL THERAPY ADULT IP CONSULT  OCCUPATIONAL THERAPY ADULT IP CONSULT  PHYSICAL THERAPY ADULT IP CONSULT  OCCUPATIONAL THERAPY ADULT IP CONSULT    Code Status   Full Code    Time Spent on this Encounter   I, Aric Guillen MD, personally saw the patient today and spent greater than 30 minutes discharging this patient.       Aric Guillen MD  [facility]  [address] [place] [id number]  Phone: [phone]  Fax: [phone]  ______________________________________________________________________    Physical Exam   Vital Signs: Temp: 98.4  F (36.9  C) Temp src: Oral BP: 139/74 Pulse: 83   Resp: 16 SpO2: 96 % O2 Device: None (Room air)    Weight: 130 lbs 0 oz  Constitutional: awake, alert, cooperative, no apparent distress, laying in the hospital bed  Respiratory: clear to auscultation bilaterally, no crackles or wheezing  Cardiovascular: regular rate and rhythm, normal S1 and S2, no murmur noted  GI: normal bowel sounds, soft, non-distended, non-tender  Skin: warm, dry  Musculoskeletal: no lower extremity pitting edema present  Neurologic: awake, alert, not oriented, moves all extremities       Primary Care Physician   Gina Jackson    Discharge Orders      General info for SNF    Length of Stay Estimate: Short Term Care: Estimated # of Days <30  Condition at Discharge: Improving  Level of care:skilled   Rehabilitation  Potential: Good  Admission H&P remains valid and up-to-date: Yes  Recent Chemotherapy: N/A  Use Nursing Home Standing Orders: Yes     Mantoux instructions    Give two-step Mantoux (PPD) Per Facility Policy Yes     Reason for your hospital stay    Left hip hemiarthroplasty s/t femoral neck fracture     Weight bearing status    WBAT with walker     Activity - Up with assistive device     Wound care    Site:   Left lateral hip  Instructions:  Leave dressing intact for 2 weeks, call if becomes overly saturated (>50%); do not soak or submerge, ok to shower, reinforce if needed     Follow Up and recommended labs and tests    Follow up with TCU provider.    Follow up with Dr. Dalton, at [facility] [place], within 2 weeks  to evaluate after surgery. No follow up labs or test are needed.  Call Al[facility] for appointment and questions during normal business hours [phone]  After hours and on-call [phone]    If emergent need with dressing/splint or operative site you may also be seen at walk-in clinic open from 8am to 8 pm everyday     Physical Therapy Adult Consult    Evaluate and treat as clinically indicated.    Reason:  Left hip hemiarthroplasty s/t femoral neck fracture     Occupational Therapy Adult Consult    Evaluate and treat as clinically indicated.    Reason:  Left hip hemiarthroplasty s/t femoral neck fracture     Fall precautions     Hip precautions     Crutches DME    DME Documentation: Describe the reason for need to support medical necessity: Impaired gait status post hip surgery. I, the undersigned, certify that the above prescribed supplies are medically necessary for this patient and is both reasonable and necessary in reference to accepted standards of medical practice in the treatment of this patient's condition and is not prescribed as a convenience.     Rafa DME    DME Documentation: Describe the reason for need to support medical necessity: Impaired gait status post hip surgery. I, the  undersigned, certify that the above prescribed supplies are medically necessary for this patient and is both reasonable and necessary in reference to accepted standards of medical practice in the treatment of this patient's condition and is not prescribed as a convenience.     Walker DME    : DME Documentation: Describe the reason for need to support medical necessity: Impaired gait status post hip surgery. I, the undersigned, certify that the above prescribed supplies are medically necessary for this patient and is both reasonable and necessary in reference to accepted standards of medical practice in the treatment of this patient's condition and is not prescribed as a convenience.     Diet    Follow this diet upon discharge: Orders Placed This Encounter      Advance Diet as Tolerated: Regular Diet Adult     Significant Results and Procedures   Most Recent 3 CBC's:Recent Labs   Lab Test 06/08/22  0648 06/07/22  0739 06/06/22  0722 06/04/22  1548 04/15/22  1417 11/26/19  1447   WBC  --   --   --  9.0 6.2 4.8   HGB  --  12.2 12.5 13.2 14.3 14.7   MCV  --   --   --  89 92 89     --   --  205 231 251     Most Recent 3 BMP's:Recent Labs   Lab Test 06/08/22  0603 06/08/22  0203 06/07/22  0739 06/05/22  1526 06/04/22  1548 04/15/22  1417 11/26/19  1447   NA  --   --   --   --  138 141 139   POTASSIUM  --   --   --   --  3.8 4.3 4.8   CHLORIDE  --   --   --   --  105 109 104   CO2  --   --   --   --  27 26 25   BUN  --   --   --   --  16 21 9   CR  --   --   --  0.49* 0.60 0.63 0.73   ANIONGAP  --   --   --   --  6 6 10   ERICA  --   --   --   --  8.8 9.8 9.6   * 119* 108*  --  125* 93 99     Results for orders placed or performed during the hospital encounter of 06/04/22   XR Pelvis w Hip Left 1 View    Narrative    EXAM: XR PELVIS AND HIP LEFT 1 VIEW  LOCATION: [facility]  DATE/TIME: 6/4/2022 4:09 PM    INDICATION: Fall, hip pain, rule out fracture  COMPARISON: None.      Impression     IMPRESSION: There is likely a fracture of the left greater trochanter and base of the femoral neck, however this is suboptimally evaluated due to internal rotation on the frontal view and suboptimal image quality on the lateral view. Recommend CT.    Underlying diffuse bony demineralization. Degenerative changes in the lumbar spine.   CT Hip Left w/o Contrast    Narrative    EXAM: CT HIP LEFT W/O CONTRAST  LOCATION: [facility]  DATE/TIME: 6/4/2022 4:57 PM    INDICATION: Pain after fall.  COMPARISON: None.  TECHNIQUE: Noncontrast. Axial, sagittal and coronal thin-section reconstruction. Dose reduction techniques were used.     FINDINGS:     BONES:  -There is a moderately displaced oblique fracture of the left femoral neck with mild to moderate impaction. No pelvic fracture. No sacral fracture.    SOFT TISSUES:  -Multiple gallstones in the gallbladder.      Impression    IMPRESSION:  1.  There is a moderately displaced oblique fracture of the left femoral neck with mild to moderate impaction.  2.  Multiple gallstones in the gallbladder.     XR Pelvis w Hip Port Left  1 View    Narrative    EXAM: XR PELVIS AND HIP PORTABLE LEFT 1 VIEW  LOCATION: [facility]  DATE/TIME: 6/5/2022 11:36 AM    INDICATION: Status post Hip surgery  COMPARISON: 06/04/2022      Impression    IMPRESSION: Interval left hip arthroplasty. Alignment is satisfactory. No acute periprosthetic fracture. Postoperative gas in the soft tissues.    Diffuse bony demineralization. Degenerative change lower lumbar spine. Cholelithiasis.     Discharge Medications   Current Discharge Medication List      START taking these medications    Details   acetaminophen (TYLENOL) 325 MG tablet Take 2 tablets (650 mg) by mouth every 4 hours as needed for other (For optimal non-opioid multimodal pain management to improve pain control.)    Associated Diagnoses: Left displaced femoral neck fracture (H)      aspirin  (ASA) 81 MG EC tablet Take 1 tablet (81 mg) by mouth daily  Qty: 45 tablet    Associated Diagnoses: Left displaced femoral neck fracture (H)      oxyCODONE (ROXICODONE) 5 MG tablet Take 0.5 tablets (2.5 mg) by mouth every 4 hours as needed for moderate to severe pain  Qty: 10 tablet, Refills: 0    Associated Diagnoses: Left displaced femoral neck fracture (H)      polyethylene glycol (MIRALAX) 17 g packet Take 17 g by mouth daily    Associated Diagnoses: Left displaced femoral neck fracture (H)         CONTINUE these medications which have NOT CHANGED    Details   atorvastatin (LIPITOR) 40 MG tablet Take 0.5 tablets (20 mg) by mouth daily  Qty: 90 tablet, Refills: 3    Associated Diagnoses: Hyperlipidemia LDL goal <100      donepezil (ARICEPT) 5 MG tablet Take 1 tablet (5 mg) by mouth At Bedtime  Qty: 90 tablet, Refills: 3    Associated Diagnoses: Alzheimer's disease (H)      dorzolamide-timolol (COSOPT) 2-0.5 % ophthalmic solution Place 1 drop Into the left eye 2 times daily  Refills: 1      latanoprost (XALATAN) 0.005 % ophthalmic solution instill 1 drop in both eyes at bedtime  Refills: 3      lisinopril (ZESTRIL) 10 MG tablet Take 1 tablet (10 mg) by mouth daily  Qty: 90 tablet, Refills: 3    Associated Diagnoses: Primary hypertension      multivitamin (THERMEMS) TABS Take 1 tablet by mouth      triamcinolone (KENALOG) 0.1 % external cream Apply topically 2 times daily  Qty: 15 g, Refills: 0    Associated Diagnoses: Rash      hydrocortisone (CORTAID) 1 % external cream Apply topically 2 times daily  Qty: 30 g, Refills: 3    Associated Diagnoses: Rash           Allergies   No Known Allergies

## 2022-06-08 NOTE — PLAN OF CARE
Goal Outcome Evaluation:      A&O to self & situation. Up w/ A2 GB & sera steady to BSC. Pt. had 1 large soft to loose BM this shift. Denies pain. CMS intact per baseline. Dressing CDI. Plan to discharge to TCU today. Will cont' to monitor.                  Jaylene

## 2022-06-08 NOTE — PROGRESS NOTES
Care Management Follow Up    Length of Stay (days): 4    Expected Discharge Date: 06/08/2022     Concerns to be Addressed:       Patient plan of care discussed at interdisciplinary rounds: Yes    Anticipated Discharge Disposition:       Anticipated Discharge Services:    Anticipated Discharge DME:      Patient/family educated on Medicare website which has current facility and service quality ratings:    Education Provided on the Discharge Plan:    Patient/Family in Agreement with the Plan:      Referrals Placed by CM/SW:    Private pay costs discussed: Not applicable    Additional Information:  Writer called admissions at [facility] to confirm discharge. Writer call son to discuss transport time and arrangements, son (maggy) asked writer to call him back in an hour to discuss.      Writer spoke to Yohana in admissions who confirmed that they have a bed for today and are agreeable to a 1400 transport time. Confirmed with charge RN that patient should go via stretcher. Writer to discuss with son. Writer asked for orders.       Writer spoke with patient's son and he is agreeable to the stretcher ride and is aware of potential out of pocket cost. Orders faxed to TCU. Writer updated charge RN and HUC. Charge nurse to update bedside nurse. Writer faxed PAS and Script.    PAULETTE Sanchez, LGSW   Social Work   [facility]

## 2022-06-08 NOTE — DISCHARGE SUMMARY
Patient is A&O x2 baseline dementia. Assist of 2 with shruti steady. IV access removed. Denies pain. Dressing CDI. Discharging to walker Sabianist via stretcher at 2:00 pm. Son at bedside. Personal belongings returnes

## 2022-06-08 NOTE — PROGRESS NOTES
[facility]    Medicine Progress Note - Hospitalist Service    Date of Admission:  6/4/2022    Assessment & Plan        Khloe Sparks is an 86-year-old female with a past medical history significant for hypertension, dyslipidemia, and dementia, who presented to the Emergency Department with a mechanical fall and found to have a left femoral neck fracture.    Left femoral neck fracture due to mechanical fall    Admitted to inpatient.    Orthopedic surgery consulted, appreciate their assistance.    S/p left hip hemiarthroplasty on 6/5/22 by Dr. Dalton.    Post-op cares per orthopedics - DVT prophylaxis, pain medications, etc.    PT/OT consulted.    Care transitions consulted regarding disposition, will need TCU.    UTI ruled out  UA suggestive of UTI. UC no growth to date.    Empirically treated with antibiotics initially.    Urine culture has come back negative, discontinued antibiotics after AM dose on 6/7/22. Received 3 days of antibiotics.    Urinary retention  Saab catheter placed on 6/5/22 due to urinary retention.    Successful voiding trial on 6/7/22.    Hypertension  Blood pressure is adequately controlled.      Continue PTA lisinopril.    Dementia    Continue PTA Aricept.    Dyslipidemia    Resume PTA Lipitor upon discharge.    Glaucoma    Continue PTA Cosopt and Xalatan.       Diet: Advance Diet as Tolerated: Regular Diet Adult  Diet    DVT Prophylaxis: Enoxaparin (Lovenox) subcutaneous while in the hospital, then ASA after discharge  Saab Catheter: Not present  Central Lines: None  Cardiac Monitoring: None  Code Status: Full Code      Disposition Plan   Expected Discharge: 06/08/2022, awaiting placement  Anticipated discharge location:  Awaiting care coordination huddle  Delays: { TIP  Update expected discharge date and delays and refresh note (tipsheet)     :[id number]}           The patient's care was discussed with the Bedside Nurse and Patient.    Aric Guillen,  MD  Hospitalist Service  [facility]  Securely message with the [employer] Web Console (learn more here)  Text page via [email or web address] Paging/Directory         Clinically Significant Risk Factors Present on Admission                 ______________________________________________________________________    Interval History   Khloe Sparks was seen this morning. She feels OK. Denies pain, shortness of breath, chest pain, nausea, abdominal pain.    Data reviewed today: I reviewed all medications, new labs and imaging results over the last 24 hours. I personally reviewed no images or EKG's today.    Physical Exam   Vital Signs: Temp: 98.4  F (36.9  C) Temp src: Oral BP: 139/74 Pulse: 83   Resp: 16 SpO2: 96 % O2 Device: None (Room air)    Weight: 130 lbs 0 oz  Constitutional: awake, alert, cooperative, no apparent distress, laying in the hospital bed  Respiratory: clear to auscultation bilaterally, no crackles or wheezing  Cardiovascular: regular rate and rhythm, normal S1 and S2, no murmur noted  GI: normal bowel sounds, soft, non-distended, non-tender  Skin: warm, dry  Musculoskeletal: no lower extremity pitting edema present  Neurologic: awake, alert, not oriented, moves all extremities    Data   Recent Labs   Lab 06/08/22  0648 06/08/22  0603 06/08/22  0203 06/07/22  0739 06/06/22  0722 06/05/22  1526 06/04/22  1548   WBC  --   --   --   --   --   --  9.0   HGB  --   --   --  12.2 12.5  --  13.2   MCV  --   --   --   --   --   --  89     --   --   --   --   --  205   NA  --   --   --   --   --   --  138   POTASSIUM  --   --   --   --   --   --  3.8   CHLORIDE  --   --   --   --   --   --  105   CO2  --   --   --   --   --   --  27   BUN  --   --   --   --   --   --  16   CR  --   --   --   --   --  0.49* 0.60   ANIONGAP  --   --   --   --   --   --  6   ERICA  --   --   --   --   --   --  8.8   GLC  --  115* 119* 108*  --   --  125*     Medications       acetaminophen  975 mg Oral Q8H      donepezil  5 mg Oral At Bedtime     dorzolamide-timolol  1 drop Left Eye BID     enoxaparin ANTICOAGULANT  40 mg Subcutaneous Q24H     latanoprost  1 drop Both Eyes At Bedtime     lisinopril  10 mg Oral Daily     polyethylene glycol  17 g Oral Daily     senna-docusate  1 tablet Oral BID     sodium chloride (PF)  3 mL Intracatheter Q8H

## 2022-06-08 NOTE — PLAN OF CARE
Patient is A&O x2. Assist of 1 with gait belt and walker. Vss on RA. Incontinent of bowel x1, voiding in br. Dressing CDI. IV-SL. Denies pain, schedule tylenol given. Possible discharge tomorrow.

## 2022-06-09 ENCOUNTER — TRANSITIONAL CARE UNIT VISIT (OUTPATIENT)
Dept: GERIATRICS | Facility: CLINIC | Age: 87
End: 2022-06-09
Payer: MEDICARE

## 2022-06-09 VITALS
RESPIRATION RATE: 18 BRPM | BODY MASS INDEX: 19.3 KG/M2 | WEIGHT: 123 LBS | HEIGHT: 67 IN | TEMPERATURE: 97.8 F | SYSTOLIC BLOOD PRESSURE: 156 MMHG | OXYGEN SATURATION: 97 % | HEART RATE: 76 BPM | DIASTOLIC BLOOD PRESSURE: 62 MMHG

## 2022-06-09 DIAGNOSIS — N39.0 URINARY TRACT INFECTION WITHOUT HEMATURIA, SITE UNSPECIFIED: ICD-10-CM

## 2022-06-09 DIAGNOSIS — R21 RASH AND NONSPECIFIC SKIN ERUPTION: ICD-10-CM

## 2022-06-09 DIAGNOSIS — M62.81 GENERALIZED MUSCLE WEAKNESS: ICD-10-CM

## 2022-06-09 DIAGNOSIS — G30.9 ALZHEIMER'S DEMENTIA WITHOUT BEHAVIORAL DISTURBANCE, UNSPECIFIED TIMING OF DEMENTIA ONSET: ICD-10-CM

## 2022-06-09 DIAGNOSIS — S72.002A CLOSED FRACTURE OF NECK OF LEFT FEMUR, INITIAL ENCOUNTER (H): Primary | ICD-10-CM

## 2022-06-09 DIAGNOSIS — F02.80 ALZHEIMER'S DEMENTIA WITHOUT BEHAVIORAL DISTURBANCE, UNSPECIFIED TIMING OF DEMENTIA ONSET: ICD-10-CM
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
## 2022-06-09 DIAGNOSIS — I10 HYPERTENSION, UNSPECIFIED TYPE: ICD-10-CM

## 2022-06-09 DIAGNOSIS — H40.9 GLAUCOMA, UNSPECIFIED GLAUCOMA TYPE, UNSPECIFIED LATERALITY: ICD-10-CM

## 2022-06-09 DIAGNOSIS — R53.81 PHYSICAL DECONDITIONING: ICD-10-CM

## 2022-06-09 DIAGNOSIS — E78.5 HYPERLIPIDEMIA, UNSPECIFIED HYPERLIPIDEMIA TYPE: ICD-10-CM

## 2022-06-09 DIAGNOSIS — R33.9 URINARY RETENTION: ICD-10-CM

## 2022-06-09 PROCEDURE — 99310 SBSQ NF CARE HIGH MDM 45: CPT

## 2022-06-09 NOTE — LETTER
6/9/2022        RE: Khloe Sparks  [address]  [facility] [address]        [facility] GERIATRICS    PRIMARY CARE PROVIDER AND CLINIC:  Gina Jackson MD, [facility] CLINIC [address] [facility] / [facility]  Chief Complaint   Patient presents with     [facility] Medical Record Number:  [id number]  Place of Service where encounter took place:  Critical access hospital (TCU) [[id number]]    HPI:  Khloe Sparks  is a 86 year old  (1935), admitted to the above facility from  [facility]. Hospital stay 6/4/22 through 6/8/22.     Past medical history includes Alzheimer's dementia, hypertension, dyslipidemia presented to the hospital on 6/4 following fall.  Found to have left femoral neck fracture.  Underwent left hip hemiarthroplasty on 6/5 with Dr. Dalton.  UA shows suggestive of UTI though culture came back negative.  Received 3 days of antibiotics.  Hospital course complicated by acute urinary retention now resolved s/p Saab catheter.  Discharged to [facility] TCU on 6/8 for ongoing medical management, rehab, and skilled nursing care.    Seen and examined today for care establishment.  History limited due to underlying cognitive impairment.  Denies pain.  LE WBAT.  Incision covered and intact.  No signs of complications.  Vitals stable.  Denies chest pain or shortness of breath.  Initial rehab screening underway.  Staff has no acute concerns.  Family will be updated regarding today's visit.    CODE STATUS/ADVANCE DIRECTIVES DISCUSSION:  Prior  CPR/Full code   ALLERGIES: No Known Allergies   PAST MEDICAL HISTORY:   Past Medical History:   Diagnosis Date     Dyslipidemia      Hypertension       PAST SURGICAL HISTORY:   has a past surgical history that includes Pr Closed Rx Mid Humerus Fracture; Pr Closed Rx Dist Rad/Ulna Fx,Manipul; REMOVE TONSILS/ADENOIDS,<13 Y/O; and Open reduction internal fixation hip unipolar  (Left, 6/5/2022).  FAMILY HISTORY: family history includes Alzheimer Disease in her sister; Breast Cancer in her sister; Cancer in her brother; Multiple Sclerosis in her mother; Pancreatic Cancer in her father.  SOCIAL HISTORY:   reports that she quit smoking about 46 years ago. She has never used smokeless tobacco. She reports current alcohol use. She reports that she does not use drugs.  Patient's living condition: lives with family, adult son     Post Discharge Medication Reconciliation Status: discharge medications reconciled and changed, per note/orders  Current Outpatient Medications   Medication Sig     acetaminophen (TYLENOL) 325 MG tablet Take 2 tablets (650 mg) by mouth every 4 hours as needed for other (For optimal non-opioid multimodal pain management to improve pain control.)     aspirin (ASA) 81 MG EC tablet Take 1 tablet (81 mg) by mouth daily     atorvastatin (LIPITOR) 40 MG tablet Take 0.5 tablets (20 mg) by mouth daily     donepezil (ARICEPT) 5 MG tablet Take 1 tablet (5 mg) by mouth At Bedtime     dorzolamide-timolol (COSOPT) 2-0.5 % ophthalmic solution Place 1 drop Into the left eye 2 times daily     hydrocortisone (CORTAID) 1 % external cream Apply topically 2 times daily (Patient taking differently: Apply topically 2 times daily as needed)     latanoprost (XALATAN) 0.005 % ophthalmic solution instill 1 drop in both eyes at bedtime     lisinopril (ZESTRIL) 10 MG tablet Take 1 tablet (10 mg) by mouth daily     multivitamin (THERMEMS) TABS Take 1 tablet by mouth     oxyCODONE (ROXICODONE) 5 MG tablet Take 0.5 tablets (2.5 mg) by mouth every 4 hours as needed for moderate to severe pain     polyethylene glycol (MIRALAX) 17 g packet Take 17 g by mouth daily     triamcinolone (KENALOG) 0.1 % external cream Apply topically 2 times daily (Patient taking differently: Apply topically 2 times daily as needed (eczema spots on legs))     No current facility-administered medications for this visit.  "      ROS:  Limited secondary to cognitive impairment but today pt reports no pain.     Vitals:  BP (!) 156/62   Pulse 76   Temp 97.8  F (36.6  C)   Resp 18   Ht 1.702 m (5' 7\")   Wt 55.8 kg (123 lb)   LMP  (LMP Unknown)   SpO2 97%   BMI 19.26 kg/m       EXAM:  GENERAL APPEARANCE: Well groomed, well-nourished, appropriately dressed, forceful speech   HEENT-EARS: No discharge/drainage  HEENT-NECK: No lymphadenopathy  HEENT-EYES: PERRLA positive, no drainage/discharge  HEENT-NOSE/MOUTH/THROAT: No nasal drainage or erythema, mucous membranes moist, no throat erythema   RESPIRATORY: Clear to auscultation, even and unlabored, symmetrical chest wall expansion  CARDIOVASCULAR: RRR, no peripheral edema, S1/S2 normal, pulses positive   GASTROINTESTINAL: Soft, nontender, nondistended, bowel sounds present   MUSCULOSKELETAL: Left hip surgical incision covered and intact, moves all extremities fine   INTEGUMENTARY: Right lower leg lateral aspect rash, no signs of infection   NEUROLOGICAL: Alert to self, memory loss, confusion  PSYCHOLOGICAL: Cooperative, socially appropriate    Lab/Diagnostic data:  Last Comprehensive Metabolic Panel:  Sodium   Date Value Ref Range Status   06/04/2022 138 133 - 144 mmol/L Final     Potassium   Date Value Ref Range Status   06/04/2022 3.8 3.4 - 5.3 mmol/L Final     Chloride   Date Value Ref Range Status   06/04/2022 105 94 - 109 mmol/L Final     Carbon Dioxide (CO2)   Date Value Ref Range Status   06/04/2022 27 20 - 32 mmol/L Final     Anion Gap   Date Value Ref Range Status   06/04/2022 6 3 - 14 mmol/L Final     Glucose   Date Value Ref Range Status   06/07/2022 108 (H) 70 - 99 mg/dL Final     GLUCOSE BY METER POCT   Date Value Ref Range Status   06/08/2022 115 (H) 70 - 99 mg/dL Final     Urea Nitrogen   Date Value Ref Range Status   06/04/2022 16 7 - 30 mg/dL Final     Creatinine   Date Value Ref Range Status   06/05/2022 0.49 (L) 0.52 - 1.04 mg/dL Final     GFR Estimate   Date Value " Ref Range Status   06/05/2022 >90 >60 mL/min/1.73m2 Final     Comment:     Effective December 21, 2021 eGFRcr in adults is calculated using the 2021 CKD-EPI creatinine equation which includes age and gender (Sandrine et al., NEJM, DOI: 10.1056/HJPWvj0638770)   11/26/2019 >60 >60 mL/min/1.73m2 Final     Calcium   Date Value Ref Range Status   06/04/2022 8.8 8.5 - 10.1 mg/dL Final     Bilirubin Total   Date Value Ref Range Status   04/15/2022 0.3 0.2 - 1.3 mg/dL Final     Alkaline Phosphatase   Date Value Ref Range Status   04/15/2022 91 40 - 150 U/L Final     ALT   Date Value Ref Range Status   04/15/2022 28 0 - 50 U/L Final     AST   Date Value Ref Range Status   04/15/2022 18 0 - 45 U/L Final     CBC RESULTS: Recent Labs   Lab Test 06/08/22  0648 06/07/22  0739 06/06/22  0722 06/04/22  1548   WBC  --   --   --  9.0   RBC  --   --   --  4.48   HGB  --  12.2   < > 13.2   HCT  --   --   --  40.0   MCV  --   --   --  89   MCH  --   --   --  29.5   MCHC  --   --   --  33.0   RDW  --   --   --  12.2     --   --  205     ASSESSMENT/PLAN:  (S72.002A) Closed fracture of neck of left femur, initial encounter (H)  (primary encounter diagnosis)  (W19.XXXA) Fall, initial encounter  (M62.81) Generalized muscle weakness  (R53.81) Physical deconditioning  Comment: Left femoral fracture 2/2 mechanical fall , s/p left hip hemiarthroplasty on 6/5, incision covered and intact, no pain  Plan: Low-dose oxycodone and Tylenol for pain-wean off oxycodone as able, continue aspirin for DVT prophylaxis, OT/PT, Ortho follow-up in 2 weeks, maximize bowel regimen, fall precautions, monitor history    (N39.0) Urinary tract infection without hematuria, site unspecified  (R33.9) Urinary retention  Comment: Antibiotics x3 days for suggestive UTI, UC negative, s/p Saab catheter for retention, voiding freely now  Plan: PVR as needed, monitor    (I10) Hypertension, unspecified type  (E78.5) Hyperlipidemia, unspecified hyperlipidemia type  Comment:  SBP 140s-160s  Plan: Continue lisinopril and atorvastatin, labs periodically, monitor BP    (G30.9,  F02.80) Alzheimer's dementia without behavioral disturbance, unspecified timing of dementia onset (H)  Comment: Chronic  Plan: Continue Aricept, assist with ADLs as indicated, OT/PT, monitor for changes in mood, behavior, functional status    (H40.9) Glaucoma,unspecified glaucoma type, unspecified laterality   Comment: Chronic  Plan: Continue Dorzolamide HCl-Timolol Mal PF Solution 2-0.5 % and Latanoprost Solution 0.005 %, monitor for changes in vision    (R21) Rash and nonspecific skin eruption  Comment: Right lower leg rash no open skin, no indications of infection  Plan: Continue Triamcinolone Acetonide Cream 0.1 %, monitor rash, PCP follow-up       Electronically signed by:  Karsten Soni DNP,RAVINDRA,AGNP-BC.           The above note was completed in part using Dragon voice recognition software. Although reviewed after completion, some word and grammatical errors may occur. Please contact the author of this note with any questions.                       Sincerely,        RAVINDRA Anaya CNP

## 2022-06-09 NOTE — PROGRESS NOTES
[facility] GERIATRICS    PRIMARY CARE PROVIDER AND CLINIC:  Gina Jackson MD, [facility] [address] [facility] / [facility]  Chief Complaint   Patient presents with     [facility] Medical Record Number:  [id number]  Place of Service where encounter took place:  [facility] ([facility]) [[id number]]    HPI:  Khloe Sparks  is a 86 year old  (1935), admitted to the above facility from  [facility]. Hospital stay 6/4/22 through 6/8/22.     Past medical history includes Alzheimer's dementia, hypertension, dyslipidemia presented to the hospital on 6/4 following fall.  Found to have left femoral neck fracture.  Underwent left hip hemiarthroplasty on 6/5 with Dr. aDlton.  UA shows suggestive of UTI though culture came back negative.  Received 3 days of antibiotics.  Hospital course complicated by acute urinary retention now resolved s/p Saab catheter.  Discharged to [facility] TCU on 6/8 for ongoing medical management, rehab, and skilled nursing care.    Seen and examined today for care establishment.  History limited due to underlying cognitive impairment.  Denies pain.  LE WBAT.  Incision covered and intact.  No signs of complications.  Vitals stable.  Denies chest pain or shortness of breath.  Initial rehab screening underway.  Staff has no acute concerns.  Family will be updated regarding today's visit.    CODE STATUS/ADVANCE DIRECTIVES DISCUSSION:  Prior  CPR/Full code   ALLERGIES: No Known Allergies   PAST MEDICAL HISTORY:   Past Medical History:   Diagnosis Date     Dyslipidemia      Hypertension       PAST SURGICAL HISTORY:   has a past surgical history that includes Pr Closed Rx Mid Humerus Fracture; Pr Closed Rx Dist Rad/Ulna Fx,Manipul; REMOVE TONSILS/ADENOIDS,<13 Y/O; and Open reduction internal fixation hip unipolar (Left, 6/5/2022).  FAMILY HISTORY: family history includes Alzheimer Disease in her sister;  Breast Cancer in her sister; Cancer in her brother; Multiple Sclerosis in her mother; Pancreatic Cancer in her father.  SOCIAL HISTORY:   reports that she quit smoking about 46 years ago. She has never used smokeless tobacco. She reports current alcohol use. She reports that she does not use drugs.  Patient's living condition: lives with family, adult son     Post Discharge Medication Reconciliation Status: discharge medications reconciled and changed, per note/orders  Current Outpatient Medications   Medication Sig     acetaminophen (TYLENOL) 325 MG tablet Take 2 tablets (650 mg) by mouth every 4 hours as needed for other (For optimal non-opioid multimodal pain management to improve pain control.)     aspirin (ASA) 81 MG EC tablet Take 1 tablet (81 mg) by mouth daily     atorvastatin (LIPITOR) 40 MG tablet Take 0.5 tablets (20 mg) by mouth daily     donepezil (ARICEPT) 5 MG tablet Take 1 tablet (5 mg) by mouth At Bedtime     dorzolamide-timolol (COSOPT) 2-0.5 % ophthalmic solution Place 1 drop Into the left eye 2 times daily     hydrocortisone (CORTAID) 1 % external cream Apply topically 2 times daily (Patient taking differently: Apply topically 2 times daily as needed)     latanoprost (XALATAN) 0.005 % ophthalmic solution instill 1 drop in both eyes at bedtime     lisinopril (ZESTRIL) 10 MG tablet Take 1 tablet (10 mg) by mouth daily     multivitamin (THERMEMS) TABS Take 1 tablet by mouth     oxyCODONE (ROXICODONE) 5 MG tablet Take 0.5 tablets (2.5 mg) by mouth every 4 hours as needed for moderate to severe pain     polyethylene glycol (MIRALAX) 17 g packet Take 17 g by mouth daily     triamcinolone (KENALOG) 0.1 % external cream Apply topically 2 times daily (Patient taking differently: Apply topically 2 times daily as needed (eczema spots on legs))     No current facility-administered medications for this visit.       ROS:  Limited secondary to cognitive impairment but today pt reports no pain.     Vitals:  BP  "(!) 156/62   Pulse 76   Temp 97.8  F (36.6  C)   Resp 18   Ht 1.702 m (5' 7\")   Wt 55.8 kg (123 lb)   LMP  (LMP Unknown)   SpO2 97%   BMI 19.26 kg/m       EXAM:  GENERAL APPEARANCE: Well groomed, well-nourished, appropriately dressed, forceful speech   HEENT-EARS: No discharge/drainage  HEENT-NECK: No lymphadenopathy  HEENT-EYES: PERRLA positive, no drainage/discharge  HEENT-NOSE/MOUTH/THROAT: No nasal drainage or erythema, mucous membranes moist, no throat erythema   RESPIRATORY: Clear to auscultation, even and unlabored, symmetrical chest wall expansion  CARDIOVASCULAR: RRR, no peripheral edema, S1/S2 normal, pulses positive   GASTROINTESTINAL: Soft, nontender, nondistended, bowel sounds present   MUSCULOSKELETAL: Left hip surgical incision covered and intact, moves all extremities fine   INTEGUMENTARY: Right lower leg lateral aspect rash, no signs of infection   NEUROLOGICAL: Alert to self, memory loss, confusion  PSYCHOLOGICAL: Cooperative, socially appropriate    Lab/Diagnostic data:  Last Comprehensive Metabolic Panel:  Sodium   Date Value Ref Range Status   06/04/2022 138 133 - 144 mmol/L Final     Potassium   Date Value Ref Range Status   06/04/2022 3.8 3.4 - 5.3 mmol/L Final     Chloride   Date Value Ref Range Status   06/04/2022 105 94 - 109 mmol/L Final     Carbon Dioxide (CO2)   Date Value Ref Range Status   06/04/2022 27 20 - 32 mmol/L Final     Anion Gap   Date Value Ref Range Status   06/04/2022 6 3 - 14 mmol/L Final     Glucose   Date Value Ref Range Status   06/07/2022 108 (H) 70 - 99 mg/dL Final     GLUCOSE BY METER POCT   Date Value Ref Range Status   06/08/2022 115 (H) 70 - 99 mg/dL Final     Urea Nitrogen   Date Value Ref Range Status   06/04/2022 16 7 - 30 mg/dL Final     Creatinine   Date Value Ref Range Status   06/05/2022 0.49 (L) 0.52 - 1.04 mg/dL Final     GFR Estimate   Date Value Ref Range Status   06/05/2022 >90 >60 mL/min/1.73m2 Final     Comment:     Effective December 21, " 2021 eGFRcr in adults is calculated using the 2021 CKD-EPI creatinine equation which includes age and gender (Sandrine et al., NE, DOI: 10.1056/GBSVtd2150286)   11/26/2019 >60 >60 mL/min/1.73m2 Final     Calcium   Date Value Ref Range Status   06/04/2022 8.8 8.5 - 10.1 mg/dL Final     Bilirubin Total   Date Value Ref Range Status   04/15/2022 0.3 0.2 - 1.3 mg/dL Final     Alkaline Phosphatase   Date Value Ref Range Status   04/15/2022 91 40 - 150 U/L Final     ALT   Date Value Ref Range Status   04/15/2022 28 0 - 50 U/L Final     AST   Date Value Ref Range Status   04/15/2022 18 0 - 45 U/L Final     CBC RESULTS: Recent Labs   Lab Test 06/08/22  0648 06/07/22  0739 06/06/22  0722 06/04/22  1548   WBC  --   --   --  9.0   RBC  --   --   --  4.48   HGB  --  12.2   < > 13.2   HCT  --   --   --  40.0   MCV  --   --   --  89   MCH  --   --   --  29.5   MCHC  --   --   --  33.0   RDW  --   --   --  12.2     --   --  205     ASSESSMENT/PLAN:  (S72.002A) Closed fracture of neck of left femur, initial encounter (H)  (primary encounter diagnosis)  (W19.XXXA) Fall, initial encounter  (M62.81) Generalized muscle weakness  (R53.81) Physical deconditioning  Comment: Left femoral fracture 2/2 mechanical fall , s/p left hip hemiarthroplasty on 6/5, incision covered and intact, no pain  Plan: Low-dose oxycodone and Tylenol for pain-wean off oxycodone as able, continue aspirin for DVT prophylaxis, OT/PT, Ortho follow-up in 2 weeks, maximize bowel regimen, fall precautions, monitor history    (N39.0) Urinary tract infection without hematuria, site unspecified  (R33.9) Urinary retention  Comment: Antibiotics x3 days for suggestive UTI, UC negative, s/p Saab catheter for retention, voiding freely now  Plan: PVR as needed, monitor    (I10) Hypertension, unspecified type  (E78.5) Hyperlipidemia, unspecified hyperlipidemia type  Comment: SBP 140s-160s  Plan: Continue lisinopril and atorvastatin, labs periodically, monitor  BP    (G30.9,  F02.80) Alzheimer's dementia without behavioral disturbance, unspecified timing of dementia onset (H)  Comment: Chronic  Plan: Continue Aricept, assist with ADLs as indicated, OT/PT, monitor for changes in mood, behavior, functional status    (H40.9) Glaucoma,unspecified glaucoma type, unspecified laterality   Comment: Chronic  Plan: Continue Dorzolamide HCl-Timolol Mal PF Solution 2-0.5 % and Latanoprost Solution 0.005 %, monitor for changes in vision    (R21) Rash and nonspecific skin eruption  Comment: Right lower leg rash no open skin, no indications of infection  Plan: Continue Triamcinolone Acetonide Cream 0.1 %, monitor rash, PCP follow-up       Electronically signed by:  Karsten Soni,JEEVAN,APRN,AGNP-BC.           The above note was completed in part using Dragon voice recognition software. Although reviewed after completion, some word and grammatical errors may occur. Please contact the author of this note with any questions.

## 2022-06-12 RX ORDER — SENNOSIDES 8.6 MG
1 TABLET ORAL DAILY
Qty: 15 TABLET | Refills: 0 | Status: ON HOLD
Start: 2022-06-12 | End: 2023-01-16

## 2022-06-16 ENCOUNTER — TRANSITIONAL CARE UNIT VISIT (OUTPATIENT)
Dept: GERIATRICS | Facility: CLINIC | Age: 87
End: 2022-06-16
Payer: MEDICARE

## 2022-06-16 VITALS
HEART RATE: 86 BPM | HEIGHT: 67 IN | TEMPERATURE: 98.1 F | BODY MASS INDEX: 19.32 KG/M2 | WEIGHT: 123.1 LBS | OXYGEN SATURATION: 96 % | RESPIRATION RATE: 18 BRPM | SYSTOLIC BLOOD PRESSURE: 138 MMHG | DIASTOLIC BLOOD PRESSURE: 66 MMHG
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
## 2022-06-16 DIAGNOSIS — R53.81 PHYSICAL DECONDITIONING: ICD-10-CM

## 2022-06-16 DIAGNOSIS — R33.9 URINARY RETENTION: ICD-10-CM

## 2022-06-16 DIAGNOSIS — N39.0 URINARY TRACT INFECTION WITHOUT HEMATURIA, SITE UNSPECIFIED: ICD-10-CM

## 2022-06-16 DIAGNOSIS — M62.81 GENERALIZED MUSCLE WEAKNESS: ICD-10-CM

## 2022-06-16 PROCEDURE — 99309 SBSQ NF CARE MODERATE MDM 30: CPT

## 2022-06-16 NOTE — PROGRESS NOTES
"[facility] GERIATRICS    Chief Complaint   Patient presents with     RECHECK     HPI:  Khloe Sparks is a 86 year old  (1935), who is being seen today for an episodic care visit at: [facility] ([facility]) [[id number]]. Today's concern is: Routine TCU follow-up visit    Patient admitted to TCU on 6/8 following hospital stay for fall.  Found to have left femoral neck fracture.  Underwent left hip hemiarthroplasty on 6/5.  UA showed suggestive of UTI though culture came back negative.  Received 3 days of antibiotics in the hospital.  Hospital course complicated by acute urinary retention now resolved s/p Saab catheter.     Being seen today for routine TCU follow-up visit.  ROS limited due to underlying cognitive impairment.  No acute distress.  No changes in condition since last visit.  Left lower extremity WBAT.  Surgical incision covered and intact.  Hemodynamically remained stable.  No reports of chest pain or shortness of breath.  Working with therapy.  No reports of falls or injuries since TCU admission.  Staff has no acute concerns.  Family will be updated.    Allergies, and PMH/PSH reviewed in [employer] today.  REVIEW OF SYSTEMS:  Limited secondary to cognitive impairment but today pt reports no pain.      Objective:   /66   Pulse 86   Temp 98.1  F (36.7  C)   Resp 18   Ht 1.702 m (5' 7\")   Wt 55.8 kg (123 lb 1.6 oz)   LMP  (LMP Unknown)   SpO2 96%   BMI 19.28 kg/m       Exam:  GENERAL APPEARANCE: Well groomed, well-nourished, appropriately dressed, forceful speech    RESPIRATORY: Clear to auscultation, even and unlabored, symmetrical chest wall expansion  CARDIOVASCULAR: RRR, no peripheral edema, S1/S2 normal, pulses positive   GASTROINTESTINAL: Soft, nontender, nondistended, bowel sounds present   MUSCULOSKELETAL: Left hip surgical incision covered and intact, moves all extremities fine   INTEGUMENTARY: Right lower leg lateral aspect rash, no signs of infection   NEUROLOGICAL: " Alert to self, memory loss, confusion  PSYCHOLOGICAL: Cooperative, calm     Recent labs in EPIC reviewed by me today.     Assessment/Plan:  W19.XXXD) Fall, subsequent encounter  (M62.81) Generalized muscle weakness  (R53.81) Physical deconditioning  Comment: Left femoral fracture 2/2 mechanical fall , s/p left hip hemiarthroplasty on 6/5, incision covered and intact, no pain,   Plan:  Discontinue oxycodone since not using, continue Tylenol, consider lidocaine patch, continue aspirin for DVT prophylaxis, continue OT/PT, Ortho follow-up as scheduled, maximize bowel regimen, fall precautions, monitor pain     (N39.0) Urinary tract infection without hematuria, site unspecified  (R33.9) Urinary retention  Comment:  Stable, voiding freely s/p Saab catheter, treated in the hospital with antibiotics x3 days for suggestive UTI, no  symptoms today  Plan: PVR as needed, monitor for UTI and retention       Orders:  DC oxycodone    Electronically signed by:   Karsten Soni,JEEVAN,APRN,AGNP-BC.         The above note was completed in part using Dragon voice recognition software. Although reviewed after completion, some word and grammatical errors may occur. Please contact the author of this note with any questions.

## 2022-06-16 NOTE — LETTER
"    6/16/2022        RE: Khloe Sparks  [address] [facility] [address]        [facility] GERIATRICS    Chief Complaint   Patient presents with     RECHECK     HPI:  Khloe Sparks is a 86 year old  (1935), who is being seen today for an episodic care visit at: [facility] ([facility]) [[id number]]. Today's concern is: Routine TCU follow-up visit    Patient admitted to TCU on 6/8 following hospital stay for fall.  Found to have left femoral neck fracture.  Underwent left hip hemiarthroplasty on 6/5.  UA showed suggestive of UTI though culture came back negative.  Received 3 days of antibiotics in the hospital.  Hospital course complicated by acute urinary retention now resolved s/p Saab catheter.     Being seen today for routine TCU follow-up visit.  ROS limited due to underlying cognitive impairment.  No acute distress.  No changes in condition since last visit.  Left lower extremity WBAT.  Surgical incision covered and intact.  Hemodynamically remained stable.  No reports of chest pain or shortness of breath.  Working with therapy.  No reports of falls or injuries since TCU admission.  Staff has no acute concerns.  Family will be updated.    Allergies, and PMH/PSH reviewed in [employer] today.  REVIEW OF SYSTEMS:  Limited secondary to cognitive impairment but today pt reports no pain.      Objective:   /66   Pulse 86   Temp 98.1  F (36.7  C)   Resp 18   Ht 1.702 m (5' 7\")   Wt 55.8 kg (123 lb 1.6 oz)   LMP  (LMP Unknown)   SpO2 96%   BMI 19.28 kg/m       Exam:  GENERAL APPEARANCE: Well groomed, well-nourished, appropriately dressed, forceful speech    RESPIRATORY: Clear to auscultation, even and unlabored, symmetrical chest wall expansion  CARDIOVASCULAR: RRR, no peripheral edema, S1/S2 normal, pulses positive   GASTROINTESTINAL: Soft, nontender, nondistended, bowel sounds present   MUSCULOSKELETAL: Left hip surgical incision covered and intact, moves all extremities " fine   INTEGUMENTARY: Right lower leg lateral aspect rash, no signs of infection   NEUROLOGICAL: Alert to self, memory loss, confusion  PSYCHOLOGICAL: Cooperative, calm     Recent labs in EPIC reviewed by me today.     Assessment/Plan:  W19.XXXD) Fall, subsequent encounter  (M62.81) Generalized muscle weakness  (R53.81) Physical deconditioning  Comment: Left femoral fracture 2/2 mechanical fall , s/p left hip hemiarthroplasty on 6/5, incision covered and intact, no pain,   Plan:  Discontinue oxycodone since not using, continue Tylenol, consider lidocaine patch, continue aspirin for DVT prophylaxis, continue OT/PT, Ortho follow-up as scheduled, maximize bowel regimen, fall precautions, monitor pain     (N39.0) Urinary tract infection without hematuria, site unspecified  (R33.9) Urinary retention  Comment:  Stable, voiding freely s/p Saab catheter, treated in the hospital with antibiotics x3 days for suggestive UTI, no  symptoms today  Plan: PVR as needed, monitor for UTI and retention       Orders:  DC oxycodone    Electronically signed by:   Karsten Soni DNP,RAVINDRA,AGNP-BC.         The above note was completed in part using Dragon voice recognition software. Although reviewed after completion, some word and grammatical errors may occur. Please contact the author of this note with any questions.               Sincerely,        RAVINDRA Anaya CNP

## 2022-06-23 ENCOUNTER — TRANSITIONAL CARE UNIT VISIT (OUTPATIENT)
Dept: GERIATRICS | Facility: CLINIC | Age: 87
End: 2022-06-23
Payer: MEDICARE

## 2022-06-23 VITALS
HEIGHT: 67 IN | TEMPERATURE: 97.9 F | SYSTOLIC BLOOD PRESSURE: 104 MMHG | BODY MASS INDEX: 19.81 KG/M2 | DIASTOLIC BLOOD PRESSURE: 57 MMHG | HEART RATE: 75 BPM | WEIGHT: 126.2 LBS | OXYGEN SATURATION: 94 % | RESPIRATION RATE: 18 BRPM

## 2022-06-23 DIAGNOSIS — S72.002D CLOSED FRACTURE OF NECK OF LEFT FEMUR WITH ROUTINE HEALING, SUBSEQUENT ENCOUNTER: Primary | ICD-10-CM

## 2022-06-23 DIAGNOSIS — M62.81 GENERALIZED MUSCLE WEAKNESS: ICD-10-CM
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
## 2022-06-23 DIAGNOSIS — R53.81 PHYSICAL DECONDITIONING: ICD-10-CM

## 2022-06-23 DIAGNOSIS — N39.0 URINARY TRACT INFECTION WITHOUT HEMATURIA, SITE UNSPECIFIED: ICD-10-CM

## 2022-06-23 DIAGNOSIS — R33.9 URINARY RETENTION: ICD-10-CM

## 2022-06-23 PROCEDURE — 99308 SBSQ NF CARE LOW MDM 20: CPT

## 2022-06-23 NOTE — LETTER
"    6/23/2022        RE: Khloe Sparks  [address] [facility] [address]        [facility] GERIATRICS    Chief Complaint   Patient presents with     RECHECK     HPI:  Khloe Sparks is a 86 year old  (1935), who is being seen today for an episodic care visit at: [facility] ([facility]) [[id number]]. Today's concern is: None    Patient admitted to TCU on 6/8 following hospital stay for fall.  Found to have left femoral neck fracture.  Underwent left hip hemiarthroplasty on 6/5.  UA showed suggestive of UTI though culture came back negative.  Received 3 days of antibiotics in the hospital.  Hospital course complicated by acute urinary retention now resolved s/p Saab catheter.      Being seen today for routine TCU follow-up visit.  ROS limited due to underlying cognitive impairment. No changes in condition since my last visit. Left lower extremity WBAT.  Surgical incision intact. Reports she is fine. Hemodynamically stable.  No reports of chest pain or shortness of breath. Patient ambulating with therapy. No reports of falls or injuries since TCU admission.  Staff has no acute concerns today.    Allergies, and PMH/PSH reviewed in [employer] today.  REVIEW OF SYSTEMS:  Limited secondary to cognitive impairment but today pt reports fine.     Objective:   /57   Pulse 75   Temp 97.9  F (36.6  C)   Resp 18   Ht 1.702 m (5' 7\")   Wt 57.2 kg (126 lb 3.2 oz)   LMP  (LMP Unknown)   SpO2 94%   BMI 19.77 kg/m       Exam:  GENERAL APPEARANCE: NAD, well groomed, well-nourished, appropriately dressed, forceful speech    RESPIRATORY: Clear to auscultation, even and unlabored, symmetrical chest wall expansion  CARDIOVASCULAR: RRR, no peripheral edema, S1/S2 normal, pulses positive   GASTROINTESTINAL: Soft, nontender, nondistended, bowel sounds present   MUSCULOSKELETAL: Left hip surgical incision intact, no complications, moves all extremities  INTEGUMENTARY: Right lower leg lateral aspect " rash stable, no signs of infection   NEUROLOGICAL: Alert to self, memory loss, confusion  PSYCHOLOGICAL: Cooperative, calm    Labs:  Last Comprehensive Metabolic Panel:  Sodium   Date Value Ref Range Status   06/04/2022 138 133 - 144 mmol/L Final     Potassium   Date Value Ref Range Status   06/04/2022 3.8 3.4 - 5.3 mmol/L Final     Chloride   Date Value Ref Range Status   06/04/2022 105 94 - 109 mmol/L Final     Carbon Dioxide (CO2)   Date Value Ref Range Status   06/04/2022 27 20 - 32 mmol/L Final     Anion Gap   Date Value Ref Range Status   06/04/2022 6 3 - 14 mmol/L Final     Glucose   Date Value Ref Range Status   06/07/2022 108 (H) 70 - 99 mg/dL Final     GLUCOSE BY METER POCT   Date Value Ref Range Status   06/08/2022 115 (H) 70 - 99 mg/dL Final     Urea Nitrogen   Date Value Ref Range Status   06/04/2022 16 7 - 30 mg/dL Final     Creatinine   Date Value Ref Range Status   06/05/2022 0.49 (L) 0.52 - 1.04 mg/dL Final     GFR Estimate   Date Value Ref Range Status   06/05/2022 >90 >60 mL/min/1.73m2 Final     Comment:     Effective December 21, 2021 eGFRcr in adults is calculated using the 2021 CKD-EPI creatinine equation which includes age and gender (Sandrine pelaez al., NEJM, DOI: 10.1056/JEVFlb3903052)   11/26/2019 >60 >60 mL/min/1.73m2 Final     Calcium   Date Value Ref Range Status   06/04/2022 8.8 8.5 - 10.1 mg/dL Final     Lab Results   Component Value Date    WBC 9.0 06/04/2022     Lab Results   Component Value Date    RBC 4.48 06/04/2022     Lab Results   Component Value Date    HGB 12.2 06/07/2022     Lab Results   Component Value Date    HCT 40.0 06/04/2022     Lab Results   Component Value Date    MCV 89 06/04/2022     Lab Results   Component Value Date    MCH 29.5 06/04/2022     Lab Results   Component Value Date    MCHC 33.0 06/04/2022     Lab Results   Component Value Date    RDW 12.2 06/04/2022     Lab Results   Component Value Date     06/08/2022     Assessment/Plan:  (S72.002D) Closed  fracture of neck of left femur with routine healing, subsequent encounter  W19.XXXD) Fall, subsequent encounter  (M62.81) Generalized muscle weakness  (R53.81) Physical deconditioning  Comment: Left femoral fracture 2/2 fall , s/p left hip hemiarthroplasty on 6/5, incision intact, no pain  Plan: Continue Tylenol, continue aspirin for DVT prophylaxis, continue OT/PT, Ortho follow-up as scheduled, maximize bowel regimen, fall precautions, monitor pain     (N39.0) Urinary tract infection without hematuria, site unspecified  (R33.9) Urinary retention  Comment:  Stable, voiding freely s/p Saab catheter, treated in the hospital with antibiotics x3 days for suggestive UTI, no  symptoms today  Plan: PVR as needed, monitor for UTI and retention    Electronically signed by:    Karsten Soni,JEEVAN,APRN,AGNP-BC.               Sincerely,        RAVINDRA Anaya CNP

## 2022-06-23 NOTE — PROGRESS NOTES
"[facility] GERIATRICS    Chief Complaint   Patient presents with     RECHECK     HPI:  Khloe Sparks is a 86 year old  (1935), who is being seen today for an episodic care visit at: [facility] (U) [[id number]]. Today's concern is: None    Patient admitted to TCU on 6/8 following hospital stay for fall.  Found to have left femoral neck fracture.  Underwent left hip hemiarthroplasty on 6/5.  UA showed suggestive of UTI though culture came back negative.  Received 3 days of antibiotics in the hospital.  Hospital course complicated by acute urinary retention now resolved s/p Saab catheter.      Being seen today for routine TCU follow-up visit.  ROS limited due to underlying cognitive impairment. No changes in condition since my last visit. Left lower extremity WBAT.  Surgical incision intact. Reports she is fine. Hemodynamically stable.  No reports of chest pain or shortness of breath. Patient ambulating with therapy. No reports of falls or injuries since TCU admission.  Staff has no acute concerns today.    Allergies, and PMH/PSH reviewed in EPIC today.  REVIEW OF SYSTEMS:  Limited secondary to cognitive impairment but today pt reports fine.     Objective:   /57   Pulse 75   Temp 97.9  F (36.6  C)   Resp 18   Ht 1.702 m (5' 7\")   Wt 57.2 kg (126 lb 3.2 oz)   LMP  (LMP Unknown)   SpO2 94%   BMI 19.77 kg/m       Exam:  GENERAL APPEARANCE: NAD, well groomed, well-nourished, appropriately dressed, forceful speech    RESPIRATORY: Clear to auscultation, even and unlabored, symmetrical chest wall expansion  CARDIOVASCULAR: RRR, no peripheral edema, S1/S2 normal, pulses positive   GASTROINTESTINAL: Soft, nontender, nondistended, bowel sounds present   MUSCULOSKELETAL: Left hip surgical incision intact, no complications, moves all extremities  INTEGUMENTARY: Right lower leg lateral aspect rash stable, no signs of infection   NEUROLOGICAL: Alert to self, memory loss, " confusion  PSYCHOLOGICAL: Cooperative, calm    Labs:  Last Comprehensive Metabolic Panel:  Sodium   Date Value Ref Range Status   06/04/2022 138 133 - 144 mmol/L Final     Potassium   Date Value Ref Range Status   06/04/2022 3.8 3.4 - 5.3 mmol/L Final     Chloride   Date Value Ref Range Status   06/04/2022 105 94 - 109 mmol/L Final     Carbon Dioxide (CO2)   Date Value Ref Range Status   06/04/2022 27 20 - 32 mmol/L Final     Anion Gap   Date Value Ref Range Status   06/04/2022 6 3 - 14 mmol/L Final     Glucose   Date Value Ref Range Status   06/07/2022 108 (H) 70 - 99 mg/dL Final     GLUCOSE BY METER POCT   Date Value Ref Range Status   06/08/2022 115 (H) 70 - 99 mg/dL Final     Urea Nitrogen   Date Value Ref Range Status   06/04/2022 16 7 - 30 mg/dL Final     Creatinine   Date Value Ref Range Status   06/05/2022 0.49 (L) 0.52 - 1.04 mg/dL Final     GFR Estimate   Date Value Ref Range Status   06/05/2022 >90 >60 mL/min/1.73m2 Final     Comment:     Effective December 21, 2021 eGFRcr in adults is calculated using the 2021 CKD-EPI creatinine equation which includes age and gender (Sandrine et al., NEJM, DOI: 10.1056/RTXJke3917388)   11/26/2019 >60 >60 mL/min/1.73m2 Final     Calcium   Date Value Ref Range Status   06/04/2022 8.8 8.5 - 10.1 mg/dL Final     Lab Results   Component Value Date    WBC 9.0 06/04/2022     Lab Results   Component Value Date    RBC 4.48 06/04/2022     Lab Results   Component Value Date    HGB 12.2 06/07/2022     Lab Results   Component Value Date    HCT 40.0 06/04/2022     Lab Results   Component Value Date    MCV 89 06/04/2022     Lab Results   Component Value Date    MCH 29.5 06/04/2022     Lab Results   Component Value Date    MCHC 33.0 06/04/2022     Lab Results   Component Value Date    RDW 12.2 06/04/2022     Lab Results   Component Value Date     06/08/2022     Assessment/Plan:  (S72.002D) Closed fracture of neck of left femur with routine healing, subsequent encounter  W19.XXXD)  Fall, subsequent encounter  (M62.81) Generalized muscle weakness  (R53.81) Physical deconditioning  Comment: Left femoral fracture 2/2 fall , s/p left hip hemiarthroplasty on 6/5, incision intact, no pain  Plan: Continue Tylenol, continue aspirin for DVT prophylaxis, continue OT/PT, Ortho follow-up as scheduled, maximize bowel regimen, fall precautions, monitor pain     (N39.0) Urinary tract infection without hematuria, site unspecified  (R33.9) Urinary retention  Comment:  Stable, voiding freely s/p Saab catheter, treated in the hospital with antibiotics x3 days for suggestive UTI, no  symptoms today  Plan: PVR as needed, monitor for UTI and retention    Electronically signed by:    Karsten Soni,DNP,APRN,AGNP-BC.

## 2022-06-23 NOTE — LETTER
6/23/2022        RE: Khloe Sparks  [address] [address]        No notes on file      Sincerely,        RAVINDRA Anaya CNP

## 2022-06-30 ENCOUNTER — DISCHARGE SUMMARY NURSING HOME (OUTPATIENT)
Dept: GERIATRICS | Facility: CLINIC | Age: 87
End: 2022-06-30
Payer: MEDICARE

## 2022-06-30 VITALS
BODY MASS INDEX: 20.06 KG/M2 | OXYGEN SATURATION: 98 % | RESPIRATION RATE: 16 BRPM | HEIGHT: 67 IN | SYSTOLIC BLOOD PRESSURE: 111 MMHG | HEART RATE: 77 BPM | TEMPERATURE: 97.7 F | DIASTOLIC BLOOD PRESSURE: 55 MMHG | WEIGHT: 127.8 LBS

## 2022-06-30 DIAGNOSIS — N39.0 URINARY TRACT INFECTION WITHOUT HEMATURIA, SITE UNSPECIFIED: ICD-10-CM

## 2022-06-30 DIAGNOSIS — R53.81 PHYSICAL DECONDITIONING: ICD-10-CM

## 2022-06-30 DIAGNOSIS — S72.002D CLOSED FRACTURE OF NECK OF LEFT FEMUR WITH ROUTINE HEALING, SUBSEQUENT ENCOUNTER: Primary | ICD-10-CM

## 2022-06-30 DIAGNOSIS — M62.81 GENERALIZED MUSCLE WEAKNESS: ICD-10-CM

## 2022-06-30 DIAGNOSIS — E78.5 HYPERLIPIDEMIA, UNSPECIFIED HYPERLIPIDEMIA TYPE: ICD-10-CM

## 2022-06-30 DIAGNOSIS — R21 RASH AND NONSPECIFIC SKIN ERUPTION: ICD-10-CM

## 2022-06-30 DIAGNOSIS — F02.80 ALZHEIMER'S DEMENTIA WITHOUT BEHAVIORAL DISTURBANCE, UNSPECIFIED TIMING OF DEMENTIA ONSET: ICD-10-CM
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
## 2022-06-30 DIAGNOSIS — H40.9 GLAUCOMA, UNSPECIFIED GLAUCOMA TYPE, UNSPECIFIED LATERALITY: ICD-10-CM

## 2022-06-30 DIAGNOSIS — G30.9 ALZHEIMER'S DEMENTIA WITHOUT BEHAVIORAL DISTURBANCE, UNSPECIFIED TIMING OF DEMENTIA ONSET: ICD-10-CM

## 2022-06-30 DIAGNOSIS — R33.9 URINARY RETENTION: ICD-10-CM

## 2022-06-30 DIAGNOSIS — I10 HYPERTENSION, UNSPECIFIED TYPE: ICD-10-CM

## 2022-06-30 PROCEDURE — 99316 NF DSCHRG MGMT 30 MIN+: CPT

## 2022-06-30 NOTE — PROGRESS NOTES
[facility] GERIATRICS DISCHARGE SUMMARY  PATIENT'S NAME: Khloe Sparks  YOB: 1935  MEDICAL RECORD NUMBER:  [id number]  Place of Service where encounter took place:  [facility] (U) [[id number]]    PRIMARY CARE PROVIDER AND CLINIC RESPONSIBLE AFTER TRANSFER:   Gina Jackson MD, [facility] [address] [facility] Provider     Transferring providers: Karsten Soni DNP, APRN, CNP; Krishna Steward MD  Recent Hospitalization/ED:  [facility] Hospital stay 6/4/22 to 6/8/22.  Date of SNF Admission: June 08, 2022  Date of SNF (anticipated) Discharge: June 30, 2022  Discharged to: previous assisted living  Cognitive Scores: At baseline   Physical Function: Able to ambulate up to 350 feet using 3-wheeled walker  DME: No new DME needed    CODE STATUS/ADVANCE DIRECTIVES DISCUSSION: Full Code  ALLERGIES: Patient has no known allergies.    NURSING FACILITY COURSE   Medication Changes/Rationale:     Discontinued oxycodone    Senna for constipation     Scheduled tylenol for pain     Discontinued hydrocortisone 1% cream     Summary of nursing facility stay:   Past medical history includes Alzheimer's dementia, hypertension, dyslipidemia presented to the hospital on 6/4 following fall.  Found to have left femoral neck fracture.  Underwent left hip hemiarthroplasty on 6/5 with Dr. Dalton.  UA shows suggestive of UTI though culture came back negative.  Received 3 days of antibiotics.  Hospital course complicated by acute urinary retention now resolved s/p Saab catheter.  Discharged to [facility] TCU on 6/8 for ongoing medical management, rehab, and skilled nursing care.     Patient being seen today for discharge orders.  History limited due to underlying cognitive impairment.  She did well throughout her TCU stay.  Able to ambulate up to 350 feet using 3-wheeled walker.  No reports of falls or injuries in TCU.   Mentation at baseline.  She denies pain or discomfort today.  Left hip surgical incision intact without signs of infection.  No reports of chest pain or shortness of breath.  Hemodynamically stable.  She is discharging today on stable condition with [facility] services via [facility] for PT/OT/HHA.  No concerns from staff.    Assessment and plan:  (S72.002D) Closed fracture of neck of left femur with routine healing, subsequent encounter  W19.XXXD) Fall, subsequent encounter  (M62.81) Generalized muscle weakness  (R53.81) Physical deconditioning  Comment: Left femoral fracture 2/2 fall , s/p left hip hemiarthroplasty on 6/5, incision intact, no pain  Plan: Continue Tylenol, continue aspirin for DVT prophylaxis, OT/PT, Ortho follow-up as scheduled, maximize bowel regimen, fall precautions, monitor pain     (N39.0) Urinary tract infection without hematuria, site unspecified  (R33.9) Urinary retention  Comment: Treated in the hospital for suggestive UTI, voiding freely s/p Saab cathete, no  symptoms  Plan: PVR as needed, monitor for UTI symptoms, Urology follow-up if indicated, monitor for retention    (I10) Hypertension, unspecified type  (E78.5) Hyperlipidemia, unspecified hyperlipidemia type  Comment:  BP stable  Plan: Continue lisinopril and atorvastatin, labs periodically, monitor BP     (G30.9,  F02.80) Alzheimer's dementia without behavioral disturbance, unspecified timing of dementia onset (H)  Comment: Chronic  Plan: Continue Aricept, assist with ADLs as indicated, OT/PT, monitor for changes in mood, behavior, and functional status     (H40.9) Glaucoma,unspecified glaucoma type, unspecified laterality   Comment: Chronic  Plan: Continue Dorzolamide HCl-Timolol Mal PF Solution 2-0.5 % and Latanoprost Solution 0.005 %, monitor for changes in vision     (R21) Rash and nonspecific skin eruption  Comment: Right lower leg rash without open skin, no indications of infection  Plan: Continue Triamcinolone Acetonide Cream 0.1  "%, monitor rash, PCP follow-up    Discharge Medications:  Current Outpatient Medications   Medication Sig Dispense Refill     acetaminophen (TYLENOL) 325 MG tablet Take 2 tablets (650 mg) by mouth every 4 hours as needed for other (For optimal non-opioid multimodal pain management to improve pain control.)       aspirin (ASA) 81 MG EC tablet Take 1 tablet (81 mg) by mouth daily 45 tablet      atorvastatin (LIPITOR) 40 MG tablet Take 0.5 tablets (20 mg) by mouth daily 90 tablet 3     donepezil (ARICEPT) 5 MG tablet Take 1 tablet (5 mg) by mouth At Bedtime 90 tablet 3     dorzolamide-timolol (COSOPT) 2-0.5 % ophthalmic solution Place 1 drop Into the left eye 2 times daily  1     latanoprost (XALATAN) 0.005 % ophthalmic solution instill 1 drop in both eyes at bedtime  3     lisinopril (ZESTRIL) 10 MG tablet Take 1 tablet (10 mg) by mouth daily 90 tablet 3     multivitamin (THERMEMS) TABS Take 1 tablet by mouth       polyethylene glycol (MIRALAX) 17 g packet Take 17 g by mouth daily       sennosides (SENOKOT) 8.6 MG tablet Take 1 tablet by mouth daily 15 tablet 0     triamcinolone (KENALOG) 0.1 % external cream Apply topically 2 times daily (Patient taking differently: Apply topically 2 times daily as needed (eczema spots on legs)) 15 g 0      Controlled medications:   not applicable/none     Past Medical History:   Past Medical History:   Diagnosis Date     Dyslipidemia      Hypertension      Physical Exam:   Vitals: /55   Pulse 77   Temp 97.7  F (36.5  C)   Resp 16   Ht 1.702 m (5' 7\")   Wt 58 kg (127 lb 12.8 oz)   LMP  (LMP Unknown)   SpO2 98%   BMI 20.02 kg/m    BMI: Body mass index is 20.02 kg/m .     Exam:  GENERAL APPEARANCE: NAD, well groomed, well-nourished, appropriately dressed, forceful speech    RESPIRATORY: Clear to auscultation, even and unlabored, symmetrical chest wall expansion  CARDIOVASCULAR: RRR, no peripheral edema, S1/S2 normal, pulses positive   GASTROINTESTINAL: Soft, nontender, " nondistended, bowel sounds present   MUSCULOSKELETAL: Left hip surgical incision intact,  Steri-Strips dressing coming off, no complications,  moves all extremities  INTEGUMENTARY: Chronic right lower leg lateral aspect rash stable, no signs of infection   NEUROLOGICAL: Alert to self, memory loss, confusion  PSYCHOLOGICAL: Cooperative, pleasant    SNF labs: Recent labs in [facility] reviewed by me today.     DISCHARGE PLAN:    Follow up labs: Deferred to PCP     Medical Follow Up:      Follow up with primary care provider in 3-5 days      Discharge Services: Home Care:  Occupational Therapy, Physical Therapy and Home Health Aide       Discharge Instructions Verbalized to Patient at Discharge:     Continue to follow your diet:  Regular.     OK to shower but no bathing or soaking until approved by surgeon.     Keep incision covered with sterile dressing until it comes off.     Notify your surgeon/PCP if you have increased redness, swelling, tenderness, or drainage at your incision site.       TOTAL DISCHARGE TIME:   Greater than 30 minutes  Electronically signed by:   Karsten Soni,JEEVAN,APRN,AGNP-BC.       Documentation of Face to Face and Certification for Home Health Services    I certify that patient: Khloe Sparks is under my care and that I, or a nurse practitioner or physician's assistant working with me, had a face-to-face encounter that meets the physician face-to-face encounter requirements with this patient on: 6/30/2022.    This encounter with the patient was in whole, or in part, for the following medical condition, which is the primary reason for home health care:  -Left femoral neck fracture  -Left hip hemiarthroplasty  -Fall   -Generalized weakness  -Physical deconditioning    I certify that, based on my findings, the following services are medically necessary home health services: Occupational Therapy, Physical Therapy and HHA.    My clinical findings support the need for the above services because: Home  health aide is needed for assistance with ADLs; Occupational Therapy Services are needed to assess and treat cognitive ability and address ADL safety due to recent hospitalization and surgery, generalized weakness and physical deconditioning; Physical Therapy Services are needed to assess and treat the following functional impairments: physical deconditioning, generalized weakness, recent hospitalization and surgery, and use of walker.      Further, I certify that my clinical findings support that this patient is homebound (i.e. absences from home require considerable and taxing effort and are for medical reasons or [employer] services or infrequently or of short duration when for other reasons) because: Requires assistance of another person or specialized equipment to access medical services because patient unable to exit home safely on own due to: generalized weakness, physical deconditioning,  and fall risk.      Based on the above findings, I certify that this patient is confined to the home and needs intermittent HHA, physical therapy, and occupational therapy.  The patient is under my care, and I have initiated the establishment of the plan of care.   This patient will be followed by a physician who will periodically review the plan of care.    Physician/Provider to provide follow up care: Gina Jackson    Attending hospital physician (the Medicare certified [facility] provider): Krishna Steward MD.   Physician Signature: See electronic signature associated with these discharge orders.  Date: 6/30/2022        The above note was completed in part using Dragon voice recognition software. Although reviewed after completion, some word and grammatical errors may occur. Please contact the author of this note with any questions.

## 2022-06-30 NOTE — LETTER
6/30/2022        RE: Khloe Sparks  [address]  [place] [facility] [address]        [facility] GERIATRICS DISCHARGE SUMMARY  PATIENT'S NAME: Khloe Sparks  YOB: 1935  MEDICAL RECORD NUMBER:  [id number]  Place of Service where encounter took place:  Formerly [facility], later [facility] (TCU) [[id number]]    PRIMARY CARE PROVIDER AND CLINIC RESPONSIBLE AFTER TRANSFER:   Gina Jackson MD, [facility] [address] [facility] / [facility] Provider     Transferring providers: Karsten Soni DNP, APRN, CNP; Krishna Steward MD  Recent Hospitalization/ED:  [facility] Hospital stay 6/4/22 to 6/8/22.  Date of SNF Admission: June 08, 2022  Date of SNF (anticipated) Discharge: June 30, 2022  Discharged to: previous assisted living  Cognitive Scores: At baseline   Physical Function: Able to ambulate up to 350 feet using 3-wheeled walker  DME: No new DME needed    CODE STATUS/ADVANCE DIRECTIVES DISCUSSION: Full Code  ALLERGIES: Patient has no known allergies.    NURSING FACILITY COURSE   Medication Changes/Rationale:     Discontinued oxycodone    Senna for constipation     Scheduled tylenol for pain     Discontinued hydrocortisone 1% cream     Summary of nursing facility stay:   Past medical history includes Alzheimer's dementia, hypertension, dyslipidemia presented to the hospital on 6/4 following fall.  Found to have left femoral neck fracture.  Underwent left hip hemiarthroplasty on 6/5 with Dr. Dalton.  UA shows suggestive of UTI though culture came back negative.  Received 3 days of antibiotics.  Hospital course complicated by acute urinary retention now resolved s/p Saab catheter.  Discharged to [facility] TCU on 6/8 for ongoing medical management, rehab, and skilled nursing care.     Patient being seen today for discharge orders.  History limited due to underlying cognitive impairment.  She did well throughout her TCU stay.  Able to  ambulate up to 350 feet using 3-wheeled walker.  No reports of falls or injuries in TCU.  Mentation at baseline.  She denies pain or discomfort today.  Left hip surgical incision intact without signs of infection.  No reports of chest pain or shortness of breath.  Hemodynamically stable.  She is discharging today on stable condition with [facility] services via [facility] for PT/OT/HHA.  No concerns from staff.    Assessment and plan:  (S72.002D) Closed fracture of neck of left femur with routine healing, subsequent encounter  W19.XXXD) Fall, subsequent encounter  (M62.81) Generalized muscle weakness  (R53.81) Physical deconditioning  Comment: Left femoral fracture 2/2 fall , s/p left hip hemiarthroplasty on 6/5, incision intact, no pain  Plan: Continue Tylenol, continue aspirin for DVT prophylaxis, OT/PT, Ortho follow-up as scheduled, maximize bowel regimen, fall precautions, monitor pain     (N39.0) Urinary tract infection without hematuria, site unspecified  (R33.9) Urinary retention  Comment: Treated in the hospital for suggestive UTI, voiding freely s/p Saab cathete, no  symptoms  Plan: PVR as needed, monitor for UTI symptoms, Urology follow-up if indicated, monitor for retention    (I10) Hypertension, unspecified type  (E78.5) Hyperlipidemia, unspecified hyperlipidemia type  Comment:  BP stable  Plan: Continue lisinopril and atorvastatin, labs periodically, monitor BP     (G30.9,  F02.80) Alzheimer's dementia without behavioral disturbance, unspecified timing of dementia onset (H)  Comment: Chronic  Plan: Continue Aricept, assist with ADLs as indicated, OT/PT, monitor for changes in mood, behavior, and functional status     (H40.9) Glaucoma,unspecified glaucoma type, unspecified laterality   Comment: Chronic  Plan: Continue Dorzolamide HCl-Timolol Mal PF Solution 2-0.5 % and Latanoprost Solution 0.005 %, monitor for changes in vision     (R21) Rash and nonspecific skin eruption  Comment: Right lower leg rash without  "open skin, no indications of infection  Plan: Continue Triamcinolone Acetonide Cream 0.1 %, monitor rash, PCP follow-up    Discharge Medications:  Current Outpatient Medications   Medication Sig Dispense Refill     acetaminophen (TYLENOL) 325 MG tablet Take 2 tablets (650 mg) by mouth every 4 hours as needed for other (For optimal non-opioid multimodal pain management to improve pain control.)       aspirin (ASA) 81 MG EC tablet Take 1 tablet (81 mg) by mouth daily 45 tablet      atorvastatin (LIPITOR) 40 MG tablet Take 0.5 tablets (20 mg) by mouth daily 90 tablet 3     donepezil (ARICEPT) 5 MG tablet Take 1 tablet (5 mg) by mouth At Bedtime 90 tablet 3     dorzolamide-timolol (COSOPT) 2-0.5 % ophthalmic solution Place 1 drop Into the left eye 2 times daily  1     latanoprost (XALATAN) 0.005 % ophthalmic solution instill 1 drop in both eyes at bedtime  3     lisinopril (ZESTRIL) 10 MG tablet Take 1 tablet (10 mg) by mouth daily 90 tablet 3     multivitamin (THERMEMS) TABS Take 1 tablet by mouth       polyethylene glycol (MIRALAX) 17 g packet Take 17 g by mouth daily       sennosides (SENOKOT) 8.6 MG tablet Take 1 tablet by mouth daily 15 tablet 0     triamcinolone (KENALOG) 0.1 % external cream Apply topically 2 times daily (Patient taking differently: Apply topically 2 times daily as needed (eczema spots on legs)) 15 g 0      Controlled medications:   not applicable/none     Past Medical History:   Past Medical History:   Diagnosis Date     Dyslipidemia      Hypertension      Physical Exam:   Vitals: /55   Pulse 77   Temp 97.7  F (36.5  C)   Resp 16   Ht 1.702 m (5' 7\")   Wt 58 kg (127 lb 12.8 oz)   LMP  (LMP Unknown)   SpO2 98%   BMI 20.02 kg/m    BMI: Body mass index is 20.02 kg/m .     Exam:  GENERAL APPEARANCE: NAD, well groomed, well-nourished, appropriately dressed, forceful speech    RESPIRATORY: Clear to auscultation, even and unlabored, symmetrical chest wall expansion  CARDIOVASCULAR: RRR, " no peripheral edema, S1/S2 normal, pulses positive   GASTROINTESTINAL: Soft, nontender, nondistended, bowel sounds present   MUSCULOSKELETAL: Left hip surgical incision intact,  Steri-Strips dressing coming off, no complications,  moves all extremities  INTEGUMENTARY: Chronic right lower leg lateral aspect rash stable, no signs of infection   NEUROLOGICAL: Alert to self, memory loss, confusion  PSYCHOLOGICAL: Cooperative, pleasant    SNF labs: Recent labs in [facility] reviewed by me today.     DISCHARGE PLAN:    Follow up labs: Deferred to PCP     Medical Follow Up:      Follow up with primary care provider in 3-5 days      Discharge Services: Home Care:  Occupational Therapy, Physical Therapy and Home Health Aide       Discharge Instructions Verbalized to Patient at Discharge:     Continue to follow your diet:  Regular.     OK to shower but no bathing or soaking until approved by surgeon.     Keep incision covered with sterile dressing until it comes off.     Notify your surgeon/PCP if you have increased redness, swelling, tenderness, or drainage at your incision site.       TOTAL DISCHARGE TIME:   Greater than 30 minutes  Electronically signed by:   Karsten Soni,DNP,APRN,AGNP-BC.       Documentation of Face to Face and Certification for Home Health Services    I certify that patient: Khloe Sparks is under my care and that I, or a nurse practitioner or physician's assistant working with me, had a face-to-face encounter that meets the physician face-to-face encounter requirements with this patient on: 6/30/2022.    This encounter with the patient was in whole, or in part, for the following medical condition, which is the primary reason for home health care:  -Left femoral neck fracture  -Left hip hemiarthroplasty  -Fall   -Generalized weakness  -Physical deconditioning    I certify that, based on my findings, the following services are medically necessary home health services: Occupational Therapy, Physical Therapy  and HHA.    My clinical findings support the need for the above services because: Home health aide is needed for assistance with ADLs; Occupational Therapy Services are needed to assess and treat cognitive ability and address ADL safety due to recent hospitalization and surgery, generalized weakness and physical deconditioning; Physical Therapy Services are needed to assess and treat the following functional impairments: physical deconditioning, generalized weakness, recent hospitalization and surgery, and use of walker.      Further, I certify that my clinical findings support that this patient is homebound (i.e. absences from home require considerable and taxing effort and are for medical reasons or Congregational services or infrequently or of short duration when for other reasons) because: Requires assistance of another person or specialized equipment to access medical services because patient unable to exit home safely on own due to: generalized weakness, physical deconditioning,  and fall risk.      Based on the above findings, I certify that this patient is confined to the home and needs intermittent HHA, physical therapy, and occupational therapy.  The patient is under my care, and I have initiated the establishment of the plan of care.   This patient will be followed by a physician who will periodically review the plan of care.    Physician/Provider to provide follow up care: Gina Jackson    Attending hospital physician (the Medicare certified [facility] provider): Krishna Steward MD.   Physician Signature: See electronic signature associated with these discharge orders.  Date: 6/30/2022        The above note was completed in part using Dragon voice recognition software. Although reviewed after completion, some word and grammatical errors may occur. Please contact the author of this note with any questions.                     Sincerely,        RAVINDRA Anaya CNP

## 2022-07-11 ENCOUNTER — OFFICE VISIT (OUTPATIENT)
Dept: FAMILY MEDICINE | Facility: CLINIC | Age: 87
End: 2022-07-11
Payer: MEDICARE

## 2022-07-11 VITALS
HEART RATE: 63 BPM | RESPIRATION RATE: 16 BRPM | OXYGEN SATURATION: 97 % | TEMPERATURE: 97.7 F | DIASTOLIC BLOOD PRESSURE: 67 MMHG | BODY MASS INDEX: 19.15 KG/M2 | SYSTOLIC BLOOD PRESSURE: 104 MMHG | HEIGHT: 67 IN | WEIGHT: 122 LBS

## 2022-07-11 DIAGNOSIS — Z09 HOSPITAL DISCHARGE FOLLOW-UP: Primary | ICD-10-CM

## 2022-07-11 DIAGNOSIS — R21 RASH: ICD-10-CM

## 2022-07-11 PROCEDURE — 99213 OFFICE O/P EST LOW 20 MIN: CPT | Performed by: NURSE PRACTITIONER

## 2022-07-11 RX ORDER — TRIAMCINOLONE ACETONIDE 1 MG/G
CREAM TOPICAL 2 TIMES DAILY PRN
Qty: 80 G | Refills: 1 | Status: SHIPPED | OUTPATIENT
Start: 2022-07-11 | End: 2024-01-01

## 2022-07-11 ASSESSMENT — PAIN SCALES - GENERAL: PAINLEVEL: NO PAIN (0)

## 2022-07-11 NOTE — PROGRESS NOTES
Assessment & Plan   Problem List Items Addressed This Visit    None     Visit Diagnoses     Hospital discharge follow-up    -  Primary    Rash        Relevant Medications    triamcinolone (KENALOG) 0.1 % external cream         Patient is doing really well following discharge after a femur fracture and hemiarthroplasty.  She has no concerns today regarding this injury and surgery.  She is quite active and doing well with that  She also has a rash that she has used triamcinolone for and is requesting a refill    RAVINDRA Judge CNP  [facility] LULA Ledbetter is a 86 year old accompanied by her son, presenting for the following health issues:  No chief complaint on file.      HPI     Pt is feeling well today. Denies hip pain, able to ambulate with walker. Will have PT/OT into home. Son is with patient, he reports that Khloe has been doing ok at home, she does not sleep through the night but that is not new. Pts sons stay with her due to dementia.     Hospital Follow-up Visit:    Hospital/Nursing Home/ Rehab Facility: [facility] 6/4/22- 6/8/22, [facility] 6/9/22-6/30/22  Date of Admission: 6/4/22  Date of Discharge: 6/30/22  Reason(s) for Admission: fall resulting in L femoral neck fracture with hemiarthroplasty     Was your hospitalization related to COVID-19? No   Problems taking medications regularly:  None  Medication changes since discharge: None  Problems adhering to non-medication therapy:  None    Summary of hospitalization:  [facility] discharge summary reviewed  Diagnostic Tests/Treatments reviewed.  Follow up needed: none  Other Healthcare Providers Involved in Patient s Care:         Homecare  Update since discharge: improved. Post Discharge Medication Reconciliation: discharge medications reconciled, continue medications without change.  Plan of care communicated with patient and family         Review of Systems   Detailed as  "above       Objective    /67 (BP Location: Right arm, Patient Position: Sitting, Cuff Size: Adult Regular)   Pulse 63   Temp 97.7  F (36.5  C) (Temporal)   Resp 16   Ht 1.702 m (5' 7\")   Wt 55.3 kg (122 lb)   LMP  (LMP Unknown)   SpO2 97%   BMI 19.11 kg/m    Body mass index is 19.11 kg/m .  Physical Exam  Neurological:      Mental Status: She is alert.   Psychiatric:         Behavior: Behavior normal.                    .  ..   I saw this patient in collaboration with Dalia Richmond      I was present with the APRN/PA student who participated in the service and in the documentation of the services provided. I have verified the history and personally performed the physical exam and medical decision making, as documented by the student and edited by me.     RAVINDRA Ny, LEONIE Richmond NP Student      "

## 2022-07-13 ENCOUNTER — TELEPHONE (OUTPATIENT)
Dept: FAMILY MEDICINE | Facility: CLINIC | Age: 87
End: 2022-07-13

## 2022-07-13 NOTE — TELEPHONE ENCOUNTER
PT requesting-  1x/ wk for 1 wk   2x/wk for 2 wks   1x/wk for 1 wk   therapeutic exercise, gait, balance training,and pain.   Verbal okay given per policy.       Ana Paula is also requesting-  ST eval and treat.   For expressive aphasia. Pt coughs when she swallows.   Routing to provider for recommendations.     Please call Ana Paula back with providers recommendations.   Can we leave a detailed message on this number? YES  Phone number patient can be reached at: [phone]    Rachel Jalloh RN  [facility] Triage

## 2022-07-15 ENCOUNTER — TELEPHONE (OUTPATIENT)
Dept: FAMILY MEDICINE | Facility: CLINIC | Age: 87
End: 2022-07-15

## 2022-07-15 NOTE — TELEPHONE ENCOUNTER
Hetal from [facility] requesting order for delay of care. Pt has speech therapy eval ordered to be done this week. Pt declined stating she wishes  to complete next week.    Please call Hetal with approval/denial at [phone]. Ok to leave detailed VM.    Zeny ACOSTA RN  EP Triage

## 2022-07-18 ENCOUNTER — TELEPHONE (OUTPATIENT)
Dept: FAMILY MEDICINE | Facility: CLINIC | Age: 87
End: 2022-07-18

## 2022-07-18 NOTE — TELEPHONE ENCOUNTER
Reason for Call: Request for an order or referral:    Order or referral being requested: Verbal    Date needed: as soon as possible    Has the patient been seen by the PCP for this problem?     Additional comments: Speech Therapy for swollowing for 1 time a week for one week.    Phone number Hetal at [employer] can be reached at:  [phone]    Best Time:  ANY    Can we leave a detailed message on this number?  YES    Call taken on 7/18/2022 at 4:17 PM by Sveta Ricketts

## 2022-07-20 ENCOUNTER — MEDICAL CORRESPONDENCE (OUTPATIENT)
Dept: HEALTH INFORMATION MANAGEMENT | Facility: CLINIC | Age: 87
End: 2022-07-20

## 2022-07-21 ENCOUNTER — MEDICAL CORRESPONDENCE (OUTPATIENT)
Dept: HEALTH INFORMATION MANAGEMENT | Facility: CLINIC | Age: 87
End: 2022-07-21

## 2022-07-22 ENCOUNTER — TELEPHONE (OUTPATIENT)
Dept: FAMILY MEDICINE | Facility: CLINIC | Age: 87
End: 2022-07-22

## 2022-07-22 NOTE — TELEPHONE ENCOUNTER
FYI to PCP:    Home Care OT to see patient on August 1 st for eval.  Was to be seen today but patient cancelled.    Isabel Mercedes RN  [facility]

## 2022-07-28 ENCOUNTER — MEDICAL CORRESPONDENCE (OUTPATIENT)
Dept: HEALTH INFORMATION MANAGEMENT | Facility: CLINIC | Age: 87
End: 2022-07-28

## 2022-07-29 ENCOUNTER — TELEPHONE (OUTPATIENT)
Dept: FAMILY MEDICINE | Facility: CLINIC | Age: 87
End: 2022-07-29

## 2022-07-29 NOTE — TELEPHONE ENCOUNTER
FYI TO PCP:     Home care OT (Jane with [facility]) to see patient on 8/1/22 for evaluation.     Jane's callback number for any further questions/concerns - [phone]    Fern Galaviz RN  [facility]

## 2022-08-01 ENCOUNTER — MEDICAL CORRESPONDENCE (OUTPATIENT)
Dept: HEALTH INFORMATION MANAGEMENT | Facility: CLINIC | Age: 87
End: 2022-08-01

## 2022-08-03 ENCOUNTER — MEDICAL CORRESPONDENCE (OUTPATIENT)
Dept: HEALTH INFORMATION MANAGEMENT | Facility: CLINIC | Age: 87
End: 2022-08-03

## 2022-08-04 ENCOUNTER — MEDICAL CORRESPONDENCE (OUTPATIENT)
Dept: FAMILY MEDICINE | Facility: CLINIC | Age: 87
End: 2022-08-04

## 2022-09-26 ENCOUNTER — TELEPHONE (OUTPATIENT)
Dept: FAMILY MEDICINE | Facility: CLINIC | Age: 87
End: 2022-09-26

## 2022-09-26 DIAGNOSIS — K59.00 CONSTIPATION, UNSPECIFIED CONSTIPATION TYPE: Primary | ICD-10-CM

## 2022-09-26 DIAGNOSIS — Z76.0 ENCOUNTER FOR MEDICATION REFILL: Primary | ICD-10-CM

## 2022-09-26 DIAGNOSIS — S72.002A LEFT DISPLACED FEMORAL NECK FRACTURE (H): ICD-10-CM

## 2022-09-26 NOTE — TELEPHONE ENCOUNTER
Pharmacy requesting patient's eye drops from Dr Akbar  Last Rx'd received from [facility], patient now out of medication    Dr Akbar has never written, per dispensing report - Teri Cornejo was last prescriber for both requested drops    Latanoprost 0.005% - instill 1 drop on both eyes at bedtime  Dorzolamide-Timolol 22.3-6.8mg/mL  - instill 1 drop into LEFT eye twice daily    LOV 7- [facility] for hospital follow up, 1-6-2022 [facility] for AWE  No future OV scheduled    How to handle?    Pamela Murcia, RT (R)

## 2022-09-26 NOTE — TELEPHONE ENCOUNTER
Last OV with PCP 07/11/2022 with PCP 01/06/2022.     Routing refill request to provider for review/approval because:  Medication is reported/historical

## 2022-09-26 NOTE — TELEPHONE ENCOUNTER
Last Rx'd from TCU discharge    LOV 7- [facility] for hospital follow up, 1-6-2022 [facility] for AWE  No future OV scheduled    Pamela Murcia RT (R)

## 2022-09-29 RX ORDER — POLYETHYLENE GLYCOL 3350 17 G/17G
17 POWDER, FOR SOLUTION ORAL DAILY
Qty: 100 EACH | Status: CANCELLED | OUTPATIENT
Start: 2022-09-29

## 2022-10-03 RX ORDER — POLYETHYLENE GLYCOL 3350 17 G/17G
17 POWDER, FOR SOLUTION ORAL DAILY
Qty: 510 G | Refills: 3 | Status: SHIPPED | OUTPATIENT
Start: 2022-10-03 | End: 2024-01-01

## 2022-10-03 RX ORDER — LATANOPROST 50 UG/ML
1 SOLUTION/ DROPS OPHTHALMIC DAILY
Qty: 2.5 ML | Refills: 0 | Status: SHIPPED | OUTPATIENT
Start: 2022-10-03 | End: 2022-12-12

## 2022-10-03 RX ORDER — DORZOLAMIDE HYDROCHLORIDE AND TIMOLOL MALEATE 20; 5 MG/ML; MG/ML
1 SOLUTION/ DROPS OPHTHALMIC 2 TIMES DAILY
Qty: 10 ML | Refills: 0 | Status: ON HOLD | OUTPATIENT
Start: 2022-10-03 | End: 2023-01-16

## 2022-11-15 ENCOUNTER — TELEPHONE (OUTPATIENT)
Dept: FAMILY MEDICINE | Facility: CLINIC | Age: 87
End: 2022-11-15

## 2022-11-15 NOTE — TELEPHONE ENCOUNTER
TO PCP:     Pt's son (Alen) called, requesting a letter faxed stating he is a full time caregiver for patient and therefore is excused from Jury duty     Fax to   (f) [phone]  [address]   [address] [place] [address]    Letter needs to include Juror #: [phone]    Please advise - detailed message omari LYONS, Triage RN  [facility]

## 2022-11-16 ENCOUNTER — VIRTUAL VISIT (OUTPATIENT)
Dept: FAMILY MEDICINE | Facility: CLINIC | Age: 87
End: 2022-11-16
Payer: MEDICARE

## 2022-11-16 DIAGNOSIS — E78.5 HYPERLIPIDEMIA, UNSPECIFIED HYPERLIPIDEMIA TYPE: ICD-10-CM

## 2022-11-16 DIAGNOSIS — I10 HYPERTENSION, UNSPECIFIED TYPE: ICD-10-CM

## 2022-11-16 DIAGNOSIS — F03.C0 SEVERE DEMENTIA, UNSPECIFIED DEMENTIA TYPE, UNSPECIFIED WHETHER BEHAVIORAL, PSYCHOTIC, OR MOOD DISTURBANCE OR ANXIETY (H): Primary | ICD-10-CM

## 2022-11-16 PROCEDURE — 99214 OFFICE O/P EST MOD 30 MIN: CPT | Mod: 95 | Performed by: INTERNAL MEDICINE

## 2022-11-16 NOTE — LETTER
November 16, 2022      Khloe Sparks  [address] [facility] [address]        [address]   [address] [place] [address], [address] [facility] [id number] (f) [phone]      RE: Jury duty excuse for patient's son Alen Sparks, Juror #: [phone]      To Whom It May Concern:      Please excuse Alen Sparks, Juror #: [id number], from his upcoming jury duty because Alen is the full time caregiver of his mother Khloe, who has severe dementia and requires full time care at home.           Sincerely,              Viri Akbar MD

## 2022-11-16 NOTE — PROGRESS NOTES
Khloe is a 87 year old who is being evaluated via a billable telephone visit.      What phone number would you like to be contacted at? [phone]  How would you like to obtain your AVS? Mail a copy    Subjective   Khloe is a 87 year old accompanied by her son, presenting for the following health issues:  Letter Request      HPI     Chief Complaint:   Follow up on multiple concerns including chronic dementia      HPI:   Patient Khloe Sparks is a very pleasant 87 year old female with history of dementia who presents to Internal Medicine clinic today for telephone visit for follow up on multiple concerns including chronic dementia. Regarding the patient's chronic dementia, the patient's son needs a letter to be excused from jury duty because he is the primary caretaker of the patient who needs full time care.         Current Medications:     Current Outpatient Medications   Medication Sig Dispense Refill     acetaminophen (TYLENOL) 325 MG tablet Take 2 tablets (650 mg) by mouth every 4 hours as needed for other (For optimal non-opioid multimodal pain management to improve pain control.)       aspirin (ASA) 81 MG EC tablet Take 1 tablet (81 mg) by mouth daily 45 tablet      atorvastatin (LIPITOR) 40 MG tablet Take 0.5 tablets (20 mg) by mouth daily 90 tablet 3     donepezil (ARICEPT) 5 MG tablet Take 1 tablet (5 mg) by mouth At Bedtime 90 tablet 3     dorzolamide-timolol (COSOPT) 2-0.5 % ophthalmic solution Place 1 drop into both eyes 2 times daily 10 mL 0     dorzolamide-timolol (COSOPT) 2-0.5 % ophthalmic solution Place 1 drop Into the left eye 2 times daily  1     latanoprost (XALATAN) 0.005 % ophthalmic solution Place 1 drop into both eyes daily 2.5 mL 0     latanoprost (XALATAN) 0.005 % ophthalmic solution instill 1 drop in both eyes at bedtime  3     lisinopril (ZESTRIL) 10 MG tablet Take 1 tablet (10 mg) by mouth daily 90 tablet 3     multivitamin (THERMEMS) TABS Take 1 tablet by mouth       polyethylene  glycol (MIRALAX) 17 GM/Dose powder Take 17 g by mouth daily 510 g 3     sennosides (SENOKOT) 8.6 MG tablet Take 1 tablet by mouth daily 15 tablet 0     triamcinolone (KENALOG) 0.1 % external cream Apply topically 2 times daily as needed (eczema spots on legs) 80 g 1         Allergies:    No Known Allergies         Past Medical History:     Past Medical History:   Diagnosis Date     Dyslipidemia      Hypertension          Past Surgical History:     Past Surgical History:   Procedure Laterality Date     HC REMOVE TONSILS/ADENOIDS,<11 Y/O      Description: Tonsillectomy With Adenoidectomy;  Recorded: 2010;     OPEN REDUCTION INTERNAL FIXATION HIP UNIPOLAR Left 2022    Procedure: LEFT Hip Hemiarthroplasty;  Surgeon: Chacho Dalton MD;  Location: [facility] OR     PA CLOSED RX DIST RAD/ULNA FX,MANIPUL      Description: Closed Treatment Of Fracture Of Distal Radius, Manipulation;  Proc Date: 2002;  Comments: fell from ladder     PA CLOSED RX MID HUMERUS FRACTURE      Description: Closed Treat Of Fracture Of The Humeral Shaft;  Proc Date: 2007;  Comments: fell from wall         Family Medical History:     Family History   Problem Relation Age of Onset     Breast Cancer Sister          of this but had MS as well     Alzheimer Disease Sister          age 79     Multiple Sclerosis Mother          age 80     Pancreatic Cancer Father          age 70     Cancer Brother         laryngeal cancer  in his 60s         Social History:     Social History     Socioeconomic History     Marital status:      Spouse name: Not on file     Number of children: 3     Years of education: Not on file     Highest education level: Not on file   Occupational History     Not on file   Tobacco Use     Smoking status: Former     Types: Cigarettes     Quit date: 1975     Years since quittin.3     Smokeless tobacco: Never     Tobacco comments:     quit     Substance and Sexual Activity     Alcohol  use: Yes     Comment: Alcoholic Drinks/day: glass per week     Drug use: No     Sexual activity: Not Currently   Other Topics Concern     Not on file   Social History Narrative    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 She was  for almost 60 years to Eric and he  3/2016 (he was 83) while taking a nap next to her.  She has 3 boys, and 4 grandchildren.  She has a glass of red wine almost daily.  One son lives  with her.     Social Determinants of Health     Financial Resource Strain: Not on file   Food Insecurity: Not on file   Transportation Needs: Not on file   Physical Activity: Not on file   Stress: Not on file   Social Connections: Not on file   Intimate Partner Violence: Not on file   Housing Stability: Not on file           Review of System:     Constitutional: Negative for fever or chills  Skin: Negative for rashes  Ears/Nose/Throat: Negative for nasal congestion, sore throat  Respiratory: No shortness of breath, dyspnea on exertion, cough, or hemoptysis  Cardiovascular: Negative for chest pain  Gastrointestinal: Negative for nausea, vomiting  Genitourinary: Negative for dysuria, hematuria  Musculoskeletal: Negative for myalgias  Neurologic: Negative for headaches, positive for dementia  Psychiatric: Negative for depression, anxiety  Hematologic/Lymphatic/Immunologic: Negative  Endocrine: Negative  Behavioral: Negative for tobacco use       Physical Exam:   LMP  (LMP Unknown)     RESP: no cough over the phone   NEURO: Alert & Oriented x 3.   PSYCH: mentation appears normal, affect normal        Diagnostic Test Results:     Diagnostic Test Results:  Labs reviewed in Epic    ASSESSMENT/PLAN:       Khloe was seen today for letter request.    Diagnoses and all orders for this visit:    Severe dementia, unspecified dementia type, unspecified whether behavioral, psychotic, or mood disturbance or anxiety  - jury duty excuse letter written today  - stable, continue current  therapy    Hypertension, unspecified type  - stable, continue current therapy    Hyperlipidemia, unspecified hyperlipidemia type  - stable, continue current therapy      Follow Up Plan:     Patient is instructed to return to Internal Medicine clinic for follow-up visit in 3 months.        Viri Akbar MD  Internal Medicine  [facility]        telephone visit duration including chart review, medication management: 30 minutes

## 2022-12-11 DIAGNOSIS — Z76.0 ENCOUNTER FOR MEDICATION REFILL: ICD-10-CM

## 2022-12-12 RX ORDER — LATANOPROST 50 UG/ML
1 SOLUTION/ DROPS OPHTHALMIC DAILY
Qty: 2.5 ML | Refills: 0 | Status: ON HOLD | OUTPATIENT
Start: 2022-12-12 | End: 2023-01-16

## 2023-01-15 ENCOUNTER — APPOINTMENT (OUTPATIENT)
Dept: GENERAL RADIOLOGY | Facility: CLINIC | Age: 88
DRG: 482 | End: 2023-01-15
Attending: PHYSICIAN ASSISTANT
Payer: MEDICARE

## 2023-01-15 ENCOUNTER — APPOINTMENT (OUTPATIENT)
Dept: CT IMAGING | Facility: CLINIC | Age: 88
DRG: 482 | End: 2023-01-15
Attending: PHYSICIAN ASSISTANT
Payer: MEDICARE

## 2023-01-15 ENCOUNTER — HOSPITAL ENCOUNTER (INPATIENT)
Facility: CLINIC | Age: 88
LOS: 5 days | Discharge: NURSING, REHAB OR LONG-TERM CARE | DRG: 482 | End: 2023-01-20
Attending: PHYSICIAN ASSISTANT | Admitting: HOSPITALIST
Payer: MEDICARE

## 2023-01-15 ENCOUNTER — APPOINTMENT (OUTPATIENT)
Dept: GENERAL RADIOLOGY | Facility: CLINIC | Age: 88
DRG: 482 | End: 2023-01-15
Attending: HOSPITALIST
Payer: MEDICARE

## 2023-01-15 DIAGNOSIS — K80.20 GALL STONES: ICD-10-CM

## 2023-01-15 DIAGNOSIS — S72.001A CLOSED DISPLACED FRACTURE OF RIGHT FEMORAL NECK (H): ICD-10-CM

## 2023-01-15 DIAGNOSIS — I67.1 BRAIN ANEURYSM: ICD-10-CM

## 2023-01-15 DIAGNOSIS — N85.9 LESION OF UTERUS: ICD-10-CM

## 2023-01-15 LAB
ANION GAP SERPL CALCULATED.3IONS-SCNC: 7 MMOL/L (ref 3–14)
BASOPHILS # BLD AUTO: 0 10E3/UL (ref 0–0.2)
BASOPHILS NFR BLD AUTO: 1 %
BUN SERPL-MCNC: 13 MG/DL (ref 7–30)
CALCIUM SERPL-MCNC: 8.9 MG/DL (ref 8.5–10.1)
CHLORIDE BLD-SCNC: 107 MMOL/L (ref 94–109)
CO2 SERPL-SCNC: 27 MMOL/L (ref 20–32)
CREAT SERPL-MCNC: 0.55 MG/DL (ref 0.52–1.04)
EOSINOPHIL # BLD AUTO: 0.1 10E3/UL (ref 0–0.7)
EOSINOPHIL NFR BLD AUTO: 1 %
ERYTHROCYTE [DISTWIDTH] IN BLOOD BY AUTOMATED COUNT: 15.2 % (ref 10–15)
GFR SERPL CREATININE-BSD FRML MDRD: 88 ML/MIN/1.73M2
GLUCOSE BLD-MCNC: 97 MG/DL (ref 70–99)
HCT VFR BLD AUTO: 44.4 % (ref 35–47)
HGB BLD-MCNC: 14.4 G/DL (ref 11.7–15.7)
IMM GRANULOCYTES # BLD: 0 10E3/UL
IMM GRANULOCYTES NFR BLD: 0 %
LYMPHOCYTES # BLD AUTO: 1.4 10E3/UL (ref 0.8–5.3)
LYMPHOCYTES NFR BLD AUTO: 18 %
MCH RBC QN AUTO: 29.4 PG (ref 26.5–33)
MCHC RBC AUTO-ENTMCNC: 32.4 G/DL (ref 31.5–36.5)
MCV RBC AUTO: 91 FL (ref 78–100)
MONOCYTES # BLD AUTO: 0.8 10E3/UL (ref 0–1.3)
MONOCYTES NFR BLD AUTO: 10 %
NEUTROPHILS # BLD AUTO: 5.2 10E3/UL (ref 1.6–8.3)
NEUTROPHILS NFR BLD AUTO: 70 %
NRBC # BLD AUTO: 0 10E3/UL
NRBC BLD AUTO-RTO: 0 /100
PLATELET # BLD AUTO: 194 10E3/UL (ref 150–450)
POTASSIUM BLD-SCNC: 3.9 MMOL/L (ref 3.4–5.3)
RBC # BLD AUTO: 4.89 10E6/UL (ref 3.8–5.2)
SODIUM SERPL-SCNC: 141 MMOL/L (ref 133–144)
WBC # BLD AUTO: 7.5 10E3/UL (ref 4–11)

## 2023-01-15 PROCEDURE — 71045 X-RAY EXAM CHEST 1 VIEW: CPT

## 2023-01-15 PROCEDURE — 99285 EMERGENCY DEPT VISIT HI MDM: CPT | Mod: 25

## 2023-01-15 PROCEDURE — 70450 CT HEAD/BRAIN W/O DYE: CPT | Mod: ME

## 2023-01-15 PROCEDURE — 258N000003 HC RX IP 258 OP 636: Performed by: HOSPITALIST

## 2023-01-15 PROCEDURE — 250N000013 HC RX MED GY IP 250 OP 250 PS 637: Performed by: PHYSICIAN ASSISTANT

## 2023-01-15 PROCEDURE — 120N000001 HC R&B MED SURG/OB

## 2023-01-15 PROCEDURE — 73502 X-RAY EXAM HIP UNI 2-3 VIEWS: CPT

## 2023-01-15 PROCEDURE — C9803 HOPD COVID-19 SPEC COLLECT: HCPCS

## 2023-01-15 PROCEDURE — 73562 X-RAY EXAM OF KNEE 3: CPT | Mod: RT

## 2023-01-15 PROCEDURE — 250N000013 HC RX MED GY IP 250 OP 250 PS 637: Performed by: HOSPITALIST

## 2023-01-15 PROCEDURE — 80048 BASIC METABOLIC PNL TOTAL CA: CPT | Performed by: PHYSICIAN ASSISTANT

## 2023-01-15 PROCEDURE — 36415 COLL VENOUS BLD VENIPUNCTURE: CPT | Performed by: PHYSICIAN ASSISTANT

## 2023-01-15 PROCEDURE — 85025 COMPLETE CBC W/AUTO DIFF WBC: CPT | Performed by: PHYSICIAN ASSISTANT

## 2023-01-15 PROCEDURE — 72131 CT LUMBAR SPINE W/O DYE: CPT | Mod: ME

## 2023-01-15 PROCEDURE — 82306 VITAMIN D 25 HYDROXY: CPT | Performed by: PHYSICIAN ASSISTANT

## 2023-01-15 PROCEDURE — 99223 1ST HOSP IP/OBS HIGH 75: CPT | Mod: AI | Performed by: HOSPITALIST

## 2023-01-15 PROCEDURE — 250N000011 HC RX IP 250 OP 636: Performed by: HOSPITALIST

## 2023-01-15 PROCEDURE — U0005 INFEC AGEN DETEC AMPLI PROBE: HCPCS | Performed by: HOSPITALIST

## 2023-01-15 RX ORDER — NALOXONE HYDROCHLORIDE 0.4 MG/ML
0.2 INJECTION, SOLUTION INTRAMUSCULAR; INTRAVENOUS; SUBCUTANEOUS
Status: DISCONTINUED | OUTPATIENT
Start: 2023-01-15 | End: 2023-01-16

## 2023-01-15 RX ORDER — ONDANSETRON 2 MG/ML
4 INJECTION INTRAMUSCULAR; INTRAVENOUS EVERY 6 HOURS PRN
Status: DISCONTINUED | OUTPATIENT
Start: 2023-01-15 | End: 2023-01-16

## 2023-01-15 RX ORDER — LIDOCAINE 40 MG/G
CREAM TOPICAL
Status: DISCONTINUED | OUTPATIENT
Start: 2023-01-15 | End: 2023-01-16

## 2023-01-15 RX ORDER — NALOXONE HYDROCHLORIDE 0.4 MG/ML
0.4 INJECTION, SOLUTION INTRAMUSCULAR; INTRAVENOUS; SUBCUTANEOUS
Status: DISCONTINUED | OUTPATIENT
Start: 2023-01-15 | End: 2023-01-16

## 2023-01-15 RX ORDER — OXYCODONE HYDROCHLORIDE 5 MG/1
5 TABLET ORAL EVERY 6 HOURS PRN
Status: DISCONTINUED | OUTPATIENT
Start: 2023-01-15 | End: 2023-01-15

## 2023-01-15 RX ORDER — AMOXICILLIN 250 MG
1 CAPSULE ORAL 2 TIMES DAILY PRN
Status: DISCONTINUED | OUTPATIENT
Start: 2023-01-15 | End: 2023-01-16

## 2023-01-15 RX ORDER — DONEPEZIL HYDROCHLORIDE 5 MG/1
5 TABLET, FILM COATED ORAL AT BEDTIME
Status: DISCONTINUED | OUTPATIENT
Start: 2023-01-15 | End: 2023-01-16

## 2023-01-15 RX ORDER — LISINOPRIL 5 MG/1
5 TABLET ORAL DAILY
Status: DISCONTINUED | OUTPATIENT
Start: 2023-01-16 | End: 2023-01-16

## 2023-01-15 RX ORDER — ACETAMINOPHEN 325 MG/1
650 TABLET ORAL EVERY 4 HOURS PRN
Status: DISCONTINUED | OUTPATIENT
Start: 2023-01-15 | End: 2023-01-16

## 2023-01-15 RX ORDER — ONDANSETRON 4 MG/1
4 TABLET, ORALLY DISINTEGRATING ORAL EVERY 6 HOURS PRN
Status: DISCONTINUED | OUTPATIENT
Start: 2023-01-15 | End: 2023-01-16

## 2023-01-15 RX ORDER — OXYCODONE HYDROCHLORIDE 5 MG/1
5 TABLET ORAL ONCE
Status: COMPLETED | OUTPATIENT
Start: 2023-01-15 | End: 2023-01-15

## 2023-01-15 RX ORDER — HYDROMORPHONE HCL IN WATER/PF 6 MG/30 ML
0.2 PATIENT CONTROLLED ANALGESIA SYRINGE INTRAVENOUS EVERY 6 HOURS PRN
Status: DISCONTINUED | OUTPATIENT
Start: 2023-01-15 | End: 2023-01-16

## 2023-01-15 RX ORDER — ACETAMINOPHEN 650 MG/1
650 SUPPOSITORY RECTAL EVERY 4 HOURS PRN
Status: DISCONTINUED | OUTPATIENT
Start: 2023-01-15 | End: 2023-01-16

## 2023-01-15 RX ORDER — LISINOPRIL 10 MG/1
10 TABLET ORAL DAILY
Status: DISCONTINUED | OUTPATIENT
Start: 2023-01-16 | End: 2023-01-15

## 2023-01-15 RX ORDER — SODIUM CHLORIDE 9 MG/ML
INJECTION, SOLUTION INTRAVENOUS CONTINUOUS
Status: DISCONTINUED | OUTPATIENT
Start: 2023-01-15 | End: 2023-01-16

## 2023-01-15 RX ORDER — OXYCODONE HYDROCHLORIDE 5 MG/1
5 TABLET ORAL EVERY 6 HOURS PRN
Status: DISCONTINUED | OUTPATIENT
Start: 2023-01-15 | End: 2023-01-16

## 2023-01-15 RX ORDER — HYDRALAZINE HYDROCHLORIDE 20 MG/ML
10 INJECTION INTRAMUSCULAR; INTRAVENOUS EVERY 4 HOURS PRN
Status: DISCONTINUED | OUTPATIENT
Start: 2023-01-15 | End: 2023-01-16

## 2023-01-15 RX ORDER — AMOXICILLIN 250 MG
2 CAPSULE ORAL 2 TIMES DAILY PRN
Status: DISCONTINUED | OUTPATIENT
Start: 2023-01-15 | End: 2023-01-16

## 2023-01-15 RX ADMIN — OXYCODONE HYDROCHLORIDE 5 MG: 5 TABLET ORAL at 18:52

## 2023-01-15 RX ADMIN — SODIUM CHLORIDE: 9 INJECTION, SOLUTION INTRAVENOUS at 22:12

## 2023-01-15 RX ADMIN — DONEPEZIL HYDROCHLORIDE 5 MG: 5 TABLET, FILM COATED ORAL at 22:12

## 2023-01-15 RX ADMIN — HYDROMORPHONE HYDROCHLORIDE 0.2 MG: 0.2 INJECTION, SOLUTION INTRAMUSCULAR; INTRAVENOUS; SUBCUTANEOUS at 22:04

## 2023-01-15 ASSESSMENT — ACTIVITIES OF DAILY LIVING (ADL)
ADLS_ACUITY_SCORE: 35
ADLS_ACUITY_SCORE: 35
ADLS_ACUITY_SCORE: 43
ADLS_ACUITY_SCORE: 35

## 2023-01-15 ASSESSMENT — ENCOUNTER SYMPTOMS
BACK PAIN: 0
ARTHRALGIAS: 1

## 2023-01-15 NOTE — ED PROVIDER NOTES
History     Chief Complaint:  Hip Pain       HPI   Khloe Sparks is a 87 year old female who presents with fall. Patient struggles with speech but is still able to communicate and comprehend questions (this is not new). Patient's family member reports that people saw the patient fall earlier today. He states that she did not hit her head or loose conscious. Patient reports that she fell completely to the ground. She reports pain in her right hip and knee. She denies having any back pain. Family member notes that patient is not on blood thinners.  He also notes that patients family history contained Alzheimer and possibly dementia.     Independent Historian: Patient and family member      Review of External Notes:      ROS:  Review of Systems   Cardiovascular: Negative for chest pain.   Gastrointestinal: Negative for nausea and vomiting.   Musculoskeletal: Positive for arthralgias (Right hip and knee). Negative for back pain and neck pain.   Neurological: Positive for speech difficulty (chronic). Negative for syncope, numbness and headaches.   All other systems reviewed and are negative.    Allergies:  No Known Allergies     Medications:    Aspirin 81  Lipitor  Aricept  Zestril  Thermems  Senokot    Past Medical History:    Dyslipidemia  Hypertension  Osteopenia  Hyperlipidemia  Glaucoma  Pustules  Lichen planus    Past Surgical History:    Tonsillectomy  Humerus fracture    Family History:    Father: Pancreatic cancer  Mother: Multiple sclerosis   Brother(s): Laryngeal cancer  Sister(s): Breast cancer, Alzheimer's disease    Social History:  Patient arrived with family member via private vehicle to the ED.   PCP: Viri Akbar     Physical Exam     Patient Vitals for the past 24 hrs:   BP Temp Temp src Pulse Resp SpO2   01/16/23 0749 136/75 98  F (36.7  C) Axillary 81 17 92 %   01/16/23 0450 -- -- -- -- 16 --   01/16/23 0435 -- -- -- -- 18 --   01/16/23 0040 138/82 98  F (36.7  C) Axillary 98 16 96 %    01/15/23 2219 -- -- -- -- 16 --   01/15/23 2056 (!) 147/88 98.3  F (36.8  C) Axillary -- 18 94 %   01/15/23 2034 -- -- -- -- -- 90 %   01/15/23 2033 -- -- -- -- -- 91 %   01/15/23 2031 -- -- -- -- -- 94 %   01/15/23 2030 -- -- -- -- -- 93 %   01/15/23 1603 132/66 98.5  F (36.9  C) Temporal 78 20 95 %        Physical Exam  Head : no raccoon eyes or jarrell's sign.  Eyes: Nonicteric, noninjected, normal range of motion, PERRLA  Nose: Not congested, no rhinorrhea  Ears: Bilateral tympanic membranes are pearly gray without erythema, or bulging.  Canals are free of discharge.  TMs are intact. No hemotympanum.  Oropharynx: No erythema of the back of the throat, uvula midline, moist mucous membranes, no tonsillitis, no trismus.  Neck: No cervical midline tenderness.  No midline pain with full ROM.  Heart: Regular rate and rhythm without murmurs, rubs, gallops  Lungs: Bilateral breath sounds, Clear to auscultation, no wheezing, rhonchi, Rales, crackles.  Normal respiratory excursion.  Abdomen: Soft, nontender to palpation in all 4 quadrants without rebound or guarding.  Nondistended.   Skin: No wounds  Neuro: Alert and oriented x3, symmetrical facial features, speech normal, bilateral upper extremity strength 5-5 with , 5-5 bilateral lower extremity strength with flexion-extension of the left hip, knees, ankles.  Decreased strength of the Left hip due to pain. Sensation equal and normal grossly bilaterally.  No tongue deviation.  Upper extremities: No pain with palpation of bilateral shoulders, elbows, wrists with full ROM without pain.  No point tenderness to the humerus or forearms.  Lower extremities: Normal ROM of the left hip, knees, ankles.  No tenderness or deformity of the femurs and tib-fib's. Left hip has significant pain on ROM and palpation.    Emergency Department Course     Imaging:  XR Chest Port 1 View   Final Result   IMPRESSION: Heart size and pulmonary vascularity normal. Linear strand of fibrosis or  atelectasis left base, lungs otherwise clear.      Lumbar spine CT w/o contrast   Final Result   IMPRESSION:   1.  No acute lumbar spine fracture.   2.  Circumferential bulge with severe canal stenosis at L4-L5.   3.  Cholelithiasis.   4.  Suggestion of a partially imaged uterine lesion which is also seen on 06/04/2022. Gynecological follow-up is recommended.      Head CT w/o contrast   Final Result   IMPRESSION:   1.  No acute intracranial process.   2.  5 mm inferiorly projecting right MCA bifurcation aneurysm. This would be better assessed by a CTA head.      XR Pelvis w Hip Right 1 View   Final Result   IMPRESSION: Acute nondisplaced minimally impacted subcapital right femoral neck fracture where there is focal cortical offset.       Bones are demineralized. Minimal degenerative changes right hip.      Contralateral left total hip replacement.      NOTE: ABNORMAL REPORT      THE DICTATION ABOVE DESCRIBES AN ABNORMALITY FOR WHICH FOLLOW-UP IS NEEDED.       XR Knee Right 3 Views   Final Result   IMPRESSION: No fracture. No effusion. Mild medial and lateral compartment narrowing.      XR Femur Right 2 Views    (Results Pending)      Report per radiology    Laboratory:  Labs Ordered and Resulted from Time of ED Arrival to Time of ED Departure   CBC WITH PLATELETS AND DIFFERENTIAL - Abnormal       Result Value    WBC Count 7.5      RBC Count 4.89      Hemoglobin 14.4      Hematocrit 44.4      MCV 91      MCH 29.4      MCHC 32.4      RDW 15.2 (*)     Platelet Count 194      % Neutrophils 70      % Lymphocytes 18      % Monocytes 10      % Eosinophils 1      % Basophils 1      % Immature Granulocytes 0      NRBCs per 100 WBC 0      Absolute Neutrophils 5.2      Absolute Lymphocytes 1.4      Absolute Monocytes 0.8      Absolute Eosinophils 0.1      Absolute Basophils 0.0      Absolute Immature Granulocytes 0.0      Absolute NRBCs 0.0     BASIC METABOLIC PANEL - Normal    Sodium 141      Potassium 3.9      Chloride 107       Carbon Dioxide (CO2) 27      Anion Gap 7      Urea Nitrogen 13      Creatinine 0.55      Calcium 8.9      Glucose 97      GFR Estimate 88            Emergency Department Course & Assessments:     Interventions:  Medications      oxyCODONE (ROXICODONE) tablet 5 mg ( Oral Auto Hold 1/16/23 1158)       Independent Interpretation (X-rays, CTs, rhythm strip):  Xrays of the hip and knee were independently reviewed by myself.    Consultations/Discussion of Management or Tests:     ED Course as of 01/16/23 1204   Sun Kevin 15, 2023   1620 I obtained history and examined the patient as noted above.     1808 I spoke with Dr. Inge MD, regarding the patient.     1821 I rechecked the patient and explained findings.     1932 I spoke with Dr. Mcguire, Orthopedics, regarding the patient.     1947 I spoke with Dr. Carranza, Hospitalist, who agreed to admit the patient.         Social Determinants of Health affecting care:  possible underlying dementia,      Disposition:  The patient was admitted to the hospital under the care of Dr. Penny.     Impression & Plan    Medical Decision Making:  This is an 87-year-old female that [place].  Physical exam findings are consistent with potential underlying hip fracture.  Due to the patient's underlying dementia and difficulty communicating I felt it was necessary to obtain CT imaging of her head and back.  There is no evidence of acute fractures of the lower spine or intracranial hemorrhage of the head.  Noted incidental findings of aneurysm and uterine lesion and gallstones.  These can be worked up with primary care.  At this time x-ray imaging independently reviewed by myself confirmed by radiology shows right hip fracture without any fractures of the right knee.  Dr. Mcguire orthopedics surgeon was consulted and agrees the patient requires operative intervention likely tomorrow.  Patient was admitted under Dr. Carranza hospitalist service for medical care and  perioperative treatment.  Patient's vital signs remained normal during her ED stay.  There is no further evidence of chest or abdominal injury.  Basic labs have been obtained.  Patient's pain was controlled with oxycodone.    Diagnosis:    ICD-10-CM    1. Closed displaced fracture of right femoral neck (H)  S72.001A Case Request: OPEN REDUCTION INTERNAL FIXATION, FRACTURE, FEMUR, PROXIMAL     Case Request: OPEN REDUCTION INTERNAL FIXATION, FRACTURE, FEMUR, PROXIMAL      2. Lesion of uterus  N85.9       3. Brain aneurysm  I67.1       4. Gall stones  K80.20            Discharge Medications:  Current Discharge Medication List           Scribe Disclosure:  ROSA DAWSON, am serving as a scribe at 4:20 PM on 1/15/2023 to document services personally performed by Edd Dawn PA-C based on my observations and the provider's statements to me.     1/15/2023   Edd Dawn PA-C Kruger, Jacob C, PA-C  01/16/23 1202

## 2023-01-15 NOTE — ED TRIAGE NOTES
Patient comes in with her son after a fall today.  Patient and her [place] at the mall and patient had a mechanical fall. She complains of right upper leg pain/hip pain.  She was able to walk afterwards.  She did not strike her head.  No blood thinners.  She has some dementia but is at baseline mentation.

## 2023-01-16 ENCOUNTER — APPOINTMENT (OUTPATIENT)
Dept: GENERAL RADIOLOGY | Facility: CLINIC | Age: 88
DRG: 482 | End: 2023-01-16
Attending: HOSPITALIST
Payer: MEDICARE

## 2023-01-16 ENCOUNTER — ANESTHESIA (OUTPATIENT)
Dept: SURGERY | Facility: CLINIC | Age: 88
DRG: 482 | End: 2023-01-16
Payer: MEDICARE

## 2023-01-16 ENCOUNTER — ANESTHESIA EVENT (OUTPATIENT)
Dept: SURGERY | Facility: CLINIC | Age: 88
DRG: 482 | End: 2023-01-16
Payer: MEDICARE

## 2023-01-16 ENCOUNTER — APPOINTMENT (OUTPATIENT)
Dept: GENERAL RADIOLOGY | Facility: CLINIC | Age: 88
DRG: 482 | End: 2023-01-16
Attending: STUDENT IN AN ORGANIZED HEALTH CARE EDUCATION/TRAINING PROGRAM
Payer: MEDICARE

## 2023-01-16 ENCOUNTER — APPOINTMENT (OUTPATIENT)
Dept: GENERAL RADIOLOGY | Facility: CLINIC | Age: 88
DRG: 482 | End: 2023-01-16
Attending: PHYSICIAN ASSISTANT
Payer: MEDICARE

## 2023-01-16 LAB
ABO/RH(D): NORMAL
ANTIBODY SCREEN: NEGATIVE
DEPRECATED CALCIDIOL+CALCIFEROL SERPL-MC: 28 UG/L (ref 20–75)
GLUCOSE BLDC GLUCOMTR-MCNC: 114 MG/DL (ref 70–99)
SARS-COV-2 RNA RESP QL NAA+PROBE: NEGATIVE
SPECIMEN EXPIRATION DATE: NORMAL

## 2023-01-16 PROCEDURE — 0QS604Z REPOSITION RIGHT UPPER FEMUR WITH INTERNAL FIXATION DEVICE, OPEN APPROACH: ICD-10-PCS | Performed by: STUDENT IN AN ORGANIZED HEALTH CARE EDUCATION/TRAINING PROGRAM

## 2023-01-16 PROCEDURE — 36415 COLL VENOUS BLD VENIPUNCTURE: CPT | Performed by: PHYSICIAN ASSISTANT

## 2023-01-16 PROCEDURE — C1713 ANCHOR/SCREW BN/BN,TIS/BN: HCPCS | Performed by: STUDENT IN AN ORGANIZED HEALTH CARE EDUCATION/TRAINING PROGRAM

## 2023-01-16 PROCEDURE — 73552 X-RAY EXAM OF FEMUR 2/>: CPT | Mod: RT

## 2023-01-16 PROCEDURE — 258N000003 HC RX IP 258 OP 636: Performed by: STUDENT IN AN ORGANIZED HEALTH CARE EDUCATION/TRAINING PROGRAM

## 2023-01-16 PROCEDURE — 93005 ELECTROCARDIOGRAM TRACING: CPT

## 2023-01-16 PROCEDURE — 999N000141 HC STATISTIC PRE-PROCEDURE NURSING ASSESSMENT: Performed by: STUDENT IN AN ORGANIZED HEALTH CARE EDUCATION/TRAINING PROGRAM

## 2023-01-16 PROCEDURE — 250N000013 HC RX MED GY IP 250 OP 250 PS 637: Performed by: HOSPITALIST

## 2023-01-16 PROCEDURE — 250N000009 HC RX 250: Performed by: ANESTHESIOLOGY

## 2023-01-16 PROCEDURE — 250N000011 HC RX IP 250 OP 636: Performed by: HOSPITALIST

## 2023-01-16 PROCEDURE — 250N000011 HC RX IP 250 OP 636: Performed by: STUDENT IN AN ORGANIZED HEALTH CARE EDUCATION/TRAINING PROGRAM

## 2023-01-16 PROCEDURE — 250N000011 HC RX IP 250 OP 636: Performed by: ANESTHESIOLOGY

## 2023-01-16 PROCEDURE — 999N000063 XR FEMUR PORT RIGHT 2 VIEWS: Mod: RT

## 2023-01-16 PROCEDURE — 250N000009 HC RX 250: Performed by: STUDENT IN AN ORGANIZED HEALTH CARE EDUCATION/TRAINING PROGRAM

## 2023-01-16 PROCEDURE — 250N000013 HC RX MED GY IP 250 OP 250 PS 637: Performed by: STUDENT IN AN ORGANIZED HEALTH CARE EDUCATION/TRAINING PROGRAM

## 2023-01-16 PROCEDURE — 86901 BLOOD TYPING SEROLOGIC RH(D): CPT | Performed by: PHYSICIAN ASSISTANT

## 2023-01-16 PROCEDURE — 250N000011 HC RX IP 250 OP 636: Performed by: NURSE ANESTHETIST, CERTIFIED REGISTERED

## 2023-01-16 PROCEDURE — 360N000084 HC SURGERY LEVEL 4 W/ FLUORO, PER MIN: Performed by: STUDENT IN AN ORGANIZED HEALTH CARE EDUCATION/TRAINING PROGRAM

## 2023-01-16 PROCEDURE — 120N000001 HC R&B MED SURG/OB

## 2023-01-16 PROCEDURE — 370N000017 HC ANESTHESIA TECHNICAL FEE, PER MIN: Performed by: STUDENT IN AN ORGANIZED HEALTH CARE EDUCATION/TRAINING PROGRAM

## 2023-01-16 PROCEDURE — 250N000009 HC RX 250: Performed by: NURSE ANESTHETIST, CERTIFIED REGISTERED

## 2023-01-16 PROCEDURE — 258N000003 HC RX IP 258 OP 636: Performed by: ANESTHESIOLOGY

## 2023-01-16 PROCEDURE — 258N000003 HC RX IP 258 OP 636: Performed by: NURSE ANESTHETIST, CERTIFIED REGISTERED

## 2023-01-16 PROCEDURE — C1769 GUIDE WIRE: HCPCS | Performed by: STUDENT IN AN ORGANIZED HEALTH CARE EDUCATION/TRAINING PROGRAM

## 2023-01-16 PROCEDURE — 999N000179 XR SURGERY CARM FLUORO LESS THAN 5 MIN W STILLS

## 2023-01-16 PROCEDURE — 250N000013 HC RX MED GY IP 250 OP 250 PS 637: Performed by: PHYSICIAN ASSISTANT

## 2023-01-16 PROCEDURE — 710N000009 HC RECOVERY PHASE 1, LEVEL 1, PER MIN: Performed by: STUDENT IN AN ORGANIZED HEALTH CARE EDUCATION/TRAINING PROGRAM

## 2023-01-16 PROCEDURE — 99232 SBSQ HOSP IP/OBS MODERATE 35: CPT | Performed by: HOSPITALIST

## 2023-01-16 PROCEDURE — 272N000001 HC OR GENERAL SUPPLY STERILE: Performed by: STUDENT IN AN ORGANIZED HEALTH CARE EDUCATION/TRAINING PROGRAM

## 2023-01-16 DEVICE — IMPLANTABLE DEVICE: Type: IMPLANTABLE DEVICE | Site: HIP | Status: FUNCTIONAL

## 2023-01-16 RX ORDER — BISACODYL 10 MG
10 SUPPOSITORY, RECTAL RECTAL DAILY PRN
Status: DISCONTINUED | OUTPATIENT
Start: 2023-01-16 | End: 2023-01-20 | Stop reason: HOSPADM

## 2023-01-16 RX ORDER — CEFAZOLIN SODIUM 2 G/100ML
2 INJECTION, SOLUTION INTRAVENOUS
Status: COMPLETED | OUTPATIENT
Start: 2023-01-16 | End: 2023-01-16

## 2023-01-16 RX ORDER — KETAMINE HYDROCHLORIDE 10 MG/ML
INJECTION INTRAMUSCULAR; INTRAVENOUS PRN
Status: DISCONTINUED | OUTPATIENT
Start: 2023-01-16 | End: 2023-01-16

## 2023-01-16 RX ORDER — MAGNESIUM HYDROXIDE 1200 MG/15ML
LIQUID ORAL PRN
Status: DISCONTINUED | OUTPATIENT
Start: 2023-01-16 | End: 2023-01-16 | Stop reason: HOSPADM

## 2023-01-16 RX ORDER — HYDROMORPHONE HCL IN WATER/PF 6 MG/30 ML
0.2 PATIENT CONTROLLED ANALGESIA SYRINGE INTRAVENOUS EVERY 5 MIN PRN
Status: DISCONTINUED | OUTPATIENT
Start: 2023-01-16 | End: 2023-01-16 | Stop reason: HOSPADM

## 2023-01-16 RX ORDER — NALOXONE HYDROCHLORIDE 0.4 MG/ML
0.4 INJECTION, SOLUTION INTRAMUSCULAR; INTRAVENOUS; SUBCUTANEOUS
Status: DISCONTINUED | OUTPATIENT
Start: 2023-01-16 | End: 2023-01-20 | Stop reason: HOSPADM

## 2023-01-16 RX ORDER — PROPOFOL 10 MG/ML
INJECTION, EMULSION INTRAVENOUS CONTINUOUS PRN
Status: DISCONTINUED | OUTPATIENT
Start: 2023-01-16 | End: 2023-01-16

## 2023-01-16 RX ORDER — ATORVASTATIN CALCIUM 20 MG/1
20 TABLET, FILM COATED ORAL DAILY
Status: DISCONTINUED | OUTPATIENT
Start: 2023-01-16 | End: 2023-01-20 | Stop reason: HOSPADM

## 2023-01-16 RX ORDER — FENTANYL CITRATE 0.05 MG/ML
50 INJECTION, SOLUTION INTRAMUSCULAR; INTRAVENOUS ONCE
Status: COMPLETED | OUTPATIENT
Start: 2023-01-16 | End: 2023-01-16

## 2023-01-16 RX ORDER — SODIUM CHLORIDE, SODIUM LACTATE, POTASSIUM CHLORIDE, CALCIUM CHLORIDE 600; 310; 30; 20 MG/100ML; MG/100ML; MG/100ML; MG/100ML
INJECTION, SOLUTION INTRAVENOUS CONTINUOUS
Status: DISCONTINUED | OUTPATIENT
Start: 2023-01-16 | End: 2023-01-16 | Stop reason: HOSPADM

## 2023-01-16 RX ORDER — FENTANYL CITRATE 0.05 MG/ML
50 INJECTION, SOLUTION INTRAMUSCULAR; INTRAVENOUS EVERY 5 MIN PRN
Status: DISCONTINUED | OUTPATIENT
Start: 2023-01-16 | End: 2023-01-16 | Stop reason: HOSPADM

## 2023-01-16 RX ORDER — OXYCODONE HYDROCHLORIDE 5 MG/1
5 TABLET ORAL EVERY 4 HOURS PRN
Status: DISCONTINUED | OUTPATIENT
Start: 2023-01-16 | End: 2023-01-20 | Stop reason: HOSPADM

## 2023-01-16 RX ORDER — LIDOCAINE 40 MG/G
CREAM TOPICAL
Status: DISCONTINUED | OUTPATIENT
Start: 2023-01-16 | End: 2023-01-20 | Stop reason: HOSPADM

## 2023-01-16 RX ORDER — ONDANSETRON 4 MG/1
4 TABLET, ORALLY DISINTEGRATING ORAL EVERY 6 HOURS PRN
Status: DISCONTINUED | OUTPATIENT
Start: 2023-01-16 | End: 2023-01-20 | Stop reason: HOSPADM

## 2023-01-16 RX ORDER — HALOPERIDOL 5 MG/ML
1 INJECTION INTRAMUSCULAR
Status: DISCONTINUED | OUTPATIENT
Start: 2023-01-16 | End: 2023-01-16 | Stop reason: HOSPADM

## 2023-01-16 RX ORDER — BUPIVACAINE HYDROCHLORIDE 7.5 MG/ML
INJECTION, SOLUTION INTRASPINAL
Status: COMPLETED | OUTPATIENT
Start: 2023-01-16 | End: 2023-01-16

## 2023-01-16 RX ORDER — AMOXICILLIN 250 MG
1 CAPSULE ORAL 2 TIMES DAILY
Status: DISCONTINUED | OUTPATIENT
Start: 2023-01-16 | End: 2023-01-20 | Stop reason: HOSPADM

## 2023-01-16 RX ORDER — HYDROMORPHONE HCL IN WATER/PF 6 MG/30 ML
0.4 PATIENT CONTROLLED ANALGESIA SYRINGE INTRAVENOUS
Status: DISCONTINUED | OUTPATIENT
Start: 2023-01-16 | End: 2023-01-20 | Stop reason: HOSPADM

## 2023-01-16 RX ORDER — ONDANSETRON 2 MG/ML
4 INJECTION INTRAMUSCULAR; INTRAVENOUS EVERY 30 MIN PRN
Status: DISCONTINUED | OUTPATIENT
Start: 2023-01-16 | End: 2023-01-16 | Stop reason: HOSPADM

## 2023-01-16 RX ORDER — CHOLECALCIFEROL (VITAMIN D3) 50 MCG
50 TABLET ORAL DAILY
Status: DISCONTINUED | OUTPATIENT
Start: 2023-01-16 | End: 2023-01-16

## 2023-01-16 RX ORDER — HYDROMORPHONE HCL IN WATER/PF 6 MG/30 ML
0.2 PATIENT CONTROLLED ANALGESIA SYRINGE INTRAVENOUS
Status: DISCONTINUED | OUTPATIENT
Start: 2023-01-16 | End: 2023-01-20 | Stop reason: HOSPADM

## 2023-01-16 RX ORDER — ONDANSETRON 2 MG/ML
4 INJECTION INTRAMUSCULAR; INTRAVENOUS EVERY 6 HOURS PRN
Status: DISCONTINUED | OUTPATIENT
Start: 2023-01-16 | End: 2023-01-20 | Stop reason: HOSPADM

## 2023-01-16 RX ORDER — NALOXONE HYDROCHLORIDE 0.4 MG/ML
0.2 INJECTION, SOLUTION INTRAMUSCULAR; INTRAVENOUS; SUBCUTANEOUS
Status: DISCONTINUED | OUTPATIENT
Start: 2023-01-16 | End: 2023-01-20 | Stop reason: HOSPADM

## 2023-01-16 RX ORDER — TRANEXAMIC ACID 10 MG/ML
1 INJECTION, SOLUTION INTRAVENOUS CONTINUOUS
Status: DISCONTINUED | OUTPATIENT
Start: 2023-01-16 | End: 2023-01-16 | Stop reason: HOSPADM

## 2023-01-16 RX ORDER — ACETAMINOPHEN 325 MG/1
975 TABLET ORAL EVERY 8 HOURS
Status: DISPENSED | OUTPATIENT
Start: 2023-01-16 | End: 2023-01-19

## 2023-01-16 RX ORDER — HYDROMORPHONE HCL IN WATER/PF 6 MG/30 ML
0.4 PATIENT CONTROLLED ANALGESIA SYRINGE INTRAVENOUS EVERY 5 MIN PRN
Status: DISCONTINUED | OUTPATIENT
Start: 2023-01-16 | End: 2023-01-16 | Stop reason: HOSPADM

## 2023-01-16 RX ORDER — OXYCODONE HYDROCHLORIDE 5 MG/1
5 TABLET ORAL EVERY 4 HOURS PRN
Status: DISCONTINUED | OUTPATIENT
Start: 2023-01-16 | End: 2023-01-16 | Stop reason: HOSPADM

## 2023-01-16 RX ORDER — ACETAMINOPHEN 325 MG/1
650 TABLET ORAL EVERY 4 HOURS PRN
Status: DISCONTINUED | OUTPATIENT
Start: 2023-01-19 | End: 2023-01-20 | Stop reason: HOSPADM

## 2023-01-16 RX ORDER — PROCHLORPERAZINE MALEATE 5 MG
5 TABLET ORAL EVERY 6 HOURS PRN
Status: DISCONTINUED | OUTPATIENT
Start: 2023-01-16 | End: 2023-01-20 | Stop reason: HOSPADM

## 2023-01-16 RX ORDER — OXYCODONE HYDROCHLORIDE 5 MG/1
10 TABLET ORAL EVERY 4 HOURS PRN
Status: DISCONTINUED | OUTPATIENT
Start: 2023-01-16 | End: 2023-01-20 | Stop reason: HOSPADM

## 2023-01-16 RX ORDER — ONDANSETRON 2 MG/ML
INJECTION INTRAMUSCULAR; INTRAVENOUS PRN
Status: DISCONTINUED | OUTPATIENT
Start: 2023-01-16 | End: 2023-01-16

## 2023-01-16 RX ORDER — ONDANSETRON 4 MG/1
4 TABLET, ORALLY DISINTEGRATING ORAL EVERY 30 MIN PRN
Status: DISCONTINUED | OUTPATIENT
Start: 2023-01-16 | End: 2023-01-16 | Stop reason: HOSPADM

## 2023-01-16 RX ORDER — FENTANYL CITRATE 0.05 MG/ML
25 INJECTION, SOLUTION INTRAMUSCULAR; INTRAVENOUS EVERY 5 MIN PRN
Status: DISCONTINUED | OUTPATIENT
Start: 2023-01-16 | End: 2023-01-16 | Stop reason: HOSPADM

## 2023-01-16 RX ORDER — DEXAMETHASONE SODIUM PHOSPHATE 10 MG/ML
INJECTION, SOLUTION INTRAMUSCULAR; INTRAVENOUS PRN
Status: DISCONTINUED | OUTPATIENT
Start: 2023-01-16 | End: 2023-01-16

## 2023-01-16 RX ORDER — POLYETHYLENE GLYCOL 3350 17 G/17G
17 POWDER, FOR SOLUTION ORAL DAILY
Status: DISCONTINUED | OUTPATIENT
Start: 2023-01-17 | End: 2023-01-20 | Stop reason: HOSPADM

## 2023-01-16 RX ORDER — TRANEXAMIC ACID 650 MG/1
1950 TABLET ORAL ONCE
Status: COMPLETED | OUTPATIENT
Start: 2023-01-16 | End: 2023-01-16

## 2023-01-16 RX ORDER — EPHEDRINE SULFATE 50 MG/ML
INJECTION, SOLUTION INTRAMUSCULAR; INTRAVENOUS; SUBCUTANEOUS PRN
Status: DISCONTINUED | OUTPATIENT
Start: 2023-01-16 | End: 2023-01-16

## 2023-01-16 RX ORDER — CEFAZOLIN SODIUM 2 G/100ML
2 INJECTION, SOLUTION INTRAVENOUS SEE ADMIN INSTRUCTIONS
Status: DISCONTINUED | OUTPATIENT
Start: 2023-01-16 | End: 2023-01-16 | Stop reason: HOSPADM

## 2023-01-16 RX ORDER — CEFAZOLIN SODIUM 1 G/3ML
1 INJECTION, POWDER, FOR SOLUTION INTRAMUSCULAR; INTRAVENOUS EVERY 8 HOURS
Status: COMPLETED | OUTPATIENT
Start: 2023-01-16 | End: 2023-01-17

## 2023-01-16 RX ORDER — BUPIVACAINE HYDROCHLORIDE AND EPINEPHRINE 5; 5 MG/ML; UG/ML
INJECTION, SOLUTION PERINEURAL
Status: COMPLETED | OUTPATIENT
Start: 2023-01-16 | End: 2023-01-16

## 2023-01-16 RX ORDER — SODIUM CHLORIDE, SODIUM LACTATE, POTASSIUM CHLORIDE, CALCIUM CHLORIDE 600; 310; 30; 20 MG/100ML; MG/100ML; MG/100ML; MG/100ML
INJECTION, SOLUTION INTRAVENOUS CONTINUOUS
Status: DISCONTINUED | OUTPATIENT
Start: 2023-01-16 | End: 2023-01-20 | Stop reason: HOSPADM

## 2023-01-16 RX ORDER — FENTANYL CITRATE 50 UG/ML
INJECTION, SOLUTION INTRAMUSCULAR; INTRAVENOUS PRN
Status: DISCONTINUED | OUTPATIENT
Start: 2023-01-16 | End: 2023-01-16

## 2023-01-16 RX ADMIN — PROPOFOL 75 MCG/KG/MIN: 10 INJECTION, EMULSION INTRAVENOUS at 13:50

## 2023-01-16 RX ADMIN — BUPIVACAINE HYDROCHLORIDE AND EPINEPHRINE BITARTRATE 20 ML: 5; .005 INJECTION, SOLUTION PERINEURAL at 12:31

## 2023-01-16 RX ADMIN — Medication 10 MG: at 13:40

## 2023-01-16 RX ADMIN — ATORVASTATIN CALCIUM 20 MG: 20 TABLET, FILM COATED ORAL at 18:36

## 2023-01-16 RX ADMIN — DEXAMETHASONE SODIUM PHOSPHATE 10 MG: 10 INJECTION, SOLUTION INTRAMUSCULAR; INTRAVENOUS at 14:22

## 2023-01-16 RX ADMIN — PHENYLEPHRINE HYDROCHLORIDE 100 MCG: 10 INJECTION INTRAVENOUS at 14:11

## 2023-01-16 RX ADMIN — SODIUM CHLORIDE, POTASSIUM CHLORIDE, SODIUM LACTATE AND CALCIUM CHLORIDE: 600; 310; 30; 20 INJECTION, SOLUTION INTRAVENOUS at 13:34

## 2023-01-16 RX ADMIN — ACETAMINOPHEN 975 MG: 325 TABLET, FILM COATED ORAL at 18:36

## 2023-01-16 RX ADMIN — TRANEXAMIC ACID 650 MG: 650 TABLET ORAL at 12:34

## 2023-01-16 RX ADMIN — MIDAZOLAM 0.5 MG: 1 INJECTION INTRAMUSCULAR; INTRAVENOUS at 13:39

## 2023-01-16 RX ADMIN — PHENYLEPHRINE HYDROCHLORIDE 0.3 MCG/KG/MIN: 10 INJECTION INTRAVENOUS at 14:08

## 2023-01-16 RX ADMIN — SODIUM CHLORIDE, POTASSIUM CHLORIDE, SODIUM LACTATE AND CALCIUM CHLORIDE: 600; 310; 30; 20 INJECTION, SOLUTION INTRAVENOUS at 14:55

## 2023-01-16 RX ADMIN — Medication 10 MG: at 13:50

## 2023-01-16 RX ADMIN — Medication 50 MCG: at 08:28

## 2023-01-16 RX ADMIN — PHENYLEPHRINE HYDROCHLORIDE 100 MCG: 10 INJECTION INTRAVENOUS at 14:06

## 2023-01-16 RX ADMIN — PHENYLEPHRINE HYDROCHLORIDE 100 MCG: 10 INJECTION INTRAVENOUS at 13:53

## 2023-01-16 RX ADMIN — TRANEXAMIC ACID 1 G: 10 INJECTION, SOLUTION INTRAVENOUS at 12:33

## 2023-01-16 RX ADMIN — BUPIVACAINE HYDROCHLORIDE IN DEXTROSE 1.4 ML: 7.5 INJECTION, SOLUTION SUBARACHNOID at 13:45

## 2023-01-16 RX ADMIN — FENTANYL CITRATE 25 MCG: 0.05 INJECTION, SOLUTION INTRAMUSCULAR; INTRAVENOUS at 12:39

## 2023-01-16 RX ADMIN — PHENYLEPHRINE HYDROCHLORIDE 100 MCG: 10 INJECTION INTRAVENOUS at 14:21

## 2023-01-16 RX ADMIN — ONDANSETRON 4 MG: 2 INJECTION INTRAMUSCULAR; INTRAVENOUS at 13:50

## 2023-01-16 RX ADMIN — Medication 5 MG: at 14:31

## 2023-01-16 RX ADMIN — PHENYLEPHRINE HYDROCHLORIDE 100 MCG: 10 INJECTION INTRAVENOUS at 14:05

## 2023-01-16 RX ADMIN — CEFAZOLIN 1 G: 1 INJECTION, POWDER, FOR SOLUTION INTRAMUSCULAR; INTRAVENOUS at 20:36

## 2023-01-16 RX ADMIN — CEFAZOLIN SODIUM 2 G: 2 INJECTION, SOLUTION INTRAVENOUS at 13:44

## 2023-01-16 RX ADMIN — FENTANYL CITRATE 25 MCG: 50 INJECTION, SOLUTION INTRAMUSCULAR; INTRAVENOUS at 13:50

## 2023-01-16 RX ADMIN — PHENYLEPHRINE HYDROCHLORIDE 100 MCG: 10 INJECTION INTRAVENOUS at 13:50

## 2023-01-16 RX ADMIN — LISINOPRIL 5 MG: 5 TABLET ORAL at 08:28

## 2023-01-16 RX ADMIN — SODIUM CHLORIDE, POTASSIUM CHLORIDE, SODIUM LACTATE AND CALCIUM CHLORIDE: 600; 310; 30; 20 INJECTION, SOLUTION INTRAVENOUS at 18:46

## 2023-01-16 RX ADMIN — PHENYLEPHRINE HYDROCHLORIDE 100 MCG: 10 INJECTION INTRAVENOUS at 14:13

## 2023-01-16 RX ADMIN — HYDROMORPHONE HYDROCHLORIDE 0.2 MG: 0.2 INJECTION, SOLUTION INTRAMUSCULAR; INTRAVENOUS; SUBCUTANEOUS at 04:35

## 2023-01-16 ASSESSMENT — ACTIVITIES OF DAILY LIVING (ADL)
ADLS_ACUITY_SCORE: 43
ADLS_ACUITY_SCORE: 43
ADLS_ACUITY_SCORE: 41
ADLS_ACUITY_SCORE: 43
ADLS_ACUITY_SCORE: 43
ADLS_ACUITY_SCORE: 39
ADLS_ACUITY_SCORE: 43
ADLS_ACUITY_SCORE: 41
ADLS_ACUITY_SCORE: 43
ADLS_ACUITY_SCORE: 43
ADLS_ACUITY_SCORE: 41
ADLS_ACUITY_SCORE: 43

## 2023-01-16 ASSESSMENT — ENCOUNTER SYMPTOMS
VOMITING: 0
HEADACHES: 0
NUMBNESS: 0
DYSRHYTHMIAS: 0
SPEECH DIFFICULTY: 1
ORTHOPNEA: 0
SEIZURES: 0
NECK PAIN: 0
NAUSEA: 0

## 2023-01-16 ASSESSMENT — LIFESTYLE VARIABLES: TOBACCO_USE: 1

## 2023-01-16 NOTE — ED NOTES
[facility]  ED Nurse Handoff Report    ED Chief complaint: Hip Pain      ED Diagnosis:   Final diagnoses:   Closed displaced fracture of right femoral neck (H)   Lesion of uterus   Brain aneurysm   Gall stones       Code Status: DNR / DNI    Allergies: No Known Allergies    Patient Story: Pt had a mechanical fall, witnessed by son.  C/o R hip pain. No head injury.  Hx of alzheimer's and possible dementia. Pt has baseline struggles with speech.  Focused Assessment:  R hip fx.  Given oxy for pain.  Patient has not tried to get out of bed on own.  Son at bedside.  Understands commands.     Treatments and/or interventions provided: pain meds  Patient's response to treatments and/or interventions: improved    To be done/followed up on inpatient unit:  n/a    Does this patient have any cognitive concerns?: Baseline dementia    Activity level - Baseline/Home:  Independent  Activity Level - Current:   bedrest    Patient's Preferred language: English   Needed?: No    Isolation: None  Infection: Not Applicable  Patient tested for COVID 19 prior to admission: NO  Bariatric?: No    Vital Signs:   Vitals:    01/15/23 1603   BP: 132/66   Pulse: 78   Resp: 20   Temp: 98.5  F (36.9  C)   TempSrc: Temporal   SpO2: 95%       Cardiac Rhythm:     Was the PSS-3 completed:  Y  What interventions are required if any?               Family Comments: son   OBS brochure/video discussed/provided to patient/family: N/A              Name of person given brochure if not patient: n/a              Relationship to patient: n/a    For the majority of the shift this patient's behavior was Green.   Behavioral interventions performed were =.    ED NURSE PHONE NUMBER: *[id number]

## 2023-01-16 NOTE — ANESTHESIA PROCEDURE NOTES
"Intrathecal injection Procedure Note    Pre-Procedure   Staff -        Anesthesiologist:  Scotty Santo MD       Performed By: anesthesiologist       Location: OR       Pre-Anesthestic Checklist: patient identified, IV checked, site marked, risks and benefits discussed, informed consent, monitors and equipment checked, pre-op evaluation, at physician/surgeon's request and post-op pain management  Timeout:       Correct Patient: Yes        Correct Procedure: Yes        Correct Site: Yes        Correct Position: Yes   Procedure Documentation  Procedure: intrathecal injection       Patient Position: sitting       Patient Prep/Sterile Barriers: sterile gloves, mask, patient draped       Skin prep: Betadine       Insertion Site: L3-4. (midline approach).       Spinal Needle Type: Ora-Jhonathan       Introducer used       Introducer: 20 G       # of attempts: 1 and  # of redirects:  0    Assessment/Narrative         Paresthesias: No.       CSF fluid: clear.    Medication(s) Administered   0.75% Hyperbaric Bupivacaine (Intrathecal) - Intrathecal   1.4 mL - 1/16/2023 1:45:00 PM   Comments:  12.5 mg 0.75% BUPIVACAINE WITH 8.25% DEXTROSE INJECTED. No paresthesia on injection. Patient tolerated the procedure well.      FOR [facility] ([facility]) ONLY:   Pain Team Contact information: please page the Pain Team Via [employer]. Search \"Pain\". During daytime hours, please page the attending first. At night please page the resident first.    "

## 2023-01-16 NOTE — OR NURSING
Spoke with OR pharmacist.  Pt took 1 TXA pill but is unable to swallow the other 2 pills.  Per pharmacist it is fine to switch to IV TXA even if patient has already had 1 pill PO.

## 2023-01-16 NOTE — SIGNIFICANT EVENT
"Significant Event Note    Time of event: 12:00 AM January 16, 2023    Description of event:  Paged and asked to review CXR.    \"IMPRESSION: Heart size and pulmonary vascularity normal. Linear strand of fibrosis or atelectasis left base, lungs otherwise clear.\"    Plan:  Aggressive IS    Pramod Varner MD    "

## 2023-01-16 NOTE — PHARMACY-ADMISSION MEDICATION HISTORY
Pharmacy Medication History  Admission medication history interview status for the 1/15/2023  admission is complete. See EPIC admission navigator for prior to admission medications     Location of Interview: Phone  Medication history sources: Patient's family/friend (son Alen)    Significant changes made to the medication list:  Removed duplicate eye drops.      In the past week, patient estimated taking medication this percent of the time: greater than 90%    Additional medication history information:   None    Medication reconciliation completed by provider prior to medication history? No    Time spent in this activity: 20 min    Prior to Admission medications    Medication Sig Last Dose Taking? Auth Provider Long Term End Date   acetaminophen (TYLENOL) 325 MG tablet Take 2 tablets (650 mg) by mouth every 4 hours as needed for other (For optimal non-opioid multimodal pain management to improve pain control.)  at prn Yes Ana Read PA-C     aspirin (ASA) 81 MG EC tablet Take 1 tablet (81 mg) by mouth daily 1/14/2023 Yes Ana Read PA-C     atorvastatin (LIPITOR) 40 MG tablet Take 0.5 tablets (20 mg) by mouth daily 1/14/2023 Yes Viri Akbar MD Yes    donepezil (ARICEPT) 5 MG tablet Take 1 tablet (5 mg) by mouth At Bedtime 1/14/2023 at hs Yes Viri Akbar MD     dorzolamide-timolol (COSOPT) 2-0.5 % ophthalmic solution Place 1 drop Into the left eye 2 times daily 1/15/2023 at am Yes Reported, Patient     latanoprost (XALATAN) 0.005 % ophthalmic solution instill 1 drop in both eyes at bedtime 1/14/2023 Yes Reported, Patient Yes    lisinopril (ZESTRIL) 10 MG tablet Take 1 tablet (10 mg) by mouth daily 1/14/2023 Yes Viri Akbar MD Yes    multivitamin (THERMEMS) TABS Take 1 tablet by mouth daily 1/14/2023 Yes Reported, Patient     polyethylene glycol (MIRALAX) 17 GM/Dose powder Take 17 g by mouth daily  Patient taking differently: Take 17 g by mouth daily as needed  at prn Yes  Viri Akbar MD     triamcinolone (KENALOG) 0.1 % external cream Apply topically 2 times daily as needed (eczema spots on legs)  at prn Yes Alejandro Ruff APRN CNP         The information provided in this note is only as accurate as the sources available at the time of update(s)

## 2023-01-16 NOTE — ED PROVIDER NOTES
ED ATTENDING PHYSICIAN NOTE:   I evaluated this patient in conjunction with Edd Dawn PA-C. I have participated in the care of the patient and personally performed key elements of the history, exam, and medical decision making.      HPI:   Khloe Sparks is a 87 year old female who [place] with [place] mall. Patient struggles with speech but is still able to communicate and comprehend questions (baseline). She states that she did not hit her head or lose conscious. Patient reports that she fell completely to the ground. She reports pain in her right hip and knee. She denies having any back pain. Family member notes that patient is not on blood thinners. Patient's family history includes Alzheimer and possibly dementia.      EXAM:    The patient is resting supine.  There is a fracture to the right femoral neck, though there is a warm foot with strong pulses and normal sensation.  No other evidence of trauma.  Reassuring vital signs.     MEDICAL DECISION MAKING/ASSESSMENT AND PLAN:    This patient presents with atypical femoral neck fracture after mechanical fall.  She underwent a the above evaluation and I was consulted for her care because of her fracture and need for admission.  I see no other indication for further work-up at this time.  My physician assistant spoke with both orthopedics and hospitalist service who admit the patient under observation status for repair of the hip.     DIAGNOSIS:     ICD-10-CM    1. Closed displaced fracture of right femoral neck (H)  S72.001A       2. Lesion of uterus  N85.9       3. Brain aneurysm  I67.1       4. Gall stones  K80.20         DISPOSITION:    Patient was admitted.       1/15/2023  [facility] EMERGENCY DEPT     Trierweiler, Chad A, MD  01/16/23 2508

## 2023-01-16 NOTE — PROGRESS NOTES
[facility]    Medicine Progress Note - Hospitalist Service    Date of Admission:  1/15/2023    Assessment & Plan   Khloe Sparks is a 87 year old  female with medical history of hypertension, dyslipidemia, Alzheimer's disease, possible dementia brought into the ED by her son after a fall.     Acute traumatic right femoral neck fracture  Status post fall unwitnessed likely mechanical  History of left femoral neck fracture due to mechanical fall 6/2022  Per report patient and [place] were at the mall, patient had fall that was witnessed.  Subsequently complained of right hip pain.  No report of striking her head on the ground. On exam multiple skin abrasions on knees, legs and arms.  Moving all extremities, strength 5/5 in all extremities except right lower extremity. Sodium 141, potassium 3.9, creatinine 0.55.  WBC 7.5, hemoglobin 15.4, platelets 194.  Blood glucose 97. X-ray right knee no fractures.  X-ray right hip and pelvis with acute nondisplaced minimally impacted subcapital right femoral neck fracture where there is focal cortical offset. CT head no acute intracranial process. CT lumbar spine no acute lumbar spine fracture.    Admitted to inpatient.    Orthopedic surgery consulted, appreciate their assistance.    S/p ORIF of the right femoral neck fracture with FNS system by Dr. Mcguire on 1/16/23.    Post-op management per orthopedics.    Defer DVT prophylaxis to orthopedics.    Pain control.    Needs outpatient bone health evaluation, vitamin D, TSH as outpatient.    Incentive spirometry.    PT/OT consults.    Care transitions consult.     Incidental right MCA bifurcation aneurysm  CT head with 5 mm inferiorly projecting right MCA bifurcation aneurysm.  Recommend CTA head.    No acute neurological deficit.  Speech slow for almost a year now.    Admitting hospitalist discussed with patient's son. Consider CTA head after surgery, prior to discharge.     Incidental cervical canal stenosis  at L4-L5  CT lumbar spine with circumferential bulge with severe canal stenosis at L4-L5.    Rehab evaluation after surgery.    Will need to follow-up with orthospine as outpatient.     Incidental cholelithiasis  CT imaging with cholelithiasis. No right upper quadrant tenderness.  No report of vomiting.    Follow liver enzymes.    Consider further work-up if symptomatic.     Incidental uterine lesion  CT with suggestion of a partially imaged uterine lesion which is also seen on 06/04/2022.    Admitting hospitalist discussed with patient's son, outpatient GYN follow-up.       Hypertension    Hold PTA aspirin.    Hold PTA lisinopril for now.    As needed IV hydralazine available.     History of Alzheimer's disease, dementia    Continue PTA donepezil.    Delirium precautions.    Minimize narcotic use.     Dyslipidemia    Continue PTA Lipitor.     Glaucoma    Continue PTA eyedrops.         Diet:   regular  DVT Prophylaxis: Defer to orthopedic service  Saab Catheter: Not present  Lines: None     Cardiac Monitoring: ACTIVE order. Indication: fall  Code Status: Full Code      Clinically Significant Risk Factors Present on Admission                  # Hypertension: home medication list includes antihypertensive(s)              Disposition Plan      Expected Discharge Date: 01/18/2023    Discharge Delays: Procedure Pending (enter procedure & time in comments)              Aric Guillen MD  Hospitalist Service  [facility]  Securely message with [employer] (more info)  Text page via [employer] Paging/Directory   ______________________________________________________________________    Interval History   Khloe Sparks was seen this afternoon in the PACU. She underwent ORIF of the right femoral neck fracture with FNS system by Dr. Mcguire earlier today. She was still groggy from anesthesia, but did not seem to have any significant pain, shortness of breath or nausea.    Physical Exam   Vital Signs: Temp:  99.9  F (37.7  C) Temp src: Temporal BP: 105/64 Pulse: 70   Resp: 13 SpO2: 100 % O2 Device: None (Room air) Oxygen Delivery: 3 LPM  Weight: 122 lbs 0 oz    Constitutional: awake, drowsy, cooperative, no apparent distress, laying in the PACU bed  Respiratory: no increased work of breathing, clear to auscultation bilaterally, no crackles or wheezing  Cardiovascular: regular rate and rhythm, normal S1 and S2, no murmur noted  GI: normal bowel sounds, soft, non-distended, non-tender  Skin: warm, dry  Musculoskeletal: no lower extremity pitting edema present  Neurologic: awake, drowsy, answers a few questions, follows commands, no movement or sensation in bilateral lower extremities yet    Medical Decision Making       **CLEAR ALL SELECTIONS**      Data     I have personally reviewed the following data over the past 24 hrs:    7.5  \   14.4   / 194     141 107 13 /  114 (H)   3.9 27 0.55 \

## 2023-01-16 NOTE — H&P
[facility]    History and Physical  Hospitalist    Khloe Sparks MRN# [id number]   Age: 87 year old YOB: 1935     Date of Admission:  1/15/2023    Primary care provider: Viri Akbar          Assessment and Plan:     Khloe Sparks is a 87 year old  female with medical history of hypertension, dyslipidemia, Alzheimer's disease, possible dementia brought into the ED by her son after a fall today.    Acute traumatic right femoral neck fracture.  Status post fall unwitnessed likely mechanical.  History of left femoral neck fracture due to mechanical fall 6/2022.  [place], patient had fall that was witnessed.  Subsequently complained of right hip pain.  No report of striking her head on the ground.  -On exam Multiple skin abrasions on knees, legs and arms.  Moving all extremities, strength 5/5 in all extremities except right lower extremity.  Sodium 141, potassium 3.9, creatinine 0.55.  WBC 7.5, hemoglobin 15.4, platelets 194.  Blood glucose 97  X-ray right knee no fractures.    X-ray right hip and pelvis Acute nondisplaced minimally impacted subcapital right femoral neck fracture where there is focal cortical offset.   CT head No acute intracranial process.  CT lumbar spine No acute lumbar spine fracture.  --- Admit to inpatient unit.  Telemetry monitoring overnight to rule out cardiac event.  EKG, chest x-ray ordered given fall/preop risk assessment.  Anticipated medical complications from procedure discussed with patient's son Alen over the telephone  Hold PTA aspirin  N.p.o. from midnight.  Bedrest.  Ortho surgery consult requested.  Tylenol for mild pain, oxycodone as needed for moderate pain, IV Dilaudid for severe pain.  Minimize narcotic use as able to.  As needed bowel meds.  Incentive spirometry.  Defer bone health, vitamin D, TSH to outpatient.    Incidental right MCA bifurcation aneurysm.  CT head with 5 mm inferiorly  projecting right MCA bifurcation aneurysm.  Recommend CTA head  No acute neurological deficit.  Speech slow for almost a year now.  Discussed with patient son will consider CTA head after surgery prior to discharge.    Incidental cervical canal stenosis at L4-L5.  CT lumbar spine with Circumferential bulge with severe canal stenosis at L4-L5.  Rehab evaluation after surgery.  Will need to follow-up with orthospine as outpatient.    Incidental cholelithiasis.  CT imaging with cholelithiasis.  No right upper quadrant tenderness.  No report of vomiting.  Follow liver enzymes.  Consider further work-up if symptomatic.    Incidental uterine lesion.  CT with Suggestion of a partially imaged uterine lesion which is also seen on 06/04/2022.   Discussed with patient's son, outpatient GYN follow-up.      Hypertension.  Hold PTA aspirin.  Decrease PTA lisinopril to 5 mg oral daily on the morning of surgery.  As needed IV hydralazine ordered.    History of Alzheimer's disease, dementia.  Continue PTA donepezil.  Delirium precautions.  Minimize narcotic use.    Dyslipidemia:   Continue PTA Lipitor.    Glaucoma:    Continue PTA eyedrops.      DVT Prophylaxis: SCDs.  Pharmacological DVT prophylaxis postsurgery.  Code Status: Patient's family would like DNR/DNI revoked for surgery.  Patient's son Alen recommends full code for this hospitalization    Disposition: Expected discharge in greater than 2 days. COVID-19 PCR ordered, will likely transition to TCU.  More than 70% of time spent in direct patient care, care coordination, patient counseling, and formalizing plan of care.     Discussed with patient and ED physician.  Updated patient's son Alen over the telephone.    Julian Carranza MD          Chief Complaint:     History is obtained from chart review, patient's son over the telephone.  Patient with Alzheimer's disease, slow speech -unable to provide history.    Khloe Sparks is a 87 year old  female with medical history  of hypertension, dyslipidemia, Alzheimer's disease, possible dementia brought into the ED by her son after a fall today.    [place] mall, patient had fall that was witnessed.  Subsequently complained of right hip pain.  No report of striking her head on the ground.  Patient's son reports Struggles with speech for almost a year now, at baseline.    Patient appears comfortable.  Denies any pain - in chest or back.  No shortness of breath.    No incontinence of bowel or bladder per report.    Denies any abdominal pain.  No vomiting.    On chart review noted to have left femoral neck fracture due to mechanical fall in 6/2022.  In ED vitals stable,  CVS S1-S2 heard.  Multiple skin abrasions on knees, legs and arms.  Moving all extremities, strength 5/5 in all extremities except right lower extremity.  Sodium 141, potassium 3.9, creatinine 0.55.  WBC 7.5, hemoglobin 15.4, platelets 194.  Blood glucose 97  X-ray right knee no fractures.    X-ray right hip and pelvis Acute nondisplaced minimally impacted subcapital right femoral neck fracture where there is focal cortical offset.   CT head .  No acute intracranial process.  5 mm inferiorly projecting right MCA bifurcation aneurysm. This would be better assessed by a CTA head.  CT lumbar spine No acute lumbar spine fracture.  2.  Circumferential bulge with severe canal stenosis at L4-L5.  3.  Cholelithiasis.  4.  Suggestion of a partially imaged uterine lesion which is also seen on 06/04/2022. Gynecological follow-up is recommended           Review of Systems:     Unable to obtain.  See HPI.    Medical History:     Past Medical History:   Diagnosis Date     Dyslipidemia      Hypertension         Surgical History:      Past Surgical History:   Procedure Laterality Date     HC REMOVE TONSILS/ADENOIDS,<13 Y/O      Description: Tonsillectomy With Adenoidectomy;  Recorded: 07/22/2010;     OPEN REDUCTION INTERNAL FIXATION HIP UNIPOLAR Left 6/5/2022     Procedure: LEFT Hip Hemiarthroplasty;  Surgeon: Chacho Dalton MD;  Location: [facility] OR     TN CLOSED RX DIST RAD/ULNA FX,MANIPUL      Description: Closed Treatment Of Fracture Of Distal Radius, Manipulation;  Proc Date: 2002;  Comments: fell from ladder     TN CLOSED RX MID HUMERUS FRACTURE      Description: Closed Treat Of Fracture Of The Humeral Shaft;  Proc Date: 2007;  Comments: fell from wall             Social History:      Social History     Tobacco Use     Smoking status: Former     Types: Cigarettes     Quit date: 1975     Years since quittin.4     Smokeless tobacco: Never     Tobacco comments:     quit     Substance Use Topics     Alcohol use: Yes     Comment: Alcoholic Drinks/day: glass per week             Family History:     Family History   Problem Relation Age of Onset     Breast Cancer Sister          of this but had MS as well     Alzheimer Disease Sister          age 79     Multiple Sclerosis Mother          age 80     Pancreatic Cancer Father          age 70     Cancer Brother         laryngeal cancer  in his 60s             Allergies:   No Known Allergies          Medications:   Home meds reviewed          Physical Exam      Vital Signs with Ranges  Temp:  [98.5  F (36.9  C)] 98.5  F (36.9  C)  Pulse:  [78] 78  Resp:  [20] 20  BP: (132)/(66) 132/66  SpO2:  [95 %] 95 %    PHYSICAL EXAM  GENERAL: Patient is in no distress.  Awake, slow to respond.  HEENT: Oropharynx pink, moist.  Extraocular muscle movements intact.  HEART: Regular rate and rhythm. S1S2. No murmurs  LUNGS: Clear to auscultation bilaterally. No expiratory wheeze.  Respirations unlabored  ABDOMEN: Soft, no abdominal tenderness, bowel sounds heard   NEURO: Cranial nerves grossly intact, moving all extremities.  EXTREMITIES: No pedal edema.  SKIN: Warm, dry. No rash   PSYCHIATRY Cooperative         Data:   All new lab and imaging data was reviewed.

## 2023-01-16 NOTE — OP NOTE
[facility]  Operative Note  Cephalomedullary Nailing      Khloe Sparks MRN# [id number]   YOB: 1935  Procedure Date:  1/16/2023  Age: 87 year old     PREOPERATIVE DIAGNOSIS:     1.  Valgus impacted Right femoral neck fracture       POSTOPERATIVE DIAGNOSES:     1.  Same       PROCEDURE PERFORMED: ORIF of the right femoral neck fracture with FNS system     SURGEON:  DIANA KAHN MD      ASSISTANT: Guillermo Phipps who was critical for positioning patient, helping obtain reduction, retraction during exposure and implant placement, as well as closure.     ANESTHESIA:  General.      ESTIMATED BLOOD LOSS:  100 mL      IMPLANTS USED:    Implant Name Type Inv. Item Serial No.  Lot No. LRB No. Used Action   PLATE BN 130D TI NIOBIUM AL 2 HL AU FEM NK SYS 04.268.000S - [id number] Metallic Hardware/[employer] PLATE BN 130D TI NIOBIUM AL 2 HL AU FEM NK SYS 04.268.000S  [employer]- [id number] Right 1 Implanted   SCREW BN 80MM ANTIROTATE STRL FEM NK SYS 04.168.480S - [id number] Metallic Hardware/[employer] SCREW BN 80MM ANTIROTATE STRL FEM NK SYS 04.168.480S  [employer]- [id number] Right 1 Implanted   BOLT ORTH 80MM FEM NK SYS STRL 04.168.280S - [id number] Metallic Hardware/[employer] BOLT ORTH 80MM FEM NK SYS STRL 04.168.280S  [employer]- [id number] Right 1 Implanted   5.0mm x 42mm Locking Screw     [id number] Right 1 Implanted   SCREW BN 38MM 5MM TI ST LCK STRDR T25 STRL - [id number] Metallic Hardware/[employer] SCREW BN 38MM 5MM TI ST LCK STRDR T25 STRL  [employer]- [id number] Right 1 Implanted        FINDINGS:  Khloe Sparks is a 87 year old-year-old female who sustained the above injury after a mechanical ground level fall.  A long discussion had regarding the nature of hip fractures, risks related to that and surgery discussed, included but not limited to bleeding, infection, damage to surrounding neurovascular structures, malunion, delayed union, nonunion, need for additional  surgeries, blood clots that go to the heart, lung or brain, anesthetic complications or even death.  Discussed hip fracture mortality rates.  The patient and the family wished to proceed and consent signed.      DESCRIPTION OF PROCEDURE:  The patient identified in the preoperative holding area per hospital policy and the correct operative site was marked.  General anesthesia induced, placed onto the [place] fracture table legs positioned in a scissor position.  All bony prominences well padded.  Slight traction and internal rotation reduced the fracture.  Chlorhexidine pre-scrub, ChloraPrep, standard drape.   Prior to incision, a critical pause was observed to identify the correct patient, correct procedure, site and side of surgery, implant availability and that appropriate imaging studies were available. I also confirmed that the patient received appropriate pre-operative prophylactic antibiotics.All in the room agreed to proceed.       A lateral incision was made over lying the right femoral shaft.  This was site dissected down to bone.  The guidewire was then inserted and the appropriate starting point slightly inferior to the center on the AP and center on the head neck in the AP and lateral.  The guidewire was then measured.  The appropriate sized screw/plate was then placed. Distal interlocks placed.  Final x-rays showed appropriate reduction, safe position of the implants. Thoroughly irrigated wounds, closed deep layers, 0 Vicryl, 2-0 Vicryl in subcutaneous and staples for skin.  Sterile dressings applied. Patient was then transported to the PACU in good condition. All counts at the end of the case were correct.      POSTOPERATIVE PLAN:   1.  Weightbearing as tolerated with a walker x6 weeks.   2.  Deep venous thrombosis prophylaxis with aspirin enteric-coated 325 mg daily x6 weeks.   3.  Ancef x 24 hours  4.  Osteoporosis workup including calcium, vitamin D supplementation and followup with primary care doctor  for additional treatment.   5.  Physical therapy.   6.  Pain control.  Minimize narcotics.      POSTOPERATIVE PLAN:  At two weeks, the staples can be removed. If all is going well, first followup could be 6 weeks with Dr. Mcguire [facility], with 2 views of the right femur, sooner if there is any difficulty.      DIANA MCGUIRE MD

## 2023-01-16 NOTE — PROVIDER NOTIFICATION
Hospitalist paged regarding CXR result available. Initial education, proper use of IS provided to pt.  Will continue to monitor.

## 2023-01-16 NOTE — ANESTHESIA PROCEDURE NOTES
Fascia iliaca Procedure Note    Pre-Procedure   Staff -        Anesthesiologist:  Ana Rios MD       Performed By: anesthesiologist       Location: pre-op       Procedure Start/Stop Times: 1/16/2023 12:31 PM and 1/16/2023 12:34 PM       Pre-Anesthestic Checklist: patient identified, IV checked, site marked, risks and benefits discussed, informed consent, monitors and equipment checked, pre-op evaluation, at physician/surgeon's request and post-op pain management  Timeout:       Correct Patient: Yes        Correct Procedure: Yes        Correct Site: Yes        Correct Position: Yes        Correct Laterality: Yes        Site Marked: Yes  Procedure Documentation  Procedure: Fascia iliaca       Diagnosis: RIGHT HIP FRACTURE       Laterality: right       Patient Position: supine       Patient Prep/Sterile Barriers: mask       Skin prep: Chloraprep       Local skin infiltrated with 2 mL of 1% lidocaine.        Needle Type: short bevel       Needle Gauge: 21.        Needle Length (Inches): 3.13        Ultrasound guided       1. Ultrasound was used to identify targeted nerve, plexus, vascular marker, or fascial plane and place a needle adjacent to it in real-time.       2. Ultrasound was used to visualize the spread of anesthetic in close proximity to the above referenced structure.       3. A permanent image is entered into the patient's record.       4. The visualized anatomic structures appeared normal.       5. There were no apparent abnormal pathologic findings.    Assessment/Narrative         The placement was negative for: blood aspirated and painful injection       Paresthesias: No.       Bolus given via needle..        Secured via.        Insertion/Infusion Method: Single Shot       Complications: none    Medication(s) Administered   Bupivacaine 0.5% w/ 1:200K Epi (Other) - Other   20 mL - 1/16/2023 12:31:00 PM  Medication Administration Time: 1/16/2023 12:31 PM      FOR [facility] ([facility]) ONLY:    "Pain Team Contact information: please page the Pain Team Via [facility]. Search \"Pain\". During daytime hours, please page the attending first. At night please page the resident first.    "

## 2023-01-16 NOTE — ANESTHESIA CARE TRANSFER NOTE
Patient: Khloe Sparks    Procedure: Procedure(s):  OPEN REDUCTION INTERNAL FIXATION, FRACTURE, FEMUR, PROXIMAL       Diagnosis: Closed displaced fracture of right femoral neck (H) [S72.001A]  Diagnosis Additional Information: No value filed.    Anesthesia Type:   Spinal     Note:    Oropharynx: oropharynx clear of all foreign objects  Level of Consciousness: awake  Oxygen Supplementation: face mask  Level of Supplemental Oxygen (L/min / FiO2): 10  Independent Airway: airway patency satisfactory and stable  Dentition: dentition unchanged  Vital Signs Stable: post-procedure vital signs reviewed and stable  Report to RN Given: handoff report given  Patient transferred to: PACU    Handoff Report: Identifed the Patient, Identified the Reponsible Provider, Reviewed the pertinent medical history, Discussed the surgical course, Reviewed Intra-OP anesthesia mangement and issues during anesthesia, Set expectations for post-procedure period and Allowed opportunity for questions and acknowledgement of understanding      Vitals:  Vitals Value Taken Time   BP 89/55 01/16/23 1513   Temp     Pulse 65 01/16/23 1514   Resp 13 01/16/23 1514   SpO2 100 % 01/16/23 1514   Vitals shown include unvalidated device data.    Electronically Signed By: RAVINDRA Ko CRNA  January 16, 2023  3:15 PM

## 2023-01-16 NOTE — PROGRESS NOTES
RECEIVING UNIT ED HANDOFF REVIEW    ED Nurse Handoff Report was reviewed by: Curly Mcqueen RN on January 15, 2023 at 8:24 PM

## 2023-01-16 NOTE — ANESTHESIA POSTPROCEDURE EVALUATION
Patient: Khloe Sparks    Procedure: Procedure(s):  OPEN REDUCTION INTERNAL FIXATION, FRACTURE, FEMUR, PROXIMAL       Anesthesia Type:  Spinal    Note:     Postop Pain Control: Uneventful            Sign Out: Well controlled pain   PONV: No   Neuro/Psych: Uneventful            Sign Out: Acceptable/Baseline neuro status   Airway/Respiratory: Uneventful            Sign Out: Acceptable/Baseline resp. status   CV/Hemodynamics: Uneventful            Sign Out: Acceptable CV status; No obvious hypovolemia; No obvious fluid overload   Other NRE: NONE   DID A NON-ROUTINE EVENT OCCUR? No           Last vitals:  Vitals Value Taken Time   BP 86/55 01/16/23 1520   Temp     Pulse 67 01/16/23 1522   Resp 13 01/16/23 1522   SpO2 100 % 01/16/23 1522   Vitals shown include unvalidated device data.    Electronically Signed By: Scotty Santo MD  January 16, 2023  3:23 PM

## 2023-01-16 NOTE — CONSULTS
Orthopedic Surgery Consult / History and Physical    Khloe Sparks MRN# [id number]   Age: 87 year old YOB: 1935     Date of Admission:   1/15/2023  Date of Consult: 01/16/23   Location:    [facility]             Assessment and Plan:   Assessment:  Khloe Sparks is a 87 year old female hypertension, dyslipidemia, Alzheimer's disease who presented to the emergency department on January 15, 2023 [place]  This fall was witnessed.  Patient complained of right hip pain after injury.  She did not have any antecedent hip pain.  She uses a walker on occasion. Radiographs in the emergency department demonstrated the patient had a right femoral neck fracture.  Orthopedics was consulted.    Plan:    1.Plan for operative fixation of the right hip today  2.Appreciate Medicine for preoperative risk stratification of medical co-morbidities.  2.NPO   3.EKG, CBC, BMP, PT/INR, Type and Screen, COVID screening  4.Ancef on call to OR. If true penicillin allergy exists, then clindamycin/vancomycin  5.Saab  6.Anticoagulation reversal   7.Pain control: Per primary. Ice/elevate extremity. Tylenol ATC, Oxycodone prn, and dilaudid prn for BT.   8.Activity: Bedrest  9.Weightbearing:  NWB RLE  10.DVT prophylaxis: Hold pre-operatively           Consult Reason:   Acute traumatic hip fracture           Physical Exam:     Patient is in no acute distress.  Patient is a poor historian.    Conjunctiva are clear.  Nonlabored breathing.    Gait: Not tested  Respiratory: Respirations unlabored on RA  Abd: soft, NT, ND  RLE: Able to move all extremities.  Tender to palpation around the right hip.  No significant joint effusion or swelling is noted.  She does have some abrasions to the lower extremities.  No significant ecchymosis noted.  Unable to reliably test sensation.  Distal pulses are palpable.           Data:   Imaging:                                                                    IMPRESSION: Acute nondisplaced minimally impacted subcapital right femoral neck fracture where there is focal cortical offset.      Bones are demineralized. Minimal degenerative changes right hip.     Contralateral left total hip replacement.         Greg Mcguire MD  Orthopaedic Spine Surgery  [facility]

## 2023-01-16 NOTE — ANESTHESIA PREPROCEDURE EVALUATION
Anesthesia Pre-Procedure Evaluation    Patient: Khloe Sparks   MRN: [id number] : 1935        Procedure : Procedure(s):  OPEN REDUCTION INTERNAL FIXATION, FRACTURE, FEMUR, PROXIMAL          Past Medical History:   Diagnosis Date     Dyslipidemia      Hypertension       Past Surgical History:   Procedure Laterality Date     HC REMOVE TONSILS/ADENOIDS,<11 Y/O      Description: Tonsillectomy With Adenoidectomy;  Recorded: 2010;     OPEN REDUCTION INTERNAL FIXATION HIP UNIPOLAR Left 2022    Procedure: LEFT Hip Hemiarthroplasty;  Surgeon: Chacho Dalton MD;  Location: [facility] OR     AL CLOSED RX DIST RAD/ULNA FX,MANIPUL      Description: Closed Treatment Of Fracture Of Distal Radius, Manipulation;  Proc Date: 2002;  Comments: fell from ladder     AL CLOSED RX MID HUMERUS FRACTURE      Description: Closed Treat Of Fracture Of The Humeral Shaft;  Proc Date: 2007;  Comments: fell from wall      No Known Allergies   Social History     Tobacco Use     Smoking status: Former     Types: Cigarettes     Quit date: 1975     Years since quittin.4     Smokeless tobacco: Never     Tobacco comments:     quit     Substance Use Topics     Alcohol use: Yes     Comment: Alcoholic Drinks/day: glass per week      Wt Readings from Last 1 Encounters:   22 55.3 kg (122 lb)        Anesthesia Evaluation   Pt has had prior anesthetic.     No history of anesthetic complications       ROS/MED HX  ENT/Pulmonary: Comment: glaucoma    (+) tobacco use, Past use,  (-) sleep apnea and recent URI   Neurologic:     (+) dementia (Alzheimer's disease),  (-) no seizures, no CVA and no TIA   Cardiovascular: Comment: Incidental right MCA bifurcation aneurysm    (+) Dyslipidemia hypertension----- (-) CHF, orthopnea/PND and arrhythmias   METS/Exercise Tolerance:     Hematologic:  - neg hematologic  ROS     Musculoskeletal: Comment: Incidental cervical canal stenosis at L4-L5  (+) fracture (Acute traumatic right  femoral neck fracture s/p mechanical fall),     GI/Hepatic:     (+) cholecystitis/cholelithiasis (Incidental),  (-) GERD   Renal/Genitourinary:  - neg Renal ROS     Endo:    (-) Type II DM and thyroid disease   Psychiatric/Substance Use:  - neg psychiatric ROS     Infectious Disease:  - neg infectious disease ROS     Malignancy:   (+) Malignancy, History of Skin.    Other:            Physical Exam    Airway        Mallampati: II   TM distance: > 3 FB   Neck ROM: full   Mouth opening: > 3 cm    Respiratory Devices and Support         Dental           Cardiovascular          Rhythm and rate: regular and normal     Pulmonary           breath sounds clear to auscultation           OUTSIDE LABS:  CBC:   Lab Results   Component Value Date    WBC 7.5 01/15/2023    WBC 9.0 06/04/2022    HGB 14.4 01/15/2023    HGB 12.2 06/07/2022    HCT 44.4 01/15/2023    HCT 40.0 06/04/2022     01/15/2023     06/08/2022     BMP:   Lab Results   Component Value Date     01/15/2023     06/04/2022    POTASSIUM 3.9 01/15/2023    POTASSIUM 3.8 06/04/2022    CHLORIDE 107 01/15/2023    CHLORIDE 105 06/04/2022    CO2 27 01/15/2023    CO2 27 06/04/2022    BUN 13 01/15/2023    BUN 16 06/04/2022    CR 0.55 01/15/2023    CR 0.49 (L) 06/05/2022    GLC 97 01/15/2023     (H) 06/08/2022     COAGS: No results found for: PTT, INR, FIBR  POC: No results found for: BGM, HCG, HCGS  HEPATIC:   Lab Results   Component Value Date    ALBUMIN 3.9 04/15/2022    PROTTOTAL 7.1 04/15/2022    ALT 28 04/15/2022    AST 18 04/15/2022    ALKPHOS 91 04/15/2022    BILITOTAL 0.3 04/15/2022     OTHER:   Lab Results   Component Value Date    ERICA 8.9 01/15/2023    TSH 3.91 04/15/2022       Anesthesia Plan    ASA Status:  3   NPO Status:  NPO Appropriate    Anesthesia Type: Spinal.      Maintenance: TIVA.        Consents    Anesthesia Plan(s) and associated risks, benefits, and realistic alternatives discussed. Questions answered and  patient/representative(s) expressed understanding.     - Discussed: Risks, Benefits and Alternatives for the PROCEDURE were discussed     - Discussed with:  Patient         Postoperative Care    Pain management: Multi-modal analgesia, IV analgesics, Peripheral nerve block (Single Shot).   PONV prophylaxis: Background Propofol Infusion, Ondansetron (or other 5HT-3), Dexamethasone or Solumedrol     Comments:                Ana Rios MD

## 2023-01-16 NOTE — PLAN OF CARE
Goal Outcome Evaluation:      Pt struggles with speech, alert to self, baseline dementia, VSS on room air. Tele ST,  incontinent of bladder this shift. R hip pain managed with PRN Dilaudid, PIV infusing.

## 2023-01-17 ENCOUNTER — APPOINTMENT (OUTPATIENT)
Dept: SPEECH THERAPY | Facility: CLINIC | Age: 88
DRG: 482 | End: 2023-01-17
Attending: HOSPITALIST
Payer: MEDICARE

## 2023-01-17 ENCOUNTER — APPOINTMENT (OUTPATIENT)
Dept: PHYSICAL THERAPY | Facility: CLINIC | Age: 88
DRG: 482 | End: 2023-01-17
Payer: MEDICARE

## 2023-01-17 LAB
ATRIAL RATE - MUSE: 82 BPM
DIASTOLIC BLOOD PRESSURE - MUSE: NORMAL MMHG
GLUCOSE BLDC GLUCOMTR-MCNC: 142 MG/DL (ref 70–99)
HGB BLD-MCNC: 12.2 G/DL (ref 11.7–15.7)
INTERPRETATION ECG - MUSE: NORMAL
P AXIS - MUSE: 67 DEGREES
PR INTERVAL - MUSE: 130 MS
QRS DURATION - MUSE: 78 MS
QT - MUSE: 380 MS
QTC - MUSE: 443 MS
R AXIS - MUSE: 71 DEGREES
SYSTOLIC BLOOD PRESSURE - MUSE: NORMAL MMHG
T AXIS - MUSE: 73 DEGREES
VENTRICULAR RATE- MUSE: 82 BPM

## 2023-01-17 PROCEDURE — 258N000003 HC RX IP 258 OP 636: Performed by: STUDENT IN AN ORGANIZED HEALTH CARE EDUCATION/TRAINING PROGRAM

## 2023-01-17 PROCEDURE — 97530 THERAPEUTIC ACTIVITIES: CPT | Mod: GP

## 2023-01-17 PROCEDURE — 92526 ORAL FUNCTION THERAPY: CPT | Mod: GN | Performed by: SPEECH-LANGUAGE PATHOLOGIST

## 2023-01-17 PROCEDURE — 120N000001 HC R&B MED SURG/OB

## 2023-01-17 PROCEDURE — 92610 EVALUATE SWALLOWING FUNCTION: CPT | Mod: GN | Performed by: SPEECH-LANGUAGE PATHOLOGIST

## 2023-01-17 PROCEDURE — 97161 PT EVAL LOW COMPLEX 20 MIN: CPT | Mod: GP

## 2023-01-17 PROCEDURE — 85018 HEMOGLOBIN: CPT | Performed by: STUDENT IN AN ORGANIZED HEALTH CARE EDUCATION/TRAINING PROGRAM

## 2023-01-17 PROCEDURE — 250N000013 HC RX MED GY IP 250 OP 250 PS 637: Performed by: STUDENT IN AN ORGANIZED HEALTH CARE EDUCATION/TRAINING PROGRAM

## 2023-01-17 PROCEDURE — 99232 SBSQ HOSP IP/OBS MODERATE 35: CPT | Performed by: HOSPITALIST

## 2023-01-17 PROCEDURE — 250N000011 HC RX IP 250 OP 636: Performed by: STUDENT IN AN ORGANIZED HEALTH CARE EDUCATION/TRAINING PROGRAM

## 2023-01-17 PROCEDURE — 250N000013 HC RX MED GY IP 250 OP 250 PS 637: Performed by: PHYSICIAN ASSISTANT

## 2023-01-17 PROCEDURE — 36415 COLL VENOUS BLD VENIPUNCTURE: CPT | Performed by: STUDENT IN AN ORGANIZED HEALTH CARE EDUCATION/TRAINING PROGRAM

## 2023-01-17 RX ORDER — ACETAMINOPHEN 325 MG/1
650 TABLET ORAL EVERY 4 HOURS PRN
Qty: 100 TABLET | Refills: 0 | DISCHARGE
Start: 2023-01-19 | End: 2024-01-01

## 2023-01-17 RX ORDER — ONDANSETRON 4 MG/1
4 TABLET, ORALLY DISINTEGRATING ORAL EVERY 6 HOURS PRN
Qty: 5 TABLET | Refills: 0 | DISCHARGE
Start: 2023-01-17 | End: 2024-01-01

## 2023-01-17 RX ORDER — ASPIRIN 325 MG
325 TABLET ORAL DAILY
Qty: 42 TABLET | Refills: 0 | DISCHARGE
Start: 2023-01-17 | End: 2023-02-28

## 2023-01-17 RX ORDER — AMOXICILLIN 250 MG
1 CAPSULE ORAL 2 TIMES DAILY
Qty: 14 TABLET | Refills: 0 | DISCHARGE
Start: 2023-01-17 | End: 2024-01-01

## 2023-01-17 RX ORDER — OXYCODONE HYDROCHLORIDE 5 MG/1
5 TABLET ORAL EVERY 4 HOURS PRN
Qty: 20 TABLET | Refills: 0 | Status: SHIPPED | OUTPATIENT
Start: 2023-01-17 | End: 2024-01-01

## 2023-01-17 RX ORDER — ASPIRIN 325 MG
325 TABLET ORAL DAILY
Status: DISCONTINUED | OUTPATIENT
Start: 2023-01-17 | End: 2023-01-20 | Stop reason: HOSPADM

## 2023-01-17 RX ADMIN — POLYETHYLENE GLYCOL 3350 17 G: 17 POWDER, FOR SOLUTION ORAL at 12:09

## 2023-01-17 RX ADMIN — ACETAMINOPHEN 975 MG: 325 TABLET, FILM COATED ORAL at 19:55

## 2023-01-17 RX ADMIN — SENNOSIDES AND DOCUSATE SODIUM 1 TABLET: 50; 8.6 TABLET ORAL at 12:10

## 2023-01-17 RX ADMIN — ASPIRIN 325 MG ORAL TABLET 325 MG: 325 PILL ORAL at 12:09

## 2023-01-17 RX ADMIN — ACETAMINOPHEN 975 MG: 325 TABLET, FILM COATED ORAL at 12:10

## 2023-01-17 RX ADMIN — SODIUM CHLORIDE, POTASSIUM CHLORIDE, SODIUM LACTATE AND CALCIUM CHLORIDE: 600; 310; 30; 20 INJECTION, SOLUTION INTRAVENOUS at 03:47

## 2023-01-17 RX ADMIN — ATORVASTATIN CALCIUM 20 MG: 20 TABLET, FILM COATED ORAL at 12:10

## 2023-01-17 RX ADMIN — CEFAZOLIN 1 G: 1 INJECTION, POWDER, FOR SOLUTION INTRAMUSCULAR; INTRAVENOUS at 04:04

## 2023-01-17 ASSESSMENT — ACTIVITIES OF DAILY LIVING (ADL)
ADLS_ACUITY_SCORE: 39
ADLS_ACUITY_SCORE: 45
ADLS_ACUITY_SCORE: 45
ADLS_ACUITY_SCORE: 39
ADLS_ACUITY_SCORE: 41
ADLS_ACUITY_SCORE: 39
ADLS_ACUITY_SCORE: 45
ADLS_ACUITY_SCORE: 39
ADLS_ACUITY_SCORE: 45

## 2023-01-17 NOTE — CARE PLAN
A&O to self only. Hx of dementia. POD #1 ORIF R Femur fx. Does not appear to be in pain. All PO being held d/t issues swallowing/possibly aspirating. Dressing CDI, no signs of bleeding. Pt able to turn self in bed. LR @ 100mL/hr with intermittent ancef. Pt incontinent of bowel x3 and bladder x 2. Pt was bladder scanned multiple times. One scan was > 500, writer was about to straight cath when writer noticed she had an incontinent BM, cleaning pt and then pt voided incontinent, rescanned for 437. Orders to straight cath if > 500. Writer did not straight cath. Purewick was in, perhaps pt does not understand what the purewick is for, therefore purewick was removed. Pt not OOB this shift. Would recommend A2 GBW at first.

## 2023-01-17 NOTE — PROGRESS NOTES
Patient shows signs of aspirations: coughing while drinking water and swallowing pills.    Her son confirmed that he has to thicken liquids at home because she is aspirating.    Message has been conveyed to the night shift nurse. I removed the cup of water at the first signs of aspiration.

## 2023-01-17 NOTE — PROGRESS NOTES
"   Clinical Swallow Evaluation  01/17/23 7912   Appointment Info   Signing Clinician's Name / Credentials (SLP) Stephania Mcneil MS CCC-SLP   General Information   Onset of Illness/Injury or Date of Surgery 01/15/23   Referring Physician Dr. Guillen   Patient/Family Therapy Goal Statement (SLP) Unable   Pertinent History of Current Problem POD 1 Femur fracture; \"87 year old  female with medical history of hypertension, dyslipidemia, Alzheimer's disease, possible dementia brought into the ED by her son after a fall.\"   General Observations Pt alert, unable to follow conversation or questions, but did appear eager to eat/drink; did not appear to be in pain   Type of Evaluation   Type of Evaluation Swallow Evaluation   Oral Motor   Oral Musculature unable to assess due to poor participation/comprehension   Dentition (Oral Motor)   Dentition (Oral Motor) natural dentition   Vocal Quality/Secretion Management (Oral Motor)   Vocal Quality (Oral Motor) WNL   General Swallowing Observations   Past History of Dysphagia No documented history or SLP notes. MD noted that son has been thickening her liquids at home? Son did not answer and pt unable to provide hx at this time; RN noted coughing with liquids yesterday   Respiratory Support (General Swallowing Observations) none   Current Diet/Method of Nutritional Intake (General Swallowing Observations, NIS) NPO   Swallowing Evaluation Clinical swallow evaluation   Clinical Swallow Evaluation   Feeding Assistance set up only required   Clinical Swallow Evaluation Textures Trialed thin liquids;mildly thick liquids;pureed;solid foods   Clinical Swallow Eval: Thin Liquid Texture Trial   Mode of Presentation, Thin Liquids cup;straw;self-fed;fed by clinician   Oral Phase of Swallow premature pharyngeal entry;delayed AP movement   Pharyngeal Phase of Swallow impaired;coughing/choking   Diagnostic Statement Intermittent delay and oral holding; timing was better when she was feeding herself. " Suspect pharyngeal deficits with immediate coughing on 4/4 trials   Clinical Swallow Eval: Mildly Thick Liquids   Mode of Presentation straw;fed by clinician   Oral Phase premature pharyngeal entry   Pharyngeal Phase intact   Diagnostic Statement Better timing when pt drinking independently from a straw; no overt s/sx of aspiration   Clinical Swallow Evaluation: Puree Solid Texture Trial   Mode of Presentation, Puree spoon;self-fed   Oral Phase, Puree WFL   Pharyngeal Phase, Puree intact   Diagnostic Statement Oral holding/did not swallow when SLP fed pt, however, when handed the spoon and cup, pt consumed two cups of puree with no difficulty. Note occasionally trying to suck on the spoon as a straw, but independently corrected herself   Clinical Swallow Evaluation: Solid Food Texture Trial   Mode of Presentation self-fed   Oral Phase delayed AP movement   Pharyngeal Phase impaired;coughing/choking   Diagnostic Statement Intermittent oral holding/delay with cough x1; highly distractable; given reduced distractions and self feeding, better timing and no overt s/sx of aspiration   Esophageal Phase of Swallow   Patient reports or presents with symptoms of esophageal dysphagia No   Swallowing Recommendations   Diet Consistency Recommendations easy to chew (level 7);mildly thick liquids (level 2)   Supervision Level for Intake distant supervision needed   Mode of Delivery Recommendations bolus size, small;slow rate of intake   Swallowing Maneuver Recommendations alternate food and liquid intake   Monitoring/Assistance Required (Eating/Swallowing) stop eating activities when fatigue is present;monitor for cough or change in vocal quality with intake;check mouth frequently for oral residue/pocketing;cue for finger/lingual sweep if oral pocketing present   Recommended Feeding/Eating Techniques (Swallow Eval) set-up and prepare tray;minimize distractions during oral intake   Medication Administration Recommendations,  Swallowing (SLP) Crush meds, then hand pt the spoon so she can feed herself   Instrumental Assessment Recommendations VFSS (videofluoroscopic swallowing study)   Comment, Swallowing Recommendations Recommended by MD d/t thickening liquids at home and s/sx of aspiration; pt alert and able to tolerate assessment; will order to be completed 1/18   General Therapy Interventions   Planned Therapy Interventions Dysphagia Treatment   Dysphagia treatment Instruction of safe swallow strategies;Modified diet education   Clinical Impression   Criteria for Skilled Therapeutic Interventions Met (SLP Eval) Yes, treatment indicated   SLP Diagnosis Dysphagia   Risks & Benefits of therapy have been explained evaluation/treatment results reviewed;care plan/treatment goals reviewed;risks/benefits reviewed;participants voiced agreement with care plan;current/potential barriers reviewed;patient   Clinical Impression Comments Pt presents with mild to moderate dysphagia secondary to reduced awareness with PO trials. Note significant improvement when tray set up provided and SLP stepped out of the line of vision, so pt could feed herself with no distractions. Unable to rule out aspiration at bedside and given hx, will order VFSS to further assess.   SLP Total Evaluation Time   Eval: oral/pharyngeal swallow function, clinical swallow Minutes (18234) 25   SLP Goals   Therapy Frequency (SLP Eval) 5 times/wk   SLP Predicted Duration/Target Date for Goal Attainment 01/31/23   SLP Goals Swallow   SLP: Safely tolerate diet without signs/symptoms of aspiration Regular diet;Independently;Thin liquids   Interventions   Interventions Quick Adds Swallowing Dysfunction   Swallowing Dysfunction &/or Oral Function for Feeding   Treatment of Swallowing Dysfunction &/or Oral Function for Feeding Minutes (59761) 10   Symptoms Noted During/After Treatment None   Treatment Detail/Skilled Intervention Cuing provided throughout to clear oral cavity; provided  education on recommendations   SLP Discharge Planning   SLP Plan VFSS   SLP Discharge Recommendation home with home care speech therapy   SLP Rationale for DC Rec Pending further work up with VFSS   SLP Brief overview of current status  Clinical evaluation: easy to chew (IDDSI level 7) with mildly thick liquids (straws ok); assist with tray set up, then remove all distractions and encourage pt to feed herself when she's alert and fully upright; VFSS ordered for 1/18   Total Session Time   Total Session Time (sum of timed and untimed services) 35

## 2023-01-17 NOTE — PROGRESS NOTES
01/17/23 1200   Appointment Info   Signing Clinician's Name / Credentials (PT) Brianna Norris, PT, DPT   Rehab Comments (PT) RLE WBAT, No hip precautions   Living Environment   Living Environment Comments Patient with baseline dementia and some difficulty communicating.  Son (Alen) called and sharing that patient lives with him in a multi-level house.  There are 4 MIKY with no rails.  Patient's bedroom is upstairs, about 12-13 steps with bilateral rails.  Patient has a tub-shower with grab bars.  There is no shower chair/bench because patient prefers not to sit but family is considering purchasing this device.  Family did purchase a toilet riser with armrests.   Self-Care   Usual Activity Tolerance good   Current Activity Tolerance fair   Fall history within last six months yes   Number of times patient has fallen within last six months 2  (Mechanical fall while at mall with son)   Activity/Exercise/Self-Care Comment Patient's son states patient was independent for dressing and toileting but receiving assistance for bathing.  Family was assisting with IADLs and transportation.  Patient was independent for ambulation in the home and would hang onto his arm when ambulating in the community.  Patient has had two falls in the past 6 months, both occurred while ambulating on an declined slope.  Patient required TCU after her fall 6 months ago.    General Information   Onset of Illness/Injury or Date of Surgery 01/15/23   Referring Physician Greg Mcguire MD   Patient/Family Therapy Goals Statement (PT) Patient would like to sit in chair.   Pertinent History of Current Problem (include personal factors and/or comorbidities that impact the POC) 87 year old  female with medical history of hypertension, dyslipidemia, Alzheimer's disease, possible dementia brought into the ED by her son after a fall.   Existing Precautions/Restrictions fall;weight bearing   Weight-Bearing Status - RLE weight-bearing as tolerated   Cognition    Affect/Mental Status (Cognition) confused   Orientation Status (Cognition) oriented to;person   Follows Commands (Cognition) follows one-step commands;75-90% accuracy;delayed response/completion;increased processing time needed;repetition of directions required   Pain Assessment   Patient Currently in Pain No   Integumentary/Edema   Integumentary/Edema Comments Age-related skin changes, right hip incision   Posture    Posture Forward head position;Protracted shoulders   Range of Motion (ROM)   Range of Motion ROM deficits secondary to pain;ROM deficits secondary to weakness   ROM Comment No right hip precautions but R hip motion limited by pain and weakness   Strength (Manual Muscle Testing)   Strength (Manual Muscle Testing) Able to perform L SLR;Deficits observed during functional mobility   Strength Comments Unable to complete R SLR   Bed Mobility   Comment, (Bed Mobility) Patient requires minAx1 at R LE to assist to EOB during supine > sit transfer with HOB elevated and UE support on bed rail.   Transfers   Comment, (Transfers) Patient performs sit <> stand from bed with minAx1 initially but progressing to CGA.  Demonstrates unsafe hand placement pulling on walker with B UEs.   Gait/Stairs (Locomotion)   Comment, (Gait/Stairs) Patient completes stand-pivot transfer from bed to recliner chair with CGA, demonstrates slowed gait and forward flexed posture.   Balance   Balance Comments Impaired dynamic balance, recent fall with fracture   Sensory Examination   Sensory Perception patient reports no sensory changes   Clinical Impression   Criteria for Skilled Therapeutic Intervention Yes, treatment indicated   PT Diagnosis (PT) Impaired functional mobility   Influenced by the following impairments Right LE pain and incision, limited R LE ROM and strength, generalized weakness and deconditioning, impaired balance, decreased activity tolerance, baseline dementia   Functional limitations due to impairments Impaired  independence with bed mobility, transfers, and gait   Clinical Presentation (PT Evaluation Complexity) Stable/Uncomplicated   Clinical Presentation Rationale Clinical judgement, PMH, social support   Clinical Decision Making (Complexity) low complexity   Planned Therapy Interventions (PT) balance training;bed mobility training;cryotherapy;gait training;home exercise program;neuromuscular re-education;patient/family education;postural re-education;strengthening;transfer training;progressive activity/exercise   Anticipated Equipment Needs at Discharge (PT) walker, rolling   Risk & Benefits of therapy have been explained evaluation/treatment results reviewed;care plan/treatment goals reviewed;risks/benefits reviewed;participants voiced agreement with care plan;participants included;patient   PT Total Evaluation Time   PT Eval, Low Complexity Minutes (80511) 8   Plan of Care Review   Plan of Care Reviewed With patient   Physical Therapy Goals   PT Frequency Daily   PT Predicted Duration/Target Date for Goal Attainment 01/24/23   PT Goals Bed Mobility;Transfers;Gait   PT: Bed Mobility Supervision/stand-by assist;Supine to/from sit;Rolling   PT: Transfers Supervision/stand-by assist;Bed to/from chair;Sit to/from stand;Assistive device   PT: Gait Supervision/stand-by assist;100 feet;Rolling walker   Interventions   Interventions Quick Adds Therapeutic Activity   Therapeutic Activity   Therapeutic Activities: dynamic activities to improve functional performance Minutes (19359) 14   Symptoms Noted During/After Treatment Fatigue;Increased pain   Treatment Detail/Skilled Intervention Patient greeted supine in bed, RN arriving with medications which are crushed and administered in apple sauce during session.  Patient oriented to self only.  Patient with some difficulty communicating, vocalizes loudly.  Patient educated on role of PT in acute care setting, likely will require review of education given baseline cognition with  dementia.  Patient cued for B ankle pumps and SLRs.  Unable to complete R SLR.  Patient requires Tomy for supine > sit as unable to bring R LE to EOB.  She maintains static sitting balance with SBA.  Patient performs x2 sit <> stands from bed, first with Tomy but progressing to CGA.  Patient instructed for safe hand placement pushing from bed.  Upright posture with forward gaze encouraged in standing.  Patient completes stand-pivot transfer to recliner chair with CGA, cued to back up to seat before attempting to sit.  Patient left seated in chair for lunch, call light in reach and chair alarm activated.  Ice applied to R hip for pain and edema management.   PT Discharge Planning   PT Plan Progress transfers and gait with FWW as able, clarify home set-up and assistance   PT Discharge Recommendation (DC Rec) home with assist;home with home care physical therapy; Transitional Care Unit   PT Rationale for DC Rec Patient present below reported baseline, some difficulty determining PLOF given baseline dementia.  Patient able to perform bed mobility, transfers, and short distance gait with Ax1.  Anticipate use of FWW at discharge.  If patient has 24/7 Ax1 available from family, anticipate discharge home with  PT to progress safety and independence with functional mobility tasks.  Likely will require TCU if family unable to assist given impaired cognition and poor mobility with high falls risk.   PT Brief overview of current status Tomy bed mob, CGA STS, CGA FWW stand-pivot   Total Session Time   Timed Code Treatment Minutes 14   Total Session Time (sum of timed and untimed services) 22

## 2023-01-17 NOTE — PROGRESS NOTES
Orthopedic Surgery  Khloe Sparks  01/17/2023     Admit Date:  1/15/2023    POD: 1 Day Post-Op   Procedure(s):  OPEN REDUCTION INTERNAL FIXATION, FRACTURE, FEMUR, PROXIMAL    Patient with dementia and unable to effectively communicate.  Patient resting comfortably in bed.    Pain seemingly well-controlled.  RN notes no difficulty with repositioning in bed or signs of pain.  Patient has remained NPO due to showing signs of aspiration. Question on whether swallow study will be performed as patient's son notes that she is on thickened liquids at home.  No vomiting.  Afebrile.  No acute events overnight.    Temp:  [97.4  F (36.3  C)-99.9  F (37.7  C)] 97.4  F (36.3  C)  Pulse:  [65-96] 76  Resp:  [12-26] 18  BP: ()/(53-81) 124/67  SpO2:  [91 %-100 %] 91 %    Alert.   Right hip dressing is clean, dry, and intact.   No erythema of the surrounding skin.   Bilateral calves are soft, non-tender.  Right lower extremity is NVI.  Sensation intact bilateral lower extremities.  Patient able to actively dorsi and plantar flex the ankles, bilaterally, on command.  DP pulse palpable.    Labs:  Recent Labs   Lab Test 01/17/23  0609 01/15/23  1836 06/08/22  0648 06/07/22  0739 06/06/22  0722 06/04/22  1548 04/15/22  1417   WBC  --  7.5  --   --   --  9.0 6.2   HGB 12.2 14.4  --  12.2   < > 13.2 14.3   PLT  --  194 183  --   --  205 231    < > = values in this interval not displayed.     A/P    1. S/p S/p ORIF of the right femoral neck fracture with FNS system   Continue  mg daily x 6 weeks for DVT prophylaxis.     Mobilize with PT/OT    WBAT RLE with walker x 6 weeks.     Continue current pain regimen.   Dressings: Change to Aquacel dressing on POD#3. Otherwise, keep intact.  Change if >50% saturated or peeling off.    Follow-up: 2 weeks post-op with Dr. Greg Mcguire    2. Disposition   Anticipate d/c to TCU when medically cleared and progressing in PT.    MITESH Garcia-C  [facility]

## 2023-01-17 NOTE — PROGRESS NOTES
[facility]    Medicine Progress Note - Hospitalist Service    Date of Admission:  1/15/2023    Assessment & Plan   Khloe Sparks is a 87 year old  female with medical history of hypertension, dyslipidemia, Alzheimer's disease, possible dementia brought into the ED by her son after a fall.     Acute traumatic right femoral neck fracture  Status post fall unwitnessed likely mechanical  History of left femoral neck fracture due to mechanical fall 6/2022  Per report patient and [place] were at the mall, patient had fall that was witnessed.  Subsequently complained of right hip pain.  No report of striking her head on the ground. On exam multiple skin abrasions on knees, legs and arms.  Moving all extremities, strength 5/5 in all extremities except right lower extremity. Sodium 141, potassium 3.9, creatinine 0.55.  WBC 7.5, hemoglobin 15.4, platelets 194.  Blood glucose 97. X-ray right knee no fractures.  X-ray right hip and pelvis with acute nondisplaced minimally impacted subcapital right femoral neck fracture where there is focal cortical offset. CT head no acute intracranial process. CT lumbar spine no acute lumbar spine fracture.    Admitted to inpatient.    Orthopedic surgery consulted, appreciate their assistance.    S/p ORIF of the right femoral neck fracture with FNS system by Dr. Mcguire on 1/16/23.    Post-op management per orthopedics.    Defer DVT prophylaxis to orthopedics.    Pain control.    Needs outpatient bone health evaluation, vitamin D, TSH as outpatient.    Incentive spirometry.    PT/OT consults.    Care transitions consult.     Incidental right MCA bifurcation aneurysm  CT head with 5 mm inferiorly projecting right MCA bifurcation aneurysm.  Recommend CTA head.    No acute neurological deficit.  Speech slow for almost a year now.    Admitting hospitalist discussed with patient's son. Consider CTA head after surgery, prior to discharge.     Incidental cervical canal stenosis  at L4-L5  CT lumbar spine with circumferential bulge with severe canal stenosis at L4-L5.    Rehab evaluation after surgery.    Will need to follow-up with orthospine as outpatient.     Incidental cholelithiasis  CT imaging with cholelithiasis. No right upper quadrant tenderness.  No report of vomiting.    Follow liver enzymes.    Consider further work-up if symptomatic.     Incidental uterine lesion  CT with suggestion of a partially imaged uterine lesion which is also seen on 06/04/2022.    Admitting hospitalist discussed with patient's son, outpatient GYN follow-up.       Hypertension    PTA aspirin temporarily increased for DVT prophylaxis as noted above. Can resume PTA dose when she completes the course for DVT prophylaxis.    Hold PTA lisinopril for now, can resume as needed if BP trends up.    As needed IV hydralazine available.     History of Alzheimer's disease, dementia    Continue PTA donepezil.    Delirium precautions.    Minimize narcotic use.     Dyslipidemia    Continue PTA Lipitor.     Glaucoma    Continue PTA eyedrops.         Diet:   modified diet per SLP  DVT Prophylaxis: Defer to orthopedic service  Saab Catheter: Not present  Lines: None     Cardiac Monitoring: None  Code Status: Full Code      Clinically Significant Risk Factors                                Disposition Plan      Expected Discharge Date: 01/19/2023    Discharge Delays: Placement - TCU              Aric Guillen MD  Hospitalist Service  [facility]  Securely message with [employer] (more info)  Text page via [employer] Paging/Directory   ______________________________________________________________________    Interval History   Khloe Sparks was seen this afternoon. She seems to feel OK, history limited by dementia. Denies chest pain, shortness of breath, nausea. Pain seems to be well controlled.    Physical Exam   Vital Signs: Temp: 97.4  F (36.3  C) Temp src: Axillary BP: 124/67 Pulse: 76   Resp: 18  SpO2: 91 % O2 Device: None (Room air) Oxygen Delivery: 2 LPM  Weight: 122 lbs 0 oz    Constitutional: awake, drowsy, cooperative, no apparent distress, laying in the hospital bed  Respiratory: no increased work of breathing, clear to auscultation bilaterally, no crackles or wheezing  Cardiovascular: regular rate and rhythm, normal S1 and S2, no murmur noted  GI: normal bowel sounds, soft, non-distended, non-tender  Skin: warm, dry  Musculoskeletal: no lower extremity pitting edema present  Neurologic: awake, drowsy, answers some questions, follows commands    Medical Decision Making       **CLEAR ALL SELECTIONS**      Data     I have personally reviewed the following data over the past 24 hrs:    N/A  \   12.2   / N/A     N/A N/A N/A /  142 (H)   N/A N/A N/A \

## 2023-01-18 ENCOUNTER — APPOINTMENT (OUTPATIENT)
Dept: PHYSICAL THERAPY | Facility: CLINIC | Age: 88
DRG: 482 | End: 2023-01-18
Payer: MEDICARE

## 2023-01-18 ENCOUNTER — APPOINTMENT (OUTPATIENT)
Dept: GENERAL RADIOLOGY | Facility: CLINIC | Age: 88
DRG: 482 | End: 2023-01-18
Attending: HOSPITALIST
Payer: MEDICARE

## 2023-01-18 ENCOUNTER — APPOINTMENT (OUTPATIENT)
Dept: CT IMAGING | Facility: CLINIC | Age: 88
DRG: 482 | End: 2023-01-18
Attending: HOSPITALIST
Payer: MEDICARE

## 2023-01-18 ENCOUNTER — APPOINTMENT (OUTPATIENT)
Dept: SPEECH THERAPY | Facility: CLINIC | Age: 88
DRG: 482 | End: 2023-01-18
Payer: MEDICARE

## 2023-01-18 LAB
ALBUMIN SERPL BCG-MCNC: 3.2 G/DL (ref 3.5–5.2)
ALP SERPL-CCNC: 168 U/L (ref 35–104)
ALT SERPL W P-5'-P-CCNC: 300 U/L (ref 10–35)
ANION GAP SERPL CALCULATED.3IONS-SCNC: 8 MMOL/L (ref 7–15)
AST SERPL W P-5'-P-CCNC: ABNORMAL U/L
BILIRUB SERPL-MCNC: 0.8 MG/DL
BUN SERPL-MCNC: 18.3 MG/DL (ref 8–23)
CALCIUM SERPL-MCNC: 8.7 MG/DL (ref 8.8–10.2)
CHLORIDE SERPL-SCNC: 105 MMOL/L (ref 98–107)
CREAT SERPL-MCNC: 0.53 MG/DL (ref 0.51–0.95)
DEPRECATED HCO3 PLAS-SCNC: 26 MMOL/L (ref 22–29)
ERYTHROCYTE [DISTWIDTH] IN BLOOD BY AUTOMATED COUNT: 15.5 % (ref 10–15)
GFR SERPL CREATININE-BSD FRML MDRD: 89 ML/MIN/1.73M2
GLUCOSE SERPL-MCNC: 107 MG/DL (ref 70–99)
GLUCOSE SERPL-MCNC: 125 MG/DL (ref 70–99)
HCT VFR BLD AUTO: 37.3 % (ref 35–47)
HGB BLD-MCNC: 12.5 G/DL (ref 11.7–15.7)
MCH RBC QN AUTO: 28.9 PG (ref 26.5–33)
MCHC RBC AUTO-ENTMCNC: 33.5 G/DL (ref 31.5–36.5)
MCV RBC AUTO: 86 FL (ref 78–100)
PLATELET # BLD AUTO: 142 10E3/UL (ref 150–450)
POTASSIUM SERPL-SCNC: 3.8 MMOL/L (ref 3.4–5.3)
PROT SERPL-MCNC: 5.7 G/DL (ref 6.4–8.3)
RBC # BLD AUTO: 4.32 10E6/UL (ref 3.8–5.2)
SODIUM SERPL-SCNC: 139 MMOL/L (ref 136–145)
WBC # BLD AUTO: 11 10E3/UL (ref 4–11)

## 2023-01-18 PROCEDURE — 70496 CT ANGIOGRAPHY HEAD: CPT | Mod: MG

## 2023-01-18 PROCEDURE — 92611 MOTION FLUOROSCOPY/SWALLOW: CPT | Mod: GN | Performed by: SPEECH-LANGUAGE PATHOLOGIST

## 2023-01-18 PROCEDURE — 250N000009 HC RX 250: Performed by: HOSPITALIST

## 2023-01-18 PROCEDURE — 97530 THERAPEUTIC ACTIVITIES: CPT | Mod: GP

## 2023-01-18 PROCEDURE — 82947 ASSAY GLUCOSE BLOOD QUANT: CPT | Performed by: HOSPITALIST

## 2023-01-18 PROCEDURE — 97116 GAIT TRAINING THERAPY: CPT | Mod: GP

## 2023-01-18 PROCEDURE — 85041 AUTOMATED RBC COUNT: CPT | Performed by: HOSPITALIST

## 2023-01-18 PROCEDURE — 84155 ASSAY OF PROTEIN SERUM: CPT | Performed by: HOSPITALIST

## 2023-01-18 PROCEDURE — 250N000013 HC RX MED GY IP 250 OP 250 PS 637: Performed by: STUDENT IN AN ORGANIZED HEALTH CARE EDUCATION/TRAINING PROGRAM

## 2023-01-18 PROCEDURE — 250N000013 HC RX MED GY IP 250 OP 250 PS 637: Performed by: PHYSICIAN ASSISTANT

## 2023-01-18 PROCEDURE — 250N000011 HC RX IP 250 OP 636: Performed by: HOSPITALIST

## 2023-01-18 PROCEDURE — 36415 COLL VENOUS BLD VENIPUNCTURE: CPT | Performed by: HOSPITALIST

## 2023-01-18 PROCEDURE — 99232 SBSQ HOSP IP/OBS MODERATE 35: CPT | Performed by: HOSPITALIST

## 2023-01-18 PROCEDURE — 120N000001 HC R&B MED SURG/OB

## 2023-01-18 PROCEDURE — 92526 ORAL FUNCTION THERAPY: CPT | Mod: GN | Performed by: SPEECH-LANGUAGE PATHOLOGIST

## 2023-01-18 PROCEDURE — 74230 X-RAY XM SWLNG FUNCJ C+: CPT

## 2023-01-18 RX ORDER — IOPAMIDOL 755 MG/ML
75 INJECTION, SOLUTION INTRAVASCULAR ONCE
Status: COMPLETED | OUTPATIENT
Start: 2023-01-18 | End: 2023-01-18

## 2023-01-18 RX ADMIN — ASPIRIN 325 MG ORAL TABLET 325 MG: 325 PILL ORAL at 08:46

## 2023-01-18 RX ADMIN — SODIUM CHLORIDE 90 ML: 900 INJECTION INTRAVENOUS at 14:35

## 2023-01-18 RX ADMIN — ACETAMINOPHEN 975 MG: 325 TABLET, FILM COATED ORAL at 03:18

## 2023-01-18 RX ADMIN — IOPAMIDOL 75 ML: 755 INJECTION, SOLUTION INTRAVENOUS at 14:35

## 2023-01-18 RX ADMIN — SENNOSIDES AND DOCUSATE SODIUM 1 TABLET: 50; 8.6 TABLET ORAL at 20:50

## 2023-01-18 RX ADMIN — ATORVASTATIN CALCIUM 20 MG: 20 TABLET, FILM COATED ORAL at 08:46

## 2023-01-18 RX ADMIN — ACETAMINOPHEN 975 MG: 325 TABLET, FILM COATED ORAL at 11:40

## 2023-01-18 RX ADMIN — ACETAMINOPHEN 975 MG: 325 TABLET, FILM COATED ORAL at 20:50

## 2023-01-18 RX ADMIN — SENNOSIDES AND DOCUSATE SODIUM 1 TABLET: 50; 8.6 TABLET ORAL at 08:46

## 2023-01-18 ASSESSMENT — ACTIVITIES OF DAILY LIVING (ADL)
ADLS_ACUITY_SCORE: 45

## 2023-01-18 NOTE — PLAN OF CARE
Patient vital signs are at baseline: Yes  Patient able to ambulate as they were prior to admission or with assist devices provided by therapies during their stay:  Yes, A1, walker and gait belt. Ambulated to the bathroom, up in chair for meals  Patient MUST void prior to discharge:  Yes, occasionally incontinent  Patient able to tolerate oral intake:  Yes  Pain has adequate pain control using Oral analgesics:  Yes, denies pain, scheduled Tylenol given    Video swallow done. CT of brain done.   Discharge pending placement.

## 2023-01-18 NOTE — PROGRESS NOTES
Orthopedic Surgery  Khloe Sparks  01/18/2023     Admit Date:  1/15/2023  POD: 2 Days Post-Op   Procedure(s):  OPEN REDUCTION INTERNAL FIXATION, FRACTURE, FEMUR, PROXIMAL    Patient with dementia.  Patient resting comfortably in chair.    Pain well-controlled.  Patient tolerating oral intake.   Afebrile.  No acute events overnight.    Temp:  [97.4  F (36.3  C)-98  F (36.7  C)] 97.4  F (36.3  C)  Pulse:  [56-74] 56  Resp:  [18] 18  BP: ()/(50-58) 132/58  SpO2:  [95 %-97 %] 96 %    Alert, answers yes/no questions.    Right hip dressing is clean, dry, and intact.   No erythema of the surrounding skin.   Bilateral calves are soft, non-tender.  Right lower extremity is NVI.  Sensation intact bilateral lower extremities.  Patient able to actively dorsi and plantar flex the ankles, bilaterally, on command.  DP pulse palpable.    Labs:  Recent Labs   Lab Test 01/18/23  0729 01/17/23  0609 01/15/23  1836 06/08/22  0648 06/06/22  0722 06/04/22  1548   WBC 11.0  --  7.5  --   --  9.0   HGB 12.5 12.2 14.4  --    < > 13.2   *  --  194 183  --  205    < > = values in this interval not displayed.     A/P    1. S/p S/p ORIF of the right femoral neck fracture with FNS system   Continue  mg daily x 6 weeks for DVT prophylaxis.     Mobilize with PT/OT    WBAT RLE with walker x 6 weeks.     Continue current pain regimen.   Dressings: Change to Aquacel dressing on POD#3. Otherwise, keep intact.  Change if >50% saturated or peeling off.    Follow-up: 2 weeks post-op with Dr. Greg Mcguire    2. Disposition   Anticipate d/c to TCU when medically cleared and progressing in PT.    Bethanie Gibson PA-C on 1/18/2023 at 10:11 AM

## 2023-01-18 NOTE — PROGRESS NOTES
[facility]    Medicine Progress Note - Hospitalist Service    Date of Admission:  1/15/2023    Assessment & Plan   Khloe Sparks is a 87 year old  female with medical history of hypertension, dyslipidemia, Alzheimer's disease, possible dementia brought into the ED by her son after a fall.     Acute traumatic right femoral neck fracture  Status post fall unwitnessed likely mechanical  History of left femoral neck fracture due to mechanical fall 6/2022  Per [place] the mall, patient had fall that was witnessed.  Subsequently complained of right hip pain.  No report of striking her head on the ground. On exam multiple skin abrasions on knees, legs and arms.  Moving all extremities, strength 5/5 in all extremities except right lower extremity. Sodium 141, potassium 3.9, creatinine 0.55.  WBC 7.5, hemoglobin 15.4, platelets 194.  Blood glucose 97. X-ray right knee no fractures.  X-ray right hip and pelvis with acute nondisplaced minimally impacted subcapital right femoral neck fracture where there is focal cortical offset. CT head no acute intracranial process. CT lumbar spine no acute lumbar spine fracture.    Admitted to inpatient.    Orthopedic surgery consulted, appreciate their assistance.    S/p ORIF of the right femoral neck fracture with FNS system by Dr. Mcguire on 1/16/23.    Post-op management per orthopedics.    Defer DVT prophylaxis to orthopedics.    Pain control.    Needs outpatient bone health evaluation, vitamin D, TSH as outpatient.    Incentive spirometry.    PT/OT consults.    Care transitions consult.    Will have 24/7 support at home, family considering taking her home rather than TCU depending on her mobility and progress with therapy.     Incidental right MCA bifurcation aneurysm  CT head with 5 mm inferiorly projecting right MCA bifurcation aneurysm.  Recommend CTA head.    No acute neurological deficit.  Speech slow for almost a year  now.    Admitting hospitalist discussed with patient's son. Will get CTA head today to better evaluate. Can consider neurology consultation inpatient or outpatient pending results.     Incidental cervical canal stenosis at L4-L5  CT lumbar spine with circumferential bulge with severe canal stenosis at L4-L5.    Rehab evaluation after surgery.    Will need to follow-up with orthospine as outpatient.     Incidental cholelithiasis  CT imaging with cholelithiasis. No right upper quadrant tenderness.  No report of vomiting.    ALT and alk phos elevated today, previously normal in April. Remains asymptomatic. Will recheck in AM.    Consider further work-up if symptomatic or labs getting worse.     Incidental uterine lesion  CT with suggestion of a partially imaged uterine lesion which is also seen on 06/04/2022.    Admitting hospitalist discussed with patient's son, outpatient GYN follow-up.       Hypertension    PTA aspirin temporarily increased for DVT prophylaxis as noted above. Can resume PTA dose when she completes the course for DVT prophylaxis.    Hold PTA lisinopril for now, can resume as needed if BP trends up.    As needed IV hydralazine available.     History of Alzheimer's disease, dementia    Continue PTA donepezil.    Delirium precautions.    Minimize narcotic use.     Dyslipidemia    Continue PTA Lipitor.     Glaucoma    Continue PTA eyedrops.         Diet:   Easy to chew diet Liquidized/Moderately thick  DVT Prophylaxis: Aspirin per orthopedic surgery  Saab Catheter: Not present  Lines: None     Cardiac Monitoring: None  Code Status: Full Code      Clinically Significant Risk Factors              # Hypoalbuminemia: Lowest albumin = 3.2 g/dL at 1/18/2023 12:26 PM, will monitor as appropriate                   Disposition Plan      Expected Discharge Date: 01/19/2023    Discharge Delays: Placement - TCU              Aric Guillen MD  Hospitalist Service  [facility]  Securely  message with [employer] (more info)  Text page via [facility] Paging/Directory   ______________________________________________________________________    Interval History   Khloe Sparks was seen today.  Seems to feel okay.  History limited by dementia.  Her son was in the room with her, discussed plan of care.    Physical Exam   Vital Signs: Temp: 97.4  F (36.3  C) Temp src: Oral BP: 132/58 Pulse: 56   Resp: 18 SpO2: 96 % O2 Device: None (Room air)    Weight: 122 lbs 0 oz    Constitutional: awake, alert, cooperative, no apparent distress, laying in the hospital bed  Respiratory: no increased work of breathing, clear to auscultation bilaterally, no crackles or wheezing  Cardiovascular: regular rate and rhythm, normal S1 and S2, no murmur noted  GI: normal bowel sounds, soft, non-distended, non-tender  Skin: warm, dry  Musculoskeletal: no lower extremity pitting edema present  Neurologic: awake, alert, answers some questions, follows commands    Medical Decision Making       **CLEAR ALL SELECTIONS**      Data     I have personally reviewed the following data over the past 24 hrs:    11.0  \   12.5   / 142 (L)     139 105 18.3 /  125 (H)   3.8 26 0.53 \       ALT: 300 (H) AST: N/A AP: 168 (H) TBILI: 0.8   ALB: 3.2 (L) TOT PROTEIN: 5.7 (L) LIPASE: N/A

## 2023-01-18 NOTE — PROGRESS NOTES
"VIDEO SWALLOW STUDY       01/18/23 2003   Appointment Info   Signing Clinician's Name / Credentials (SLP) Mary Carmen Curtis M.A., CCC-SLP, [facility]   General Information   Onset of Illness/Injury or Date of Surgery 01/15/23   Referring Physician Dr. Guillen   Patient/Family Therapy Goal Statement (SLP) Not stated   Pertinent History of Current Problem POD 2 ORIF for femoral neck fracture; \"87 year old  female with medical history of hypertension, dyslipidemia, Alzheimer's disease, possible dementia brought into the ED by her son after a fall.\"   General Observations Following commands and answering questions re: personal information following yelling before every verbalization.   Type of Evaluation   Type of Evaluation Swallow Evaluation   Dentition (Oral Motor)   Dentition (Oral Motor) natural dentition   Cough/Swallow/Gag Reflex (Oral Motor)   Volitional Throat Clear/Cough (Oral Motor) WNL   Volitional Swallow (Oral Motor) mildly delayed   Vocal Quality/Secretion Management (Oral Motor)   Vocal Quality (Oral Motor) WNL   Comment, Vocal Quality/Secretion Management (Oral Motor) Yelling before talking, clear voicing and loud   General Swallowing Observations   Past History of Dysphagia None documented   Respiratory Support (General Swallowing Observations) none   Current Diet/Method of Nutritional Intake (General Swallowing Observations, NIS) mildly thick liquids (level 2);easy to chew (level 7)   Swallowing Evaluation Videofluoroscopic swallow study (VFSS)   Clinical Swallow Evaluation   Feeding Assistance frequent cues/help required   Additional evaluation(s) completed today Yes;Recommended   Rationale for completing additional evaluation Video swallow study indicated to assess aspiration risk and oropharyngeal swallow function.   VFSS Evaluation   Radiologist Dr. Patton   Views Taken left lateral   VFSS Textures Trialed slightly thick liquids;mildly thick liquids;moderately thick liquids/liquidized;pureed;soft & " bite-sized   VFSS Eval: Slightly Thick Liquids   Mode of Presentation spoon;fed by clinician   Order of Presentation 4, 5, 6   Preparatory Phase holding;poor bolus control   Oral Phase premature pharyngeal entry   Bolus Location When Swallow Triggered pyriforms   Pharyngeal Phase impaired epiglottic movement;residue in vallecula   Rosenbek's Penetration Aspiration Scale 7 - contrast passes glottis, visable residue remains despite patient's response   Response to Aspiration unproductive reflexive cough;unproductive cued cough  (Delayed reflexive)   Strategies and Compensations reduce bolus size   Diagnostic Statement Salty aspiration with very delayed, non productive cough response   VFSS Eval: Mildly Thick Liquids   Mode of Presentation spoon;fed by clinician   Order of Presentation 1, 2, 3   Preparatory Phase poor bolus control;prolonged bolus preparation;holding   Oral Phase impaired AP movement;premature pharyngeal entry   Bolus Location When Swallow Triggered pyriforms   Pharyngeal Phase impaired epiglottic movement;residue in vallecula   Rosenbek's Penetration Aspiration Scale 7 - contrast passes glottis, visible subglottic residue remains despite patient's response (aspiration)   Response to Aspiration unproductive reflexive cough   Diagnostic Statement Gross aspiration on first swallow, penetration and aspiration additionally of 50% of trials   VFSS Eval: Moderately Thick Liquids   Mode of Presentation spoon;fed by clinician   Order of Presentation 7, 8   Preparatory Phase poor bolus control;holding   Oral Phase premature pharyngeal entry   Bolus Location When Swallow Triggered pyriforms   Pharyngeal Phase residue in vallecula   Rosenbek's Penetration Aspiration Scale 1 - no aspiration, contrast does not enter airway   Diagnostic Statement No penetration or aspiration   VFSS Evaluation: Puree Solid Texture Trial   Mode of Presentation, Puree spoon;fed by clinician   Order of Presentation 9   Preparatory Phase  holding   Oral Phase, Puree premature pharyngeal entry   Bolus Location When Swallow Triggered valleculae   Pharyngeal Phase, Puree residue in vallecula  (Mild)   Rosenbek's Penetration Aspiration Scale: Puree Food Trial Results 1 - no aspiration, contrast does not enter airway   Diagnostic Statement Mild pharyngeal residue   VFSS Eval: Soft & Bite Sized   Mode of Presentation spoon;fed by clinician   Order of presentation 10   Preparatory Phase prolonged bolus preparation   Oral Phase WFL   Bolus Location When Swallow Triggered posterior angle of ramus   Pharyngeal Phase residue in vallecula   Rosenbek's Penetration Aspiration Scale 1 - no aspiration, contrast does not enter airway   Diagnostic Statement Mild pharyngeal residue   Esophageal Phase of Swallow   Patient reports or presents with symptoms of esophageal dysphagia No   Swallowing Recommendations   Diet Consistency Recommendations easy to chew (level 7);moderately thick liquids/liquidized (level 3)   Supervision Level for Intake close supervision needed   Mode of Delivery Recommendations bolus size, small;slow rate of intake;liquids via spoon only   Swallowing Maneuver Recommendations alternate food and liquid intake   Monitoring/Assistance Required (Eating/Swallowing) stop eating activities when fatigue is present;monitor for cough or change in vocal quality with intake;check mouth frequently for oral residue/pocketing   Recommended Feeding/Eating Techniques (Swallow Eval) set-up and prepare tray;minimize distractions during oral intake   Medication Administration Recommendations, Swallowing (SLP) Whole with thick liquid or puree carrier   General Therapy Interventions   Planned Therapy Interventions Dysphagia Treatment   Dysphagia treatment Instruction of safe swallow strategies;Modified diet education   Clinical Impression   Criteria for Skilled Therapeutic Interventions Met (SLP Eval) Yes, treatment indicated   SLP Diagnosis Moderate oropharyngeal  dysphagia   Risks & Benefits of therapy have been explained evaluation/treatment results reviewed;care plan/treatment goals reviewed;risks/benefits reviewed;participants voiced agreement with care plan;current/potential barriers reviewed;patient   SLP Total Evaluation Time   Evaluation, videofluoroscopic eval of swallow function Minutes (15627) 25   SLP Goals   Therapy Frequency (SLP Eval) 5 times/wk   SLP Predicted Duration/Target Date for Goal Attainment 02/01/23   SLP Goals Swallow   SLP: Safely tolerate diet without signs/symptoms of aspiration Independently;Mildly thick liquids;Easy to chew diet   Interventions   Interventions Quick Adds Swallowing Dysfunction   Swallowing Dysfunction &/or Oral Function for Feeding   Treatment of Swallowing Dysfunction &/or Oral Function for Feeding Minutes (35404) 10   Symptoms Noted During/After Treatment Fatigue   Treatment Detail/Skilled Intervention Cued patient for double swallow, alternating textures, cough following aspiration with moderate verbal and visual cueing required.   SLP Discharge Planning   SLP Plan Meal follow up   SLP Discharge Recommendation home with home care speech therapy   SLP Rationale for DC Rec If plan is to discharge home, would require 1:1 feeding assistance at this time and diet modification with liquid thickening. Recommend SLP services as patient is below baseline swallow function.   SLP Brief overview of current status  Video swallow study indicating aspiration with delayed and unproductive cough with slightly and mildly thick liquids.  Recommend easy to chew solids and moderately thick liquids by spoon with 1:1 supervision at this time.   Total Session Time   Total Session Time (sum of timed and untimed services) 35

## 2023-01-18 NOTE — PLAN OF CARE
Patient vital signs are at baseline: Yes  Patient able to ambulate as they were prior to admission or with assist devices provided by therapies during their stay:  Yes, A1-2, walker and gait belt   Patient MUST void prior to discharge:  Yes, incontinent  Patient able to tolerate oral intake:  Yes  Pain has adequate pain control using Oral analgesics:  Yes, denies pain, scheduled Tylenol given    Discharge pending placement.

## 2023-01-18 NOTE — PLAN OF CARE
Goal Outcome Evaluation:    A&O to self only. Hx of dementia. POD #2 ORIF R Femur fx. Does not appear to be in pain. Dressing CDI, no signs of bleeding, dressing reinforced. Pt able to turn self in bed. A1GBW with ambulation. IV SL. Pills crushed in applesauce. Pt able to respond to simple questions but responds slowly and in one word phrases. Pt continent with regular toileting schedule, can be incontinent. Plan to discharge to TCU.

## 2023-01-19 ENCOUNTER — APPOINTMENT (OUTPATIENT)
Dept: PHYSICAL THERAPY | Facility: CLINIC | Age: 88
DRG: 482 | End: 2023-01-19
Payer: MEDICARE

## 2023-01-19 ENCOUNTER — APPOINTMENT (OUTPATIENT)
Dept: SPEECH THERAPY | Facility: CLINIC | Age: 88
DRG: 482 | End: 2023-01-19
Payer: MEDICARE

## 2023-01-19 ENCOUNTER — MEDICAL CORRESPONDENCE (OUTPATIENT)
Dept: HEALTH INFORMATION MANAGEMENT | Facility: CLINIC | Age: 88
End: 2023-01-19

## 2023-01-19 ENCOUNTER — APPOINTMENT (OUTPATIENT)
Dept: OCCUPATIONAL THERAPY | Facility: CLINIC | Age: 88
DRG: 482 | End: 2023-01-19
Attending: STUDENT IN AN ORGANIZED HEALTH CARE EDUCATION/TRAINING PROGRAM
Payer: MEDICARE

## 2023-01-19 LAB
ALBUMIN SERPL BCG-MCNC: 3.2 G/DL (ref 3.5–5.2)
ALP SERPL-CCNC: 214 U/L (ref 35–104)
ALT SERPL W P-5'-P-CCNC: 264 U/L (ref 10–35)
ANION GAP SERPL CALCULATED.3IONS-SCNC: 9 MMOL/L (ref 7–15)
AST SERPL W P-5'-P-CCNC: 138 U/L (ref 10–35)
BILIRUB SERPL-MCNC: 0.7 MG/DL
BUN SERPL-MCNC: 13.7 MG/DL (ref 8–23)
CALCIUM SERPL-MCNC: 8.5 MG/DL (ref 8.8–10.2)
CHLORIDE SERPL-SCNC: 106 MMOL/L (ref 98–107)
CREAT SERPL-MCNC: 0.45 MG/DL (ref 0.51–0.95)
DEPRECATED HCO3 PLAS-SCNC: 23 MMOL/L (ref 22–29)
ERYTHROCYTE [DISTWIDTH] IN BLOOD BY AUTOMATED COUNT: 15.5 % (ref 10–15)
GFR SERPL CREATININE-BSD FRML MDRD: >90 ML/MIN/1.73M2
GLUCOSE SERPL-MCNC: 90 MG/DL (ref 70–99)
HCT VFR BLD AUTO: 40.2 % (ref 35–47)
HGB BLD-MCNC: 13.4 G/DL (ref 11.7–15.7)
MCH RBC QN AUTO: 29.5 PG (ref 26.5–33)
MCHC RBC AUTO-ENTMCNC: 33.3 G/DL (ref 31.5–36.5)
MCV RBC AUTO: 89 FL (ref 78–100)
PLATELET # BLD AUTO: 176 10E3/UL (ref 150–450)
POTASSIUM SERPL-SCNC: 3.7 MMOL/L (ref 3.4–5.3)
PROT SERPL-MCNC: 6.1 G/DL (ref 6.4–8.3)
RBC # BLD AUTO: 4.54 10E6/UL (ref 3.8–5.2)
SODIUM SERPL-SCNC: 138 MMOL/L (ref 136–145)
WBC # BLD AUTO: 8.3 10E3/UL (ref 4–11)

## 2023-01-19 PROCEDURE — 250N000013 HC RX MED GY IP 250 OP 250 PS 637: Performed by: STUDENT IN AN ORGANIZED HEALTH CARE EDUCATION/TRAINING PROGRAM

## 2023-01-19 PROCEDURE — 99232 SBSQ HOSP IP/OBS MODERATE 35: CPT | Performed by: INTERNAL MEDICINE

## 2023-01-19 PROCEDURE — 97530 THERAPEUTIC ACTIVITIES: CPT | Mod: GP

## 2023-01-19 PROCEDURE — 97535 SELF CARE MNGMENT TRAINING: CPT | Mod: GO

## 2023-01-19 PROCEDURE — 80053 COMPREHEN METABOLIC PANEL: CPT | Performed by: HOSPITALIST

## 2023-01-19 PROCEDURE — 120N000001 HC R&B MED SURG/OB

## 2023-01-19 PROCEDURE — 36415 COLL VENOUS BLD VENIPUNCTURE: CPT | Performed by: HOSPITALIST

## 2023-01-19 PROCEDURE — 85027 COMPLETE CBC AUTOMATED: CPT | Performed by: HOSPITALIST

## 2023-01-19 PROCEDURE — 250N000013 HC RX MED GY IP 250 OP 250 PS 637: Performed by: INTERNAL MEDICINE

## 2023-01-19 PROCEDURE — 250N000013 HC RX MED GY IP 250 OP 250 PS 637: Performed by: PHYSICIAN ASSISTANT

## 2023-01-19 PROCEDURE — 97165 OT EVAL LOW COMPLEX 30 MIN: CPT | Mod: GO

## 2023-01-19 PROCEDURE — 92526 ORAL FUNCTION THERAPY: CPT | Mod: GN | Performed by: SPEECH-LANGUAGE PATHOLOGIST

## 2023-01-19 RX ORDER — LISINOPRIL 10 MG/1
10 TABLET ORAL DAILY
Status: DISCONTINUED | OUTPATIENT
Start: 2023-01-19 | End: 2023-01-20 | Stop reason: HOSPADM

## 2023-01-19 RX ORDER — DONEPEZIL HYDROCHLORIDE 5 MG/1
5 TABLET, FILM COATED ORAL AT BEDTIME
Status: DISCONTINUED | OUTPATIENT
Start: 2023-01-19 | End: 2023-01-20 | Stop reason: HOSPADM

## 2023-01-19 RX ADMIN — ATORVASTATIN CALCIUM 20 MG: 20 TABLET, FILM COATED ORAL at 08:32

## 2023-01-19 RX ADMIN — LISINOPRIL 10 MG: 10 TABLET ORAL at 15:28

## 2023-01-19 RX ADMIN — DONEPEZIL HYDROCHLORIDE 5 MG: 5 TABLET, FILM COATED ORAL at 21:16

## 2023-01-19 RX ADMIN — ACETAMINOPHEN 975 MG: 325 TABLET, FILM COATED ORAL at 12:33

## 2023-01-19 RX ADMIN — ACETAMINOPHEN 975 MG: 325 TABLET, FILM COATED ORAL at 04:17

## 2023-01-19 RX ADMIN — ASPIRIN 325 MG ORAL TABLET 325 MG: 325 PILL ORAL at 08:32

## 2023-01-19 ASSESSMENT — ACTIVITIES OF DAILY LIVING (ADL)
ADLS_ACUITY_SCORE: 45

## 2023-01-19 NOTE — PROGRESS NOTES
01/19/23 1440   Appointment Info   Signing Clinician's Name / Credentials (OT) Ritu Green, OTR/L   Living Environment   People in Home child(henry), adult   Current Living Arrangements house   Home Accessibility stairs to enter home;stairs within home   Number of Stairs, Main Entrance 4   Stair Railings, Main Entrance none   Number of Stairs, Within Home, Primary greater than 10 stairs   Stair Railings, Within Home, Primary railings on both sides of stairs   Transportation Anticipated health plan transportation   Living Environment Comments Patient lives with her son in a multi-level home. Patient sleeps upstairs.  She has a tub-shower with grab bars, son reports he is planning to get a shower chair but has not yet.   Self-Care   Usual Activity Tolerance good   Current Activity Tolerance fair   Equipment Currently Used at Home none   Fall history within last six months yes   Number of times patient has fallen within last six months 2   Activity/Exercise/Self-Care Comment Patient's son states patient was independent for dressing and toileting but receiving assistance for bathing.  Patient was independent for ambulation in the home and would hang onto his arm when ambulating in the community.  Patient has had two falls in the past 6 months, both occurred while ambulating on an declined slope.  Patient required TCU after her fall 6 months ago.   Instrumental Activities of Daily Living (IADL)   IADL Comments Family was assisting with IADLs and transportation.   General Information   Onset of Illness/Injury or Date of Surgery 01/15/23   Referring Physician Greg Mcguire MD   Patient/Family Therapy Goal Statement (OT) pt unable to say - son wants pt to be able to go to TCU then home   Additional Occupational Profile Info/Pertinent History of Current Problem Khloe Sparks is a 87 year old  female with medical history of hypertension, dyslipidemia, Alzheimer's disease, possible dementia brought into the ED by her son  after a fall. 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 Per son, pt appears to be at her cognitive baseline (able to understand most commands/questions, but difficulty communicating). During session, she needs additional time/repetition w/ commands, but does well w/ simple cues.   Visual Perception   Visual Impairment/Limitations corrective lenses full-time;WFL   Pain Assessment   Patient Currently in Pain No  (pt shakes head no)   Range of Motion Comprehensive   General Range of Motion bilateral upper extremity ROM WNL   Strength Comprehensive (MMT)   General Manual Muscle Testing (MMT) Assessment no strength deficits identified   Transfers   Transfers sit-stand transfer   Sit-Stand Transfer   Sit-Stand Frio (Transfers) contact guard   Assistive Device (Sit-Stand Transfers) walker, standard   Activities of Daily Living   BADL Assessment/Intervention lower body dressing;grooming   Lower Body Dressing Assessment/Training   Frio Level (Lower Body Dressing) moderate assist (50% patient effort)   Grooming Assessment/Training   Frio Level (Grooming) set up   Clinical Impression   Criteria for Skilled Therapeutic Interventions Met (OT) Yes, treatment indicated   OT Diagnosis decreased I/ADL independence   OT Problem List-Impairments impacting ADL problems related to;activity tolerance impaired;cognition;strength;pain;balance;mobility   Assessment of Occupational Performance 3-5 Performance Deficits   Identified Performance Deficits decreased independence w/ toileting, dressing, functional mobility   Planned Therapy Interventions (OT) ADL  retraining;cognition;bed mobility training;transfer training;progressive activity/exercise   Clinical Decision Making Complexity (OT) low complexity   Anticipated Equipment Needs Upon Discharge (OT) shower chair   Risk & Benefits of therapy have been explained evaluation/treatment results reviewed;care plan/treatment goals reviewed;risks/benefits reviewed;current/potential barriers reviewed;participants voiced agreement with care plan;participants included;patient;son   OT Total Evaluation Time   OT Eval, Low Complexity Minutes (91972) 10   OT Goals   Therapy Frequency (OT) 5 times/wk   OT Predicted Duration/Target Date for Goal Attainment 01/26/23   OT Goals Hygiene/Grooming;Lower Body Dressing;Upper Body Dressing;Toilet Transfer/Toileting   OT: Hygiene/Grooming modified independent;while standing   OT: Upper Body Dressing Modified independent   OT: Lower Body Dressing Modified independent;using adaptive equipment   OT: Toilet Transfer/Toileting Modified independent;toilet transfer;cleaning and garment management;using adaptive equipment   Interventions   Interventions Quick Adds Self-Care/Home Management   Self-Care/Home Management   Self-Care/Home Mgmt/ADL, Compensatory, Meal Prep Minutes (01908) 15   Treatment Detail/Skilled Intervention Pt in chair upon arrival, son also present throughout session. Pt w/ hx of alzheimer's and previous CVA - very limited vocalizations, but does often shout out loudly. Son reports pt appears to be at her cognitive baseline - able to understand most questions/commands but needs additional cues and time. Pt able to don/doff L sock, needs assist w/ R. Pt then stood w/ CGA and FWW, able to ambulate ~20ft w/ CGA and frequent phyical/verbal cues for walker mgmt. Pt tends to push walker in front of her too far. Returned to chair and pt attempting to sit down before she is fully turned. Min A and additional physical cueing needed to maintain safety prior to sit. Pt then completed 2 g/h  tasks w/ set up assist. Left in chair w/ all needs met, ice applied and alarm on.   OT Discharge Planning   OT Plan LB dressing w/ AE, bathroom mobility and toilet transfer   OT Discharge Recommendation (DC Rec) Transitional Care Facility   OT Rationale for DC Rec Pt functioning below baseline, needing 1A w/ all mobility and dressing/toileting. Pt would benefit from TCU prior to return home to progress ALD independence and functional mobility. Son in agreement w/ plan.   OT Brief overview of current status CGA-Min A w/ FWW, Mod A LB dressing   Total Session Time   Timed Code Treatment Minutes 15   Total Session Time (sum of timed and untimed services) 25

## 2023-01-19 NOTE — CONSULTS
Care Management Initial Consult    General Information  Assessment completed with: Family, VM-chart review, oscar Lowry       Primary Care Provider verified and updated as needed: Yes   Readmission within the last 30 days:        Reason for Consult: discharge planning  Advance Care Planning:            Communication Assessment  Patient's communication style: spoken language (English or Bilingual)    Hearing Difficulty or Deaf: no        Cognitive  Cognitive/Neuro/Behavioral: .WDL except  Level of Consciousness: confused  Arousal Level: opens eyes spontaneously  Orientation: disoriented to, place, time, situation  Mood/Behavior: calm, cooperative     Speech: other (see comments), incomprehensible sounds (incomprehensible words)    Living Environment:   People in home: child(henry), adult     Current living Arrangements:        Able to return to prior arrangements: yes       Family/Social Support:  Care provided by: self, child(henry)  Provides care for:                  Description of Support System:           Current Resources:   Patient receiving home care services: No     Community Resources:    Equipment currently used at home:    Supplies currently used at home:      Employment/Financial:  Employment Status:          Financial Concerns:             Lifestyle & Psychosocial Needs:  Social Determinants of Health     Tobacco Use: Medium Risk     Smoking Tobacco Use: Former     Smokeless Tobacco Use: Never     Passive Exposure: Not on file   Alcohol Use: Not on file   Financial Resource Strain: Not on file   Food Insecurity: Not on file   Transportation Needs: Not on file   Physical Activity: Not on file   Stress: Not on file   Social Connections: Not on file   Intimate Partner Violence: Not on file   Depression: Not at risk     PHQ-2 Score: 0   Housing Stability: Not on file       Functional Status:  Prior to admission patient needed assistance:              Mental Health Status:          Chemical Dependency Status:                 Values/Beliefs:  Spiritual, Cultural Beliefs, Latter day Practices, Values that affect care:                 Additional Information:  Called to son Alen to discuss role in discharge planning. Pt lives with son in home. Alen helps with cares as needed.  Pt is not currently open to C (Has had [facility] in the past).  Alen notes that brother helps at times.    Reviewed recommendation for TCU.  Son is in agreement with that.    Pt/family was provided with the Medicare Compare list for SNF.  Discussed associated Medicare star ratings to assist with choice for referrals/discharge planning Yes    Education was given to pt/family that star ratings are updated/maintained by Medicare and can be reviewed by visiting www.medicare.gov Yes    Alen would like referral sent to [facility] as patient has been there before.  Referral sent via [facility]  Alen understands that we may need to expand search and that memory care may be better.     Transportation TBD.  Alen may want to try to transport, aware of cost if it needs to be arranged.     CC/SW to follow for discharge plan.     Care Management Follow Up    Length of Stay (days): 4    Expected Discharge Date: 01/20/2023     Concerns to be Addressed:       Patient plan of care discussed at interdisciplinary rounds: Yes    Anticipated Discharge Disposition: Skilled Nursing Facility     Anticipated Discharge Services:    Anticipated Discharge DME:      Patient/family educated on Medicare website which has current facility and service quality ratings:    Education Provided on the Discharge Plan:    Patient/Family in Agreement with the Plan: yes    Referrals Placed by CM/SW: Post Acute Facilities  Private pay costs discussed: transportation costs    Additional Information:  Received update from Ana that patient accepted at [facility] for Fri/Sat. Pending medical readiness.    Updated MD who is awaiting Stroke Neurology follow up and Pt could possibly be ready  tomorrow.   Updated Son who is in agreement with plan/TCU.   At this point he would want Transportation arranged.     CC/ SW to follow for discharge to TCU- [facility] and [facility].             Candi Yanes RN

## 2023-01-19 NOTE — PLAN OF CARE
A&O to self. Denies pain. Tolerating Easy to chew/ mod thick diet. BS for 491 @ 0119, second BS for 575 @ 0330, and straight cath for 600. Incontinent to B&B. Meds crushed with pudding. Up w/ 1 GB/W. Discharge pending TCU placement.

## 2023-01-19 NOTE — PROGRESS NOTES
Orthopedic Surgery  Khloe Sparks  01/19/2023     Admit Date:  1/15/2023    POD: 3 Days Post-Op   Procedure(s):  OPEN REDUCTION INTERNAL FIXATION, FRACTURE, FEMUR, PROXIMAL    Patient with dementia. Answers some yes or no questions.  Patient resting comfortably in bed.    Pain seemingly well-controlled. Able to move around in bed without apparent pain.  Tolerating modified diet (easy to chew/mod thick).  No vomiting.  Afebrile.  No acute events overnight.    Temp:  [96.8  F (36  C)-97.6  F (36.4  C)] 97.4  F (36.3  C)  Pulse:  [63-72] 63  Resp:  [16-18] 16  BP: (125-159)/(64-77) 125/64  SpO2:  [95 %-96 %] 96 %    Alert.   Right hip dressing is clean, dry, and intact.   Removed for dressing change today and staples and incision are intact.  No drainage.  No erythema of the surrounding skin.   Bilateral calves are soft, non-tender.  Right lower extremity is NVI.  Sensation intact bilateral lower extremities.  Patient able to actively dorsi and plantar flex the ankles, bilaterally, on command.  DP pulse palpable.    Labs:  Recent Labs   Lab Test 01/19/23  0645 01/18/23  0729 01/17/23  0609 01/15/23  1836   WBC 8.3 11.0  --  7.5   HGB 13.4 12.5 12.2 14.4    142*  --  194     A/P    1. S/p ORIF of the right femoral neck fracture with FNS system   Continue  mg daily x 6 weeks for DVT prophylaxis.     Mobilize with PT/OT    WBAT RLE with walker x 6 weeks.     Continue current pain regimen.   Dressings: Converted to Aquacel dressing today. Surgical site is healing well. Keep intact, but okay to change if peeling or >50% saturation.   Follow-up: 2 weeks post-op with Dr. Greg Mcguire    2. Disposition   Anticipate d/c to TCU when medically cleared and progressing in PT.    Adilia Sarkar PA-C  [facility]

## 2023-01-19 NOTE — PROGRESS NOTES
[facility]    Medicine Progress Note - Hospitalist Service    Date of Admission:  1/15/2023    Assessment & Plan   Khloe Sparks is a 87 year old  female with medical history of hypertension, dyslipidemia, Alzheimer's disease, possible dementia brought into the ED by her son after a fall.     Acute traumatic right femoral neck fracture  Status post fall unwitnessed likely mechanical  History of left femoral neck fracture due to mechanical fall 6/2022  [place], patient had fall that was witnessed.  Subsequently complained of right hip pain.  No report of striking her head on the ground. On exam multiple skin abrasions on knees, legs and arms.  Moving all extremities, strength 5/5 in all extremities except right lower extremity. Sodium 141, potassium 3.9, creatinine 0.55.  WBC 7.5, hemoglobin 15.4, platelets 194.  Blood glucose 97. X-ray right knee no fractures.  X-ray right hip and pelvis with acute nondisplaced minimally impacted subcapital right femoral neck fracture where there is focal cortical offset. CT head no acute intracranial process. CT lumbar spine no acute lumbar spine fracture.  Admitted to inpatient.  Orthopedic surgery following, appreciate input  -S/p ORIF of the right femoral neck fracture with FNS system by Dr. Mcguire on 1/16/23.  Post-op management per orthopedics.  Defer DVT prophylaxis to orthopedics, currently on aspirin 325 mg daily.  Needs outpatient bone health evaluation, vitamin D, TSH as outpatient.  Incentive spirometry.  PT/OT following, recommending TCU, family considering taking her home rather than TCU pending her mobility and progress with therapy  Care transitions has been consulted     Incidental right MCA bifurcation aneurysm  CT head with 5 mm inferiorly projecting right MCA bifurcation aneurysm.    Head CTA with 6 mm saccular aneurysm, radiology recommending neuro endovascular consultation  -No acute neurological  issues currently  -Stroke neurology consulted     Incidental cervical canal stenosis at L4-L5  CT lumbar spine with circumferential bulge with severe canal stenosis at L4-L5.  Rehab evaluation after surgery.  Will need to follow-up with orthospine as outpatient.     Incidental cholelithiasis  CT imaging with cholelithiasis. No right upper quadrant tenderness.  No report of vomiting.  -Patient tolerating oral intake, LFTs slightly abnormal but patient currently remains asymptomatic, LFTs on the improving trend  -Outpatient evaluation, will consider further work-up if symptomatic or labs getting worse.     Incidental uterine lesion  CT with suggestion of a partially imaged uterine lesion which is also seen on 06/04/2022.  -Outpatient GYN follow-up.       Hypertension  PTA aspirin temporarily increased for DVT prophylaxis as noted above. Can resume PTA dose when she completes the course for DVT prophylaxis.  PTA lisinopril, currently on hold  Few readings of elevated blood pressure over the last 24 hours  Resume PTA lisinopril with holding parameters  As needed IV hydralazine available.     History of Alzheimer's disease, dementia  Resume PTA donepezil.  Delirium precautions.  Minimize narcotic use.     Dyslipidemia    Continue PTA Lipitor.     Glaucoma    Continue PTA eyedrops.       Diet:  Easy to chew diet level 7, moderately thick  DVT Prophylaxis: PCD/full dose aspirin as per orthopedics  Saab Catheter: Not present  Lines: None     Cardiac Monitoring: None  Code Status: Full Code      Clinically Significant Risk Factors              # Hypoalbuminemia: Lowest albumin = 3.2 g/dL at 1/19/2023  6:45 AM, will monitor as appropriate                   Disposition Plan      Expected Discharge Date: 01/20/2023    Discharge Delays: Placement - TCU              Teri Martinez MD  Hospitalist Service  [facility]  Securely message with [employer] (more info)  Text page via [facility] Paging/Directory    ______________________________________________________________________    Interval History   Patient with no new complaints.  No new nursing concerns.  Called son to update on the results of CTA, updated plan of care.    Physical Exam   Vital Signs: Temp: 97.4  F (36.3  C) Temp src: Oral BP: 125/64 Pulse: 63   Resp: 16 SpO2: 96 % O2 Device: None (Room air)    Weight: 122 lbs 0 oz    Exam:  Constitutional: Awake, alert and no distress. Appears comfortable  Head: Normocephalic. No masses, lesions, tenderness or abnormalities  ENT: ENT exam normal, no neck nodes or sinus tenderness  Cardiovascular: RRR.  2+ murmurs, no rubs or JVD  Respiratory: Normal WOB,b/l equal air entry, no wheezes or crackles   Gastrointestinal: Abdomen soft, non-tender. BS normal. No masses, organomegaly  : Deferred  Extremities : No edema , no clubbing or cyanosis    Neurologic: Cranial nerves II-XII grossly intact , power symmetrical, Reflexes normal and symmetric. Sensation grossly WNL.      Medical Decision Making       42 MINUTES SPENT BY ME on the date of service doing chart review, history, exam, documentation & further activities per the note.      Data     I have personally reviewed the following data over the past 24 hrs:    8.3  \   13.4   / 176     138 106 13.7 /  90   3.7 23 0.45 (L) \       ALT: 264 (H) AST: 138 (H) AP: 214 (H) TBILI: 0.7   ALB: 3.2 (L) TOT PROTEIN: 6.1 (L) LIPASE: N/A       Imaging results reviewed over the past 24 hrs:   Recent Results (from the past 24 hour(s))   CTA Head with Contrast    Narrative    CT ANGIOGRAM OF THE HEAD WITH CONTRAST  1/18/2023 3:03 PM     HISTORY: Assess 5 mm inferiorly projecting right MCA bifurcation  aneurysm noted on CT head earlier this admission.     TECHNIQUE:  CT angiography with an injection of 75mL Isovue-370 IV  with scans through the head. Images were transferred to a separate 3-D  workstation where multiplanar reformations and 3-D images were  created. Radiation dose for  this scan was reduced using automated  exposure control, adjustment of the mA and/or kV according to patient  size, or iterative reconstruction technique.    COMPARISON: Head CT 1/15/2023.     FINDINGS: A 6 mm saccular aneurysm is present at the right middle  cerebral artery bifurcation. No other aneurysms are identified. The  vertebrobasilar system is small in caliber in the setting of fetal  origin of the bilateral posterior cerebral arteries, considered to be  normal variant. No evidence of large vessel occlusion, high-grade  stenosis, or vascular malformation. Scattered atherosclerotic plaquing  with multiple mild stenoses.      Impression    IMPRESSION: A 6 mm saccular aneurysm at the right middle cerebral  artery bifurcation. Recommend neuroendovascular consultation.     LISANDRO UPTON MD         SYSTEM ID:  [id number]     Recent Labs   Lab 01/19/23  0645 01/18/23  1226 01/18/23  0729 01/17/23  0609 01/16/23  0949 01/15/23  1836   WBC 8.3  --  11.0  --   --  7.5   HGB 13.4  --  12.5 12.2  --  14.4   MCV 89  --  86  --   --  91     --  142*  --   --  194    139  --   --   --  141   POTASSIUM 3.7 3.8  --   --   --  3.9   CHLORIDE 106 105  --   --   --  107   CO2 23 26  --   --   --  27   BUN 13.7 18.3  --   --   --  13   CR 0.45* 0.53  --   --   --  0.55   ANIONGAP 9 8  --   --   --  7   ERICA 8.5* 8.7*  --   --   --  8.9   GLC 90 125* 107*  --    < > 97   ALBUMIN 3.2* 3.2*  --   --   --   --    PROTTOTAL 6.1* 5.7*  --   --   --   --    BILITOTAL 0.7 0.8  --   --   --   --    ALKPHOS 214* 168*  --   --   --   --    * 300*  --   --   --   --    *  --   --   --   --   --     < > = values in this interval not displayed.

## 2023-01-19 NOTE — PROGRESS NOTES
Endovascular surgical neuroradiology note    87-year-old woman admitted to the hospital for a femur fracture who was found to have an incidental 6 mm right middle cerebral artery bifurcation aneurysm.  No acute inpatient evaluation needed at this point for this aneurysm.  We will schedule the patient for a outpatient clinic visit.  A message has been sent to the outpatient clinic team.    Endovascular surgical neuroradiology fellow  Pager [phone]

## 2023-01-20 ENCOUNTER — APPOINTMENT (OUTPATIENT)
Dept: OCCUPATIONAL THERAPY | Facility: CLINIC | Age: 88
DRG: 482 | End: 2023-01-20
Payer: MEDICARE

## 2023-01-20 VITALS
DIASTOLIC BLOOD PRESSURE: 80 MMHG | TEMPERATURE: 98.6 F | RESPIRATION RATE: 16 BRPM | BODY MASS INDEX: 19.61 KG/M2 | SYSTOLIC BLOOD PRESSURE: 127 MMHG | HEIGHT: 66 IN | OXYGEN SATURATION: 94 % | HEART RATE: 94 BPM | WEIGHT: 122 LBS

## 2023-01-20 LAB
ALBUMIN SERPL BCG-MCNC: 3.6 G/DL (ref 3.5–5.2)
ALP SERPL-CCNC: 237 U/L (ref 35–104)
ALT SERPL W P-5'-P-CCNC: 240 U/L (ref 10–35)
ANION GAP SERPL CALCULATED.3IONS-SCNC: 13 MMOL/L (ref 7–15)
AST SERPL W P-5'-P-CCNC: 119 U/L (ref 10–35)
BILIRUB SERPL-MCNC: 1 MG/DL
BUN SERPL-MCNC: 12.3 MG/DL (ref 8–23)
CALCIUM SERPL-MCNC: 9.1 MG/DL (ref 8.8–10.2)
CHLORIDE SERPL-SCNC: 104 MMOL/L (ref 98–107)
CREAT SERPL-MCNC: 0.56 MG/DL (ref 0.51–0.95)
DEPRECATED HCO3 PLAS-SCNC: 23 MMOL/L (ref 22–29)
GFR SERPL CREATININE-BSD FRML MDRD: 88 ML/MIN/1.73M2
GLUCOSE SERPL-MCNC: 155 MG/DL (ref 70–99)
POTASSIUM SERPL-SCNC: 3.6 MMOL/L (ref 3.4–5.3)
PROT SERPL-MCNC: 6.6 G/DL (ref 6.4–8.3)
SODIUM SERPL-SCNC: 140 MMOL/L (ref 136–145)

## 2023-01-20 PROCEDURE — 250N000013 HC RX MED GY IP 250 OP 250 PS 637: Performed by: STUDENT IN AN ORGANIZED HEALTH CARE EDUCATION/TRAINING PROGRAM

## 2023-01-20 PROCEDURE — 97535 SELF CARE MNGMENT TRAINING: CPT | Mod: GO

## 2023-01-20 PROCEDURE — 80053 COMPREHEN METABOLIC PANEL: CPT | Performed by: INTERNAL MEDICINE

## 2023-01-20 PROCEDURE — 250N000013 HC RX MED GY IP 250 OP 250 PS 637: Performed by: INTERNAL MEDICINE

## 2023-01-20 PROCEDURE — 250N000013 HC RX MED GY IP 250 OP 250 PS 637: Performed by: PHYSICIAN ASSISTANT

## 2023-01-20 PROCEDURE — 99239 HOSP IP/OBS DSCHRG MGMT >30: CPT | Performed by: INTERNAL MEDICINE

## 2023-01-20 PROCEDURE — 36415 COLL VENOUS BLD VENIPUNCTURE: CPT | Performed by: INTERNAL MEDICINE

## 2023-01-20 RX ADMIN — ATORVASTATIN CALCIUM 20 MG: 20 TABLET, FILM COATED ORAL at 08:41

## 2023-01-20 RX ADMIN — ASPIRIN 325 MG ORAL TABLET 325 MG: 325 PILL ORAL at 08:41

## 2023-01-20 RX ADMIN — OXYCODONE HYDROCHLORIDE 5 MG: 5 TABLET ORAL at 01:46

## 2023-01-20 RX ADMIN — LISINOPRIL 10 MG: 10 TABLET ORAL at 08:41

## 2023-01-20 ASSESSMENT — ACTIVITIES OF DAILY LIVING (ADL)
ADLS_ACUITY_SCORE: 45

## 2023-01-20 NOTE — PROGRESS NOTES
Orthopedic Surgery  Khloe Sparks  01/20/2023     Admit Date:  1/15/2023    POD: 4 Days Post-Op   Procedure(s):  OPEN REDUCTION INTERNAL FIXATION, FRACTURE, FEMUR, PROXIMAL    Patient with dementia. Answers some yes or no questions.  Patient resting comfortably in bed.    Pain seemingly well-controlled.   Able to move around in bed without apparent pain.  No numbness or tingling.  Tolerating modified diet (easy to chew/mod thick).  No vomiting.  Afebrile.  No acute events overnight.    Temp:  [97.8  F (36.6  C)-98.3  F (36.8  C)] 98.3  F (36.8  C)  Pulse:  [69-76] 76  Resp:  [16] 16  BP: (115-163)/(63-87) 115/83  SpO2:  [94 %-96 %] 94 %    Alert.   Right hip dressing is clean, dry, and intact.   No erythema of the surrounding skin.   Bilateral calves are soft, non-tender.  Right lower extremity is NVI.  Sensation intact bilateral lower extremities.  Patient able to actively dorsi and plantar flex the ankles, bilaterally, on command.  DP pulse palpable.    Labs:  Recent Labs   Lab Test 01/19/23  0645 01/18/23  0729 01/17/23  0609 01/15/23  1836   WBC 8.3 11.0  --  7.5   HGB 13.4 12.5 12.2 14.4    142*  --  194     A/P    1. S/p ORIF of the right femoral neck fracture with FNS system   Continue  mg daily x 6 weeks for DVT prophylaxis.     Mobilize with PT/OT    WBAT RLE with walker x 6 weeks.     Continue current pain regimen.   Dressings: Keep intact. Okay to change if peeling or >50% saturation.   Follow-up: 2 weeks post-op with Dr. Greg Mcguire    2. Disposition   Anticipate d/c to TCU when medically cleared and progressing in PT. Ortho stable.    Adilia Sarkar PA-C  [facility]

## 2023-01-20 NOTE — PROGRESS NOTES
Care Management Discharge Note    Discharge Date: 01/20/2023       Discharge Disposition: Skilled Nursing Facility    Discharge Services:      Discharge DME:      Discharge Transportation: health plan transportation    Private pay costs discussed: transportation costs    PAS Confirmation Code: [id number]  Patient/family educated on Medicare website which has current facility and service quality ratings:      Education Provided on the Discharge Plan:    Persons Notified of Discharge Plans: yes  Patient/Family in Agreement with the Plan: yes    Handoff Referral Completed: No    Additional Information:  Patient accepted to [facility] today. Patient will transport via stretcher at 1715, PCS completed and faxed. Discharge orders received for discharge today and faxed to [facility]. Writer confirmed ride time with facility, patient and family. Call placed to oscar Lowry and he is aware.    PAS completed.     PAS-RR    D: Per DHS regulation, SW completed and submitted PAS-RR to [place] [employer] Direct Connect via the Senior LinkAge Line.  PAS-RR confirmation # is : [id number]    I: SW spoke with son and they are aware a PAS-RR has been submitted.  SW reviewed with son that they may be contacted for a follow up appointment within 10 days of hospital discharge if their SNF stay is < 30 days.  Contact information for [facility] LinkAge Line was also provided.    A: son verbalized understanding.    P: Further questions may be directed to [facility] LinkAge Line at #[phone], option #4 for PAS-RR staff.            JASON Lucas

## 2023-01-20 NOTE — PLAN OF CARE
Alert to self only.VSS, room air. Up with 1 assist/walker. Schedule tylenol for pain. Incontinence of bowel and bladder. Pills crushed with puddings. Easy chew with thickened liquid level 3. Turn and repositioned Q 2hrs, Dreg to right hip CDI. TCU pending.

## 2023-01-20 NOTE — PLAN OF CARE
Alert to self only. VSS,Pain control with schedule tylenol. Up with 1 assist/walker. Aquacel dreg to right hip CDI. Up on chair, family at bedside. Incontinence of bladder. Tolerating easy shew  diet. Pt is discharging to TCU at 1715.

## 2023-01-20 NOTE — DISCHARGE SUMMARY
[facility]  Hospitalist Discharge Summary      Date of Admission:  1/15/2023  Date of Discharge:  1/20/2023  Discharging Provider: Teri Martinez MD  Discharge Service: Hospitalist Service    Discharge Diagnoses   Mechanical fall  Acute traumatic right femoral neck fracture from fall  Incidental right MCA bifurcation aneurysm  Incidental cervical foraminal stenosis at L4-L5  Incidental cholelithiasis  Incidental uterine lesion  Hypertension  Alzheimer's disease with dementia  Dyslipidemia  Glaucoma    Follow-ups Needed After Discharge   Follow-up Appointments     Follow Up and recommended labs and tests      Follow-up with Dr. Mcguire ([facility]) at 2 weeks postop for   reevaluation and wound check if applicable. No need for follow up labs or   testing prior to this appointment.     Please contact Dr. Mcguire's care coordinator, Roberta, at [phone] to   schedule or for any questions related to your orthopedic injury/surgery.    [facility] [place] After Hours Phone: [phone]         Follow Up and recommended labs and tests      Follow up with Nursing home physician.  LFTs in 2 to 3 days.  Surgical   referral if LFTs persistently elevated and/or patient starts developing   symptoms related to cholelithiasis  Evaluation of bone health to be done outpatient  Follow-up with neuroradiology team next available to follow-up on your   saccular aneurysm  Orthospine evaluation outpatient for incidental spinal canal stenosis at   L4-L5  Outpatient follow-up with gynecology for incidental uterine lesion             Unresulted Labs Ordered in the Past 30 Days of this Admission     No orders found from 12/16/2022 to 1/16/2023.        Discharge Disposition   Discharged to short-term care facility  Condition at discharge: Stable    Hospital Course   Khloe Sparks is a 87 year old  female with medical history of hypertension, dyslipidemia, Alzheimer's disease, possible dementia  brought into the ED by her son after a fall.     Acute traumatic right femoral neck fracture   Mechanical fall  History of left femoral neck fracture due to mechanical fall 6/2022  Per report patient [place] the mall, patient had fall that was witnessed.  Subsequently complained of right hip pain.    - X-ray right knee no fractures.  X-ray right hip and pelvis with acute nondisplaced minimally impacted subcapital right femoral neck fracture where there is focal cortical offset. CT head no acute intracranial process. CT lumbar spine no acute lumbar spine fracture.  Orthopedic surgery followed while in-house  -S/p ORIF of the right femoral neck fracture with FNS system by Dr. Mcguire on 1/16/23.  -DVT prophylaxis with aspirin 325 mg daily.  Needs outpatient bone health evaluation, vitamin D, TSH as outpatient.  PT/OT following, recommending TCU, patient being discharged to TCU     Incidental right MCA bifurcation aneurysm  CT head with 5 mm inferiorly projecting right MCA bifurcation aneurysm.    Head CTA with 6 mm saccular aneurysm, radiology recommending neuro endovascular consultation  -No acute neurological issues currently  -Endovascular neuroradiology recommended outpatient clinic visit, arrangements to be done as per the neuroloradiology team     Incidental spinal canal stenosis at L4-L5  CT lumbar spine with circumferential bulge with severe canal stenosis at L4-L5.  Rehab evaluation after surgery.  Will need to follow-up with orthospine as outpatient.     Incidental cholelithiasis  CT imaging with cholelithiasis. No right upper quadrant tenderness.  No report of vomiting.  -Patient tolerating oral intake, LFTs slightly abnormal but patient currently remains asymptomatic, LFTs on the improving trend  -Outpatient evaluation, surgical referral if symptomatic or labs getting worse.     Incidental uterine lesion  CT with suggestion of a partially imaged uterine lesion which is also seen on  06/04/2022.  -Outpatient GYN follow-up.       Hypertension  PTA aspirin temporarily increased for DVT prophylaxis as noted above. Can resume PTA dose when she completes the course for DVT prophylaxis.  PTA lisinopril, currently on hold  Few readings of elevated blood pressure over the last 24 hours  Resume PTA lisinopril with holding parameters  As needed IV hydralazine available.     History of Alzheimer's disease, dementia  Resume PTA donepezil.  Delirium precautions.  Minimize narcotic use.     Dyslipidemia    Continue PTA Lipitor.     Glaucoma    Continue PTA eyedrops.      Consultations This Hospital Stay   CARE MANAGEMENT / SOCIAL WORK IP CONSULT  ORTHOPEDIC SURGERY IP CONSULT  PHYSICAL THERAPY ADULT IP CONSULT  OCCUPATIONAL THERAPY ADULT IP CONSULT  SPEECH LANGUAGE PATH ADULT IP CONSULT  PHYSICAL THERAPY ADULT IP CONSULT  OCCUPATIONAL THERAPY ADULT IP CONSULT  CARE MANAGEMENT / SOCIAL WORK IP CONSULT  NEUROLOGY IP STROKE CONSULT    Code Status   Full Code    Time Spent on this Encounter   ITeri MD, personally saw the patient today and spent greater than 30 minutes discharging this patient.       Teri Martinez MD  [facility]  [address] [place] [id number]  Phone: [phone]  Fax: [phone]  ______________________________________________________________________    Physical Exam   Vital Signs: Temp: 98.3  F (36.8  C) Temp src: Oral BP: 115/83 Pulse: 76   Resp: 16 SpO2: 94 % O2 Device: None (Room air)    Weight: 122 lbs 0 oz  Exam:  Constitutional: Awake, alert and no distress. Appears comfortable  Head: Normocephalic. No masses, lesions, tenderness or abnormalities  ENT: ENT exam normal, no neck nodes or sinus tenderness  Cardiovascular: RRR.  2+ murmurs, no rubs or JVD  Respiratory: Normal WOB,b/l equal air entry, no wheezes or crackles   Gastrointestinal: Abdomen soft, non-tender. BS normal. No masses, organomegaly  : Deferred  Extremities : No edema  , no clubbing or cyanosis         Primary Care Physician   Viri Akbar    Discharge Orders      General info for SNF    Length of Stay Estimate: Short Term Care: Estimated # of Days <30  Condition at Discharge: Stable  Level of care:skilled   Rehabilitation Potential: Good  Admission H&P remains valid and up-to-date: Yes  Recent Chemotherapy: N/A  Use Nursing Home Standing Orders: Yes     Mantoux instructions    Give two-step Mantoux (PPD) Per Facility Policy Yes     Follow Up and recommended labs and tests    Follow-up with Dr. Mcguire ([facility]) at 2 weeks postop for reevaluation and wound check if applicable. No need for follow up labs or testing prior to this appointment.     Please contact Dr. Mcguire's care coordinator, Roberta, at [phone] to schedule or for any questions related to your orthopedic injury/surgery.    [facility] [place] After Hours Phone: [phone]     Reason for your hospital stay    S/p ORIF of the right femoral neck fracture with FNS system     Wound care (specify)    Please leave dressing intact for 2 weeks postoperatively. Okay to change if saturated >50% or peeling. Okay to shower. No soaking or submersion. Please contact your orthopedic surgical team if persistent drainage or redness develops around the surgical site.     Activity - Up with assistive device     Activity - Up with nursing assistance     Weight bearing status    WBAT RLE     Follow Up and recommended labs and tests    Follow up with Nursing home physician.   Follow-up with [facility] End[facility] team next available to follow-up on your saccular aneurysm     General info for SNF    Length of Stay Estimate: Short Term Care: Estimated # of Days <30  Condition at Discharge: Stable  Level of care:skilled   Rehabilitation Potential: Fair  Admission H&P remains valid and up-to-date: Yes  Recent Chemotherapy: N/A  Use Nursing Home Standing Orders: Yes     Wound care (specify)    Site: Right femoral  surgical site  Instructions: As advised     Activity - Up with assistive device     Full Code    As documented during admission     Physical Therapy Adult Consult    Evaluate and treat as clinically indicated.    Reason: S/p ORIF of the right femoral neck fracture with FNS system     Occupational Therapy Adult Consult    Evaluate and treat as clinically indicated.    Reason: S/p ORIF of the right femoral neck fracture with FNS system     Fall precautions     Fall precautions     Diet    Follow this diet upon discharge: Orders Placed This Encounter      Room Service      Easy to Chew Diet (level 7) Liquidized/Moderately Thick (level 3)       Significant Results and Procedures   Results for orders placed or performed during the hospital encounter of 01/15/23   Head CT w/o contrast    Narrative    EXAM: CT HEAD W/O CONTRAST  LOCATION: [facility]  DATE/TIME: 1/15/2023 5:12 PM    INDICATION:  Trauma, fall from standing  COMPARISON: None.  TECHNIQUE: Routine CT Head without IV contrast. Multiplanar reformats. Dose reduction techniques were used.    FINDINGS:  INTRACRANIAL CONTENTS: No intracranial hemorrhage, extraaxial collection, or mass effect.  No CT evidence of acute infarct. Mild presumed chronic small vessel ischemic changes. Moderate generalized volume loss. No hydrocephalus. 5 mm inferiorly   projecting right MCA bifurcation aneurysm.    VISUALIZED ORBITS/SINUSES/MASTOIDS: Prior bilateral cataract surgery. Visualized portions of the orbits are otherwise unremarkable. No paranasal sinus mucosal disease. No middle ear or mastoid effusion.    BONES/SOFT TISSUES: No acute abnormality.      Impression    IMPRESSION:  1.  No acute intracranial process.  2.  5 mm inferiorly projecting right MCA bifurcation aneurysm. This would be better assessed by a CTA head.   Lumbar spine CT w/o contrast    Narrative    EXAM: CT LUMBAR SPINE W/O CONTRAST  LOCATION: [facility]OS[facility]  DATE/TIME: 1/15/2023 5:12 PM    INDICATION:  Trauma, fall from standing  COMPARISON:  CT pelvis 06/04/2022.  TECHNIQUE: Routine CT Lumbar Spine without IV contrast. Multiplanar reformats. Dose reduction techniques were used.     FINDINGS:  VERTEBRA: Normal vertebral body heights. Minimal degenerative anterolisthesis of L4 on L5. No fracture or posttraumatic subluxation.     CANAL/FORAMINA:  Circumferential bulge with severe canal stenosis at L4-L5. No significant foraminal stenosis.    PARASPINAL:  Cholelithiasis. Suggestion of a partially imaged uterine lesion.      Impression    IMPRESSION:  1.  No acute lumbar spine fracture.  2.  Circumferential bulge with severe canal stenosis at L4-L5.  3.  Cholelithiasis.  4.  Suggestion of a partially imaged uterine lesion which is also seen on 06/04/2022. Gynecological follow-up is recommended.   XR Knee Right 3 Views    Narrative    EXAM: XR KNEE RIGHT 3 VIEWS  LOCATION: [facility]  DATE/TIME: 1/15/2023 4:55 PM    INDICATION: right knee pain from fall  COMPARISON: None.      Impression    IMPRESSION: No fracture. No effusion. Mild medial and lateral compartment narrowing.   XR Pelvis w Hip Right 1 View    Narrative    EXAM: XR PELVIS AND HIP RIGHT 1 VIEW  LOCATION: [facility]  DATE/TIME: 1/15/2023 4:58 PM    INDICATION: right hip pain from fall  COMPARISON: 06/04/2022.      Impression    IMPRESSION: Acute nondisplaced minimally impacted subcapital right femoral neck fracture where there is focal cortical offset.     Bones are demineralized. Minimal degenerative changes right hip.    Contralateral left total hip replacement.    NOTE: ABNORMAL REPORT    THE DICTATION ABOVE DESCRIBES AN ABNORMALITY FOR WHICH FOLLOW-UP IS NEEDED.    XR Chest Port 1 View    Narrative    EXAM: XR CHEST PORT 1 VIEW  LOCATION: [facility]  DATE/TIME: 1/15/2023 10:37 PM    INDICATION: Status post fall.   Preop  COMPARISON: None.      Impression    IMPRESSION: Heart size and pulmonary vascularity normal. Linear strand of fibrosis or atelectasis left base, lungs otherwise clear.   XR Femur Right 2 Views    Narrative    RIGHT FEMUR TWO VIEWS  1/16/2023 11:41 AM     INDICATION: Right proximal femur fracture.     COMPARISON: 1/15/2023.      Impression    IMPRESSION:  1.  Transverse minimally impacted fracture of the right femoral neck.  No significant change in alignment or orientation.  2.  Normal right hip joint spacing and alignment.  3.  Normal right knee alignment.  4.  Atherosclerotic calcification.    PARISH CHAPA MD         SYSTEM ID:  [id number]   XR Surgery ODESSA L/T 5 Min Fluoro w Stills    Narrative    SURGERY C-ARM FLUORO LESS THAN 5 MIN W STILLS January 16, 2023 2:45 PM      HISTORY: ORIF Femur fracture proximal [id number].    COMPARISON: None.      Impression    IMPRESSION: Two spot fluoroscopic images demonstrate surgical plate  and screws as well as intertrochanteric screws through proximal femur.  Limited osseous assessment given intraoperative radiographs. Total  fluoroscopic time is 0.8 minutes.    MARZENA MARTINS MD         SYSTEM ID:  [id number]   XR Femur Port Right 2 Views    Narrative    EXAM: XR FEMUR PORT RIGHT 2 VIEWS  LOCATION: [facility]  DATE/TIME: 1/16/2023 5:22 PM    INDICATION: Status post Hip surgery  COMPARISON: None.      Impression    IMPRESSION: Status post ORIF of a subcapital right femoral neck fracture, in essentially anatomic alignment. Hardware appears intact and well seated.   XR Video Swallow with SLP or OT    Narrative    VIDEO SWALLOWING EVALUATION January 18, 2023 10:51 AM     HISTORY: Rule out undiagnosed baseline dysphagia.    COMPARISON: None.    FLUOROSCOPY TIME: 1.4 minutes.  SPOT IMAGES OR CINE RUNS: 10.      Impression    IMPRESSION:    Slightly thick: Silent aspiration.    Mildly thick: Silent aspiration.    Moderately thick: No penetration  or aspiration.    Puree: No penetration or aspiration. Moderate residue in the  vallecula.    Semi-Solid: No penetration or aspiration. Moderate residue in the  vallecula.    This study only includes the cervical esophagus.    MARIAM MEDINA MD         SYSTEM ID:  [id number]   CTA Head with Contrast    Narrative    CT ANGIOGRAM OF THE HEAD WITH CONTRAST  1/18/2023 3:03 PM     HISTORY: Assess 5 mm inferiorly projecting right MCA bifurcation  aneurysm noted on CT head earlier this admission.     TECHNIQUE:  CT angiography with an injection of 75mL [id number]-[id number] IV  with scans through the head. Images were transferred to a separate 3-D  workstation where multiplanar reformations and 3-D images were  created. Radiation dose for this scan was reduced using automated  exposure control, adjustment of the mA and/or kV according to patient  size, or iterative reconstruction technique.    COMPARISON: Head CT 1/15/2023.     FINDINGS: A 6 mm saccular aneurysm is present at the right middle  cerebral artery bifurcation. No other aneurysms are identified. The  vertebrobasilar system is small in caliber in the setting of fetal  origin of the bilateral posterior cerebral arteries, considered to be  normal variant. No evidence of large vessel occlusion, high-grade  stenosis, or vascular malformation. Scattered atherosclerotic plaquing  with multiple mild stenoses.      Impression    IMPRESSION: A 6 mm saccular aneurysm at the right middle cerebral  artery bifurcation. Recommend neuroendovascular consultation.     LISANDRO UPTON MD         SYSTEM ID:  [id number]       Discharge Medications   Current Discharge Medication List      START taking these medications    Details   aspirin (ASA) 325 MG tablet Take 1 tablet (325 mg) by mouth daily for 42 days  Qty: 42 tablet, Refills: 0    Associated Diagnoses: Closed displaced fracture of right femoral neck (H)      ondansetron (ZOFRAN ODT) 4 MG ODT tab Take 1 tablet (4 mg) by mouth every 6 hours as  needed for nausea or vomiting  Qty: 5 tablet, Refills: 0    Associated Diagnoses: Closed displaced fracture of right femoral neck (H)      oxyCODONE (ROXICODONE) 5 MG tablet Take 1 tablet (5 mg) by mouth every 4 hours as needed for severe pain (7-10)  Qty: 20 tablet, Refills: 0    Associated Diagnoses: Closed displaced fracture of right femoral neck (H)      senna-docusate (SENOKOT-S/PERICOLACE) 8.6-50 MG tablet Take 1 tablet by mouth 2 times daily  Qty: 14 tablet, Refills: 0    Associated Diagnoses: Closed displaced fracture of right femoral neck (H)         CONTINUE these medications which have CHANGED    Details   acetaminophen (TYLENOL) 325 MG tablet Take 2 tablets (650 mg) by mouth every 4 hours as needed for other (For optimal non-opioid multimodal pain management to improve pain control.)  Qty: 100 tablet, Refills: 0    Associated Diagnoses: Closed displaced fracture of right femoral neck (H)         CONTINUE these medications which have NOT CHANGED    Details   atorvastatin (LIPITOR) 40 MG tablet Take 0.5 tablets (20 mg) by mouth daily  Qty: 90 tablet, Refills: 3    Associated Diagnoses: Hyperlipidemia LDL goal <100      donepezil (ARICEPT) 5 MG tablet Take 1 tablet (5 mg) by mouth At Bedtime  Qty: 90 tablet, Refills: 3    Associated Diagnoses: Alzheimer's disease (H)      dorzolamide-timolol (COSOPT) 2-0.5 % ophthalmic solution Place 1 drop Into the left eye 2 times daily  Refills: 1      latanoprost (XALATAN) 0.005 % ophthalmic solution instill 1 drop in both eyes at bedtime  Refills: 3      lisinopril (ZESTRIL) 10 MG tablet Take 1 tablet (10 mg) by mouth daily  Qty: 90 tablet, Refills: 3    Associated Diagnoses: Primary hypertension      multivitamin (THERMEMS) TABS Take 1 tablet by mouth daily      polyethylene glycol (MIRALAX) 17 GM/Dose powder Take 17 g by mouth daily  Qty: 510 g, Refills: 3    Associated Diagnoses: Constipation, unspecified constipation type      triamcinolone (KENALOG) 0.1 % external  cream Apply topically 2 times daily as needed (eczema spots on legs)  Qty: 80 g, Refills: 1    Associated Diagnoses: Rash         STOP taking these medications       aspirin (ASA) 81 MG EC tablet Comments:   Reason for Stopping:             Allergies   No Known Allergies

## 2023-01-20 NOTE — PLAN OF CARE
Goal Outcome Evaluation:        Patient vital signs are at baseline: Yes, VSS. A&O to self/baseline dementia Turn/repo.   Patient able to ambulate as they were prior to admission or with assist devices provided by therapies during their stay:  No,  Reason: UP with gait belt and walker, PT following, not at baseline. Plan to discharge to TCU   Patient MUST void prior to discharge:  Yes, voiding incontinent   Patient able to tolerate oral intake:  Yes, easy chew, moderately thick liquids diet  Pain has adequate pain control using Oral analgesics:  Yes, prn oxycodone given 5mg for pain 6/10 was effective, prn tylenol available.   Does patient have an identified :  Yes  Has goal D/C date and time been discussed with patient:  Yes, TCU discharge pending today

## 2023-01-21 NOTE — PLAN OF CARE
"Physical Therapy Discharge Summary    Reason for therapy discharge:    Discharged to transitional care facility.    Progress towards therapy goal(s). See goals on Care Plan in [facility] electronic health record for goal details.  Goals not met.  Barriers to achieving goals:   discharge from facility.    Therapy recommendation(s):    \"Patient present below reported baseline, some difficulty determining PLOF given baseline dementia.  Patient able to perform bed mobility, transfers, and short distance gait with Ax1.  Pt w/ increasing difficulty following commands today. Recommend TCU to increase strength and functional mobility. In order to return home, pt would have to navigate stairs, have 24/7 Ax1 available from family, and home with  PT to progress safety and independence with functional mobility tasks.\"     **Pt not seen by discharging therapist, summary written according to documentation from prior therapy sessions        "

## 2023-01-21 NOTE — PLAN OF CARE
Patient vital signs are at baseline: Yes  Patient able to ambulate as they were prior to admission or with assist devices provided by therapies during their stay:  No,  Reason:  Ax1 GB and walker, discharge to TCU  Patient MUST void prior to discharge:  Yes  Patient able to tolerate oral intake:  Yes  Pain has adequate pain control using Oral analgesics:  Yes, no PRNs given  Does patient have an identified :  No,  Reason:  going to TCU  Has goal D/C date and time been discussed with patient:  Yes to TCU     AVS printed and sent with transport in addition to scripts, patient changed, IV removed, belongings packed and sent, report given to transport patient discharged at 1745 via stretcher.

## 2023-01-21 NOTE — PLAN OF CARE
Occupational Therapy Discharge Summary    Reason for therapy discharge:    Discharged to transitional care facility.    Progress towards therapy goal(s). See goals on Care Plan in [facility] electronic health record for goal details.  Goals not met.  Barriers to achieving goals:   discharge from facility.    Therapy recommendation(s):    Continued therapy is recommended.  Rationale/Recommendations:  pt is below baseline, would benefit from TCU at discharge to address deficits in ADLs and functional mobility.

## 2023-01-23 ENCOUNTER — PATIENT OUTREACH (OUTPATIENT)
Dept: CARE COORDINATION | Facility: CLINIC | Age: 88
End: 2023-01-23

## 2023-01-23 ENCOUNTER — TRANSITIONAL CARE UNIT VISIT (OUTPATIENT)
Dept: GERIATRICS | Facility: CLINIC | Age: 88
End: 2023-01-23
Payer: MEDICARE

## 2023-01-23 VITALS
WEIGHT: 135 LBS | DIASTOLIC BLOOD PRESSURE: 67 MMHG | BODY MASS INDEX: 21.19 KG/M2 | SYSTOLIC BLOOD PRESSURE: 120 MMHG | TEMPERATURE: 97.7 F | RESPIRATION RATE: 18 BRPM | OXYGEN SATURATION: 97 % | HEIGHT: 67 IN | HEART RATE: 98 BPM

## 2023-01-23 DIAGNOSIS — F02.80 ALZHEIMER'S DISEASE (H): ICD-10-CM

## 2023-01-23 DIAGNOSIS — K80.20 CALCULUS OF GALLBLADDER WITHOUT CHOLECYSTITIS WITHOUT OBSTRUCTION: ICD-10-CM

## 2023-01-23 DIAGNOSIS — R53.81 PHYSICAL DECONDITIONING: ICD-10-CM

## 2023-01-23 DIAGNOSIS — I10 HYPERTENSION, UNSPECIFIED TYPE: ICD-10-CM

## 2023-01-23 DIAGNOSIS — S72.002D CLOSED FRACTURE OF NECK OF LEFT FEMUR WITH ROUTINE HEALING, SUBSEQUENT ENCOUNTER: ICD-10-CM
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
## 2023-01-23 DIAGNOSIS — R54 FRAIL ELDERLY: ICD-10-CM

## 2023-01-23 DIAGNOSIS — S72.001A CLOSED FRACTURE OF NECK OF RIGHT FEMUR, INITIAL ENCOUNTER (H): Primary | ICD-10-CM

## 2023-01-23 DIAGNOSIS — E78.5 HYPERLIPIDEMIA, UNSPECIFIED HYPERLIPIDEMIA TYPE: ICD-10-CM

## 2023-01-23 DIAGNOSIS — M48.061 SPINAL STENOSIS OF LUMBAR REGION, UNSPECIFIED WHETHER NEUROGENIC CLAUDICATION PRESENT: ICD-10-CM

## 2023-01-23 DIAGNOSIS — M62.81 GENERALIZED MUSCLE WEAKNESS: ICD-10-CM

## 2023-01-23 DIAGNOSIS — G30.9 ALZHEIMER'S DISEASE (H): ICD-10-CM

## 2023-01-23 DIAGNOSIS — R09.81 NASAL CONGESTION: ICD-10-CM

## 2023-01-23 DIAGNOSIS — J34.89 RHINORRHEA: ICD-10-CM

## 2023-01-23 DIAGNOSIS — N85.9 LESION OF UTERUS: ICD-10-CM

## 2023-01-23 DIAGNOSIS — K59.01 SLOW TRANSIT CONSTIPATION: ICD-10-CM

## 2023-01-23 PROCEDURE — 99310 SBSQ NF CARE HIGH MDM 45: CPT

## 2023-01-23 NOTE — PROGRESS NOTES
Clinic Care Coordination Contact  Care Coordination Transition Communication    Referral Source: IP Report    Clinical Data: Patient was hospitalized at [facility] from 1/15/2023 to 1/20/2023 with diagnosis of Mechanical fall, acute traumatic right femoral neck fracture from fall, incidental right MCA bifurcation aneurysm, incidental cholelithiasis, HTN Alzheimers disease with dementia.     Transition to Facility:              Facility Name: [facility] phone number/fax: [phone]    Plan: RN/SW Care Coordinator will await notification from facility staff informing RN/SW Care Coordinator of patient's discharge plans/needs. RN/SW Care Coordinator will review chart and outreach to facility staff every 4 weeks and as needed.      Tanvi Peña RN, BSN, PHN  Primary Care / Care Coordinator   [facility]  E-mail [email or web address]   [phone]

## 2023-01-23 NOTE — PROGRESS NOTES
[facility] GERIATRICS    PRIMARY CARE PROVIDER AND CLINIC:  iVri Akbar MD, [address] / [place] [address]  Chief Complaint   Patient presents with     [facility] Medical Record Number:  [id number]  Place of Service where encounter took place:  [facility] ([facility] [[id number]]    Khloe Sparks  is a 87 year old  (1935), admitted to the above facility from  [facility]. Hospital stay 1/15/23 through 1/20/23..     HPI:    Past medical history includes hypertension, dyslipidemia, repeated falls, and Alzheimer dementia presented to the hospital following a fall.  Found to have acute traumatic right femoral neck fracture now s/p ORIF.  Of note, she also sustained left femoral neck fracture following a fall in 6/2022 and s/p left hip hemiarthroplasty on 6/5/22.  CT head and lumbar spine negative for acute changes at this admission.  Recommended to have bone health evaluation as outpatient.  Incidentally found to have right MCA bifurcation aneurysm, spinal canal stenosis at L4-L5, cholelithiasis, and uterine lesion-recommended outpatient follow-up.  Patient discharged to [facility] on stable condition for rehab.    She is being seen today for initial TCU visit.  History limited secondary to underlying cognitive deficits.  Forceful speech.  Denies pain.  Incision covered and intact.  Already ambulating with rehab.  No falls in TCU.  Incision covered and intact.  No signs of complications.  Noted to have respiratory congestion and runny nose.  She denies chest pain or shortness of breath.  Vitals stable.    CODE STATUS/ADVANCE DIRECTIVES DISCUSSION:  Full Code    ALLERGIES: No Known Allergies   PAST MEDICAL HISTORY:   Past Medical History:   Diagnosis Date     Dyslipidemia      Hypertension       PAST SURGICAL HISTORY:   has a past surgical history that includes Pr Closed Rx Mid Humerus Fracture; Pr Closed Rx Dist  Rad/Ulna Fx,Manipul; REMOVE TONSILS/ADENOIDS,<13 Y/O; Open reduction internal fixation hip unipolar (Left, 6/5/2022); and Open reduction internal fixation femur proximal (Right, 1/16/2023).  FAMILY HISTORY: family history includes Alzheimer Disease in her sister; Breast Cancer in her sister; Cancer in her brother; Multiple Sclerosis in her mother; Pancreatic Cancer in her father.  SOCIAL HISTORY:   reports that she quit smoking about 47 years ago. She has never used smokeless tobacco. She reports current alcohol use. She reports that she does not use drugs.  Patient's living condition: lives with family, adult son     Post TRAM her as needed and scheduled ASAP medication Reconciliation Status:   MED REC REQUIRED{ Post Medication Reconciliation Status:  Discharge medications reconciled, continue medications without change    Current Outpatient Medications   Medication Sig     acetaminophen (TYLENOL) 325 MG tablet Take 2 tablets (650 mg) by mouth every 4 hours as needed for other (For optimal non-opioid multimodal pain management to improve pain control.)     aspirin (ASA) 325 MG tablet Take 1 tablet (325 mg) by mouth daily for 42 days     atorvastatin (LIPITOR) 40 MG tablet Take 0.5 tablets (20 mg) by mouth daily     donepezil (ARICEPT) 5 MG tablet Take 1 tablet (5 mg) by mouth At Bedtime     dorzolamide-timolol (COSOPT) 2-0.5 % ophthalmic solution Place 1 drop Into the left eye 2 times daily     latanoprost (XALATAN) 0.005 % ophthalmic solution instill 1 drop in both eyes at bedtime     lisinopril (ZESTRIL) 10 MG tablet Take 1 tablet (10 mg) by mouth daily     multivitamin (THERMEMS) TABS Take 1 tablet by mouth daily     ondansetron (ZOFRAN ODT) 4 MG ODT tab Take 1 tablet (4 mg) by mouth every 6 hours as needed for nausea or vomiting     oxyCODONE (ROXICODONE) 5 MG tablet Take 1 tablet (5 mg) by mouth every 4 hours as needed for severe pain (7-10)     polyethylene glycol (MIRALAX) 17 GM/Dose powder Take 17 g by  "mouth daily     senna-docusate (SENOKOT-S/PERICOLACE) 8.6-50 MG tablet Take 1 tablet by mouth 2 times daily     triamcinolone (KENALOG) 0.1 % external cream Apply topically 2 times daily as needed (eczema spots on legs)     No current facility-administered medications for this visit.     ROS:  Limited secondary to cognitive impairment but today pt reports no pain.     Vitals:  /67   Pulse 98   Temp 97.7  F (36.5  C)   Resp 18   Ht 1.702 m (5' 7\")   Wt 61.2 kg (135 lb)   LMP  (LMP Unknown)   SpO2 97%   BMI 21.14 kg/m       Exam:  GENERAL APPEARANCE: Forceful speech, respiratory congestion, well groomed  HEENT-EARS: No discharge/drainage  HEENT-NECK: No lymphadenopathy  HEENT-EYES: PERRLA positive, no drainage/discharge  HEENT-NOSE/MOUTH/THROAT: Positive for nasal drainage, mucous membranes moist  RESPIRATORY: Congested, lung sounds coarse  CARDIOVASCULAR: RRR, no peripheral edema, S1/S2 normal  GASTROINTESTINAL: Soft, nontender, nondistended, bowel sounds present x4 quadrants  MUSCULOSKELETAL: Right hip surgical incision covered and intact, no complications  NEUROLOGICAL: Alert to self, memory loss, confusion   PSYCHOLOGICAL: Cooperative, calm     Lab/Diagnostic data:  Recent labs in [facility] reviewed by me today.     ASSESSMENT/PLAN:  Acute traumatic right femoral neck fracture   Hx closed fracture of neck of left femur in 6/2022 post repair   Fall  General weakness  Physical deconditioning  Frail elderly  -S/p ORIF of the right femoral neck fracture with FNS system on 1/16/23, no pain, incision intact  -Reduce oxycodone dose to 2.5 mg every 4 hours as needed-wean off as able, continue Tylenol, continue aspirin for DVT prophylaxis, add lidocaine patch, OT/PT, Ortho follow-up, monitor incision, monitor pain, fall precautions    Rhinorrhea  Congestion  -No chest pain or shortness of breath  -COVID testing, chest x-ray, monitor for changes in respiratory status    Incidental right MCA bifurcation aneurysm on " CT head  -Outpatient endovascular neuroradiology follow-up    Incidental spinal canal stenosis at L4-L5  -Outpatient orthospine follow-up    Incidental cholelithiasis  -No pain, elevated LFTs inpatient  -Recheck LFTs, surgery referral based on lab findings    Incidental uterine lesion  -Outpatient gynecologyl follow-up    Hypertension  -BP stable in TCU on PTA lisinopril    Alzheimer's dementia  -Stable mental and functional status  -Continue PTA donepezil, monitor mood    Hyperlipidemia  -Managed on PTA Lipitor    Glaucoma  -Managed on eyedrops    Slow transit constipation  -Managed on senna and MiraLAX      Orders:  LFTs  CXR  Covid testing   Ortho follow-up  Reduce oxy dose to 2.5 mg every 4 hours as needed  Lidocaine patch  Outpatient endovascular neuroradiology, orthospine, and gynecology follow-up         Electronically signed by:  Karsten Soni,JEEVAN,APRN,AGNP-BC.               The above note was completed in part using Dragon voice recognition software. Although reviewed after completion, some word and grammatical errors may occur. Please contact the author of this note with any questions.

## 2023-01-23 NOTE — LETTER
[facility]   To:   [facility]          Please give to facility    From:   Tanvi Peña RN  Care Coordinator   [facility]   P: [phone]  [email or web address]   Patient Name:  Khloe Sparks YOB: 1935   Admit date: 1/20/2023      *Information Needed:  Please contact me when the patient will discharge (or if they will move to long term care)- include the discharge date, disposition, and main diagnosis   - If the patient is discharged with home care services, please provide the name of the agency    Also- Please inform me if a care conference is being held.   Phone, Fax or Email with information      Tanvi Peña RN, BSN, PHN  Primary Care / Care Coordinator   [facility]  E-mail [email or web address]   [phone]                Thank you

## 2023-01-23 NOTE — LETTER
1/23/2023        RE: Khloe Sparks  [address]  [facility] [id number]        [facility] GERIATRICS    PRIMARY CARE PROVIDER AND CLINIC:  Viri Akbar MD, [address] / [place] [address]  Chief Complaint   Patient presents with     [facility] Medical Record Number:  [id number]  Place of Service where encounter took place:  [facility] (TCU) [[id number]]    Khloe Sparks  is a 87 year old  (1935), admitted to the above facility from  [facility]. Hospital stay 1/15/23 through 1/20/23..     HPI:    Past medical history includes hypertension, dyslipidemia, repeated falls, and Alzheimer dementia presented to the hospital following a fall.  Found to have acute traumatic right femoral neck fracture now s/p ORIF.  Of note, she also sustained left femoral neck fracture following a fall in 6/2022 and s/p left hip hemiarthroplasty on 6/5/22.  CT head and lumbar spine negative for acute changes at this admission.  Recommended to have bone health evaluation as outpatient.  Incidentally found to have right MCA bifurcation aneurysm, spinal canal stenosis at L4-L5, cholelithiasis, and uterine lesion-recommended outpatient follow-up.  Patient discharged to [facility] on stable condition for rehab.    She is being seen today for initial TCU visit.  History limited secondary to underlying cognitive deficits.  Forceful speech.  Denies pain.  Incision covered and intact.  Already ambulating with rehab.  No falls in TCU.  Incision covered and intact.  No signs of complications.  Noted to have respiratory congestion and runny nose.  She denies chest pain or shortness of breath.  Vitals stable.    CODE STATUS/ADVANCE DIRECTIVES DISCUSSION:  Full Code    ALLERGIES: No Known Allergies   PAST MEDICAL HISTORY:   Past Medical History:   Diagnosis Date     Dyslipidemia      Hypertension       PAST SURGICAL HISTORY:   has a  past surgical history that includes Pr Closed Rx Mid Humerus Fracture; Pr Closed Rx Dist Rad/Ulna Fx,Manipul; REMOVE TONSILS/ADENOIDS,<13 Y/O; Open reduction internal fixation hip unipolar (Left, 6/5/2022); and Open reduction internal fixation femur proximal (Right, 1/16/2023).  FAMILY HISTORY: family history includes Alzheimer Disease in her sister; Breast Cancer in her sister; Cancer in her brother; Multiple Sclerosis in her mother; Pancreatic Cancer in her father.  SOCIAL HISTORY:   reports that she quit smoking about 47 years ago. She has never used smokeless tobacco. She reports current alcohol use. She reports that she does not use drugs.  Patient's living condition: lives with family, adult son     Post TRAM her as needed and scheduled ASAP medication Reconciliation Status:   MED REC REQUIRED{ Post Medication Reconciliation Status:  Discharge medications reconciled, continue medications without change    Current Outpatient Medications   Medication Sig     acetaminophen (TYLENOL) 325 MG tablet Take 2 tablets (650 mg) by mouth every 4 hours as needed for other (For optimal non-opioid multimodal pain management to improve pain control.)     aspirin (ASA) 325 MG tablet Take 1 tablet (325 mg) by mouth daily for 42 days     atorvastatin (LIPITOR) 40 MG tablet Take 0.5 tablets (20 mg) by mouth daily     donepezil (ARICEPT) 5 MG tablet Take 1 tablet (5 mg) by mouth At Bedtime     dorzolamide-timolol (COSOPT) 2-0.5 % ophthalmic solution Place 1 drop Into the left eye 2 times daily     latanoprost (XALATAN) 0.005 % ophthalmic solution instill 1 drop in both eyes at bedtime     lisinopril (ZESTRIL) 10 MG tablet Take 1 tablet (10 mg) by mouth daily     multivitamin (THERMEMS) TABS Take 1 tablet by mouth daily     ondansetron (ZOFRAN ODT) 4 MG ODT tab Take 1 tablet (4 mg) by mouth every 6 hours as needed for nausea or vomiting     oxyCODONE (ROXICODONE) 5 MG tablet Take 1 tablet (5 mg) by mouth every 4 hours as needed for  "severe pain (7-10)     polyethylene glycol (MIRALAX) 17 GM/Dose powder Take 17 g by mouth daily     senna-docusate (SENOKOT-S/PERICOLACE) 8.6-50 MG tablet Take 1 tablet by mouth 2 times daily     triamcinolone (KENALOG) 0.1 % external cream Apply topically 2 times daily as needed (eczema spots on legs)     No current facility-administered medications for this visit.     ROS:  Limited secondary to cognitive impairment but today pt reports no pain.     Vitals:  /67   Pulse 98   Temp 97.7  F (36.5  C)   Resp 18   Ht 1.702 m (5' 7\")   Wt 61.2 kg (135 lb)   LMP  (LMP Unknown)   SpO2 97%   BMI 21.14 kg/m       Exam:  GENERAL APPEARANCE: Forceful speech, respiratory congestion, well groomed  HEENT-EARS: No discharge/drainage  HEENT-NECK: No lymphadenopathy  HEENT-EYES: PERRLA positive, no drainage/discharge  HEENT-NOSE/MOUTH/THROAT: Positive for nasal drainage, mucous membranes moist  RESPIRATORY: Congested, lung sounds coarse  CARDIOVASCULAR: RRR, no peripheral edema, S1/S2 normal  GASTROINTESTINAL: Soft, nontender, nondistended, bowel sounds present x4 quadrants  MUSCULOSKELETAL: Right hip surgical incision covered and intact, no complications  NEUROLOGICAL: Alert to self, memory loss, confusion   PSYCHOLOGICAL: Cooperative, calm     Lab/Diagnostic data:  Recent labs in [facility] reviewed by me today.     ASSESSMENT/PLAN:  Acute traumatic right femoral neck fracture   Hx closed fracture of neck of left femur in 6/2022 post repair   Fall  General weakness  Physical deconditioning  Frail elderly  -S/p ORIF of the right femoral neck fracture with FNS system on 1/16/23, no pain, incision intact  -Reduce oxycodone dose to 2.5 mg every 4 hours as needed-wean off as able, continue Tylenol, continue aspirin for DVT prophylaxis, add lidocaine patch, OT/PT, Ortho follow-up, monitor incision, monitor pain, fall precautions    Rhinorrhea  Congestion  -No chest pain or shortness of breath  -COVID testing, chest x-ray, " monitor for changes in respiratory status    Incidental right MCA bifurcation aneurysm on CT head  -Outpatient endovascular neuroradiology follow-up    Incidental spinal canal stenosis at L4-L5  -Outpatient orthospine follow-up    Incidental cholelithiasis  -No pain, elevated LFTs inpatient  -Recheck LFTs, surgery referral based on lab findings    Incidental uterine lesion  -Outpatient gynecologyl follow-up    Hypertension  -BP stable in TCU on PTA lisinopril    Alzheimer's dementia  -Stable mental and functional status  -Continue PTA donepezil, monitor mood    Hyperlipidemia  -Managed on PTA Lipitor    Glaucoma  -Managed on eyedrops    Slow transit constipation  -Managed on senna and MiraLAX      Orders:  LFTs  CXR  Covid testing   Ortho follow-up  Reduce oxy dose to 2.5 mg every 4 hours as needed  Lidocaine patch  Outpatient endovascular neuroradiology, orthospine, and gynecology follow-up         Electronically signed by:  Karsten Soni DNP,APRDARINEL,AGNP-BC.               The above note was completed in part using Dragon voice recognition software. Although reviewed after completion, some word and grammatical errors may occur. Please contact the author of this note with any questions.                         Sincerely,        ARVINDRA Anaya CNP       Myalgia Monitoring: - At this point, patient reports that this is mild and tolerable. Explained this is common when taking isotretinoin. If this worsens, contact us.

## 2023-01-25 ENCOUNTER — MEDICAL CORRESPONDENCE (OUTPATIENT)
Dept: HEALTH INFORMATION MANAGEMENT | Facility: CLINIC | Age: 88
End: 2023-01-25
Payer: MEDICARE

## 2023-01-25 ENCOUNTER — LAB REQUISITION (OUTPATIENT)
Dept: LAB | Facility: CLINIC | Age: 88
End: 2023-01-25
Payer: MEDICARE

## 2023-01-25 DIAGNOSIS — K80.80 OTHER CHOLELITHIASIS WITHOUT OBSTRUCTION: ICD-10-CM

## 2023-01-26 ENCOUNTER — TELEPHONE (OUTPATIENT)
Dept: GERIATRICS | Facility: CLINIC | Age: 88
End: 2023-01-26
Payer: MEDICARE

## 2023-01-26 LAB
ALBUMIN SERPL BCG-MCNC: 3.5 G/DL (ref 3.5–5.2)
ALP SERPL-CCNC: 196 U/L (ref 35–104)
ALT SERPL W P-5'-P-CCNC: 233 U/L (ref 10–35)
AST SERPL W P-5'-P-CCNC: 155 U/L (ref 10–35)
BILIRUB DIRECT SERPL-MCNC: 0.23 MG/DL (ref 0–0.3)
BILIRUB SERPL-MCNC: 0.7 MG/DL
PROT SERPL-MCNC: 6.6 G/DL (ref 6.4–8.3)

## 2023-01-26 PROCEDURE — 36415 COLL VENOUS BLD VENIPUNCTURE: CPT

## 2023-01-26 PROCEDURE — P9604 ONE-WAY ALLOW PRORATED TRIP: HCPCS

## 2023-01-26 PROCEDURE — 82040 ASSAY OF SERUM ALBUMIN: CPT

## 2023-01-27 ENCOUNTER — TRANSCRIBE ORDERS (OUTPATIENT)
Dept: OTHER | Age: 88
End: 2023-01-27

## 2023-01-27 ENCOUNTER — TELEPHONE (OUTPATIENT)
Dept: NEUROSURGERY | Facility: CLINIC | Age: 88
End: 2023-01-27
Payer: MEDICARE

## 2023-01-27 DIAGNOSIS — I67.1 BRAIN ANEURYSM: Primary | ICD-10-CM

## 2023-01-27 NOTE — TELEPHONE ENCOUNTER
Nursing called the heptaic panel in tonight.  Able to see a comparison of the results.    No changes for now.    Did order that another panel to be done next Wednesday    Component      Latest Ref Rng & Units 1/26/2023   Protein Total      6.4 - 8.3 g/dL 6.6   Albumin      3.5 - 5.2 g/dL 3.5   Bilirubin Total      <=1.2 mg/dL 0.7   Alkaline Phosphatase      35 - 104 U/L 196 (H)   AST      10 - 35 U/L 155 (H)   ALT      10 - 35 U/L 233 (H)   Bilirubin Direct      0.00 - 0.30 mg/dL 0.23     Electronically signed by Angelica Santos RN, CNP

## 2023-01-30 ENCOUNTER — TELEPHONE (OUTPATIENT)
Dept: GERIATRICS | Facility: CLINIC | Age: 88
End: 2023-01-30
Payer: MEDICARE

## 2023-01-30 NOTE — TELEPHONE ENCOUNTER
[facility] Geriatrics Lab Note     Provider: RAVINDRA Gary CNP  Facility: [facility] Facility Type:  TCU    No Known Allergies    Labs Reviewed by provider: XR of rt hip and pelvis        Verbal Order/Direction given by Provider: Result communicated to PCP; NNO    Provider giving Order:  RAVINDRA Gary CNP    Verbal Order given to: Jose Tinoco RN

## 2023-01-31 ENCOUNTER — TELEPHONE (OUTPATIENT)
Dept: NEUROSURGERY | Facility: CLINIC | Age: 88
End: 2023-01-31
Payer: MEDICARE

## 2023-01-31 ENCOUNTER — LAB REQUISITION (OUTPATIENT)
Dept: LAB | Facility: CLINIC | Age: 88
End: 2023-01-31
Payer: MEDICARE

## 2023-01-31 DIAGNOSIS — K81.9 CHOLECYSTITIS, UNSPECIFIED: ICD-10-CM

## 2023-02-01 VITALS
BODY MASS INDEX: 21.58 KG/M2 | HEART RATE: 81 BPM | RESPIRATION RATE: 17 BRPM | DIASTOLIC BLOOD PRESSURE: 58 MMHG | TEMPERATURE: 97.6 F | SYSTOLIC BLOOD PRESSURE: 122 MMHG | WEIGHT: 137.5 LBS | OXYGEN SATURATION: 96 % | HEIGHT: 67 IN

## 2023-02-01 LAB
ALBUMIN SERPL BCG-MCNC: 3.4 G/DL (ref 3.5–5.2)
ALP SERPL-CCNC: 186 U/L (ref 35–104)
ALT SERPL W P-5'-P-CCNC: 166 U/L (ref 10–35)
AST SERPL W P-5'-P-CCNC: 142 U/L (ref 10–35)
BILIRUB DIRECT SERPL-MCNC: 0.2 MG/DL (ref 0–0.3)
BILIRUB SERPL-MCNC: 0.7 MG/DL
PROT SERPL-MCNC: 6.2 G/DL (ref 6.4–8.3)

## 2023-02-01 PROCEDURE — P9604 ONE-WAY ALLOW PRORATED TRIP: HCPCS

## 2023-02-01 PROCEDURE — 36415 COLL VENOUS BLD VENIPUNCTURE: CPT

## 2023-02-01 PROCEDURE — 80076 HEPATIC FUNCTION PANEL: CPT

## 2023-02-01 NOTE — PROGRESS NOTES
[facility] GERIATRICS    PRIMARY CARE PROVIDER AND CLINIC:  Viri Akbar MD, [address] / [place] [address]  Chief Complaint   Patient presents with     Hospital F/U      [facility] Medical Record Number:  [id number]  Place of Service where encounter took place:  [facility])     Khloe Sparks  is a 87 year old  (1935), admitted to the above facility from  [facility]. Hospital stay 1/15/23 through 1/20/23..     Hospital course was reviewed by me, is as per the hospital discharge summary and nurse practitioner note    Past medical history of Alzheimer's disease, multiple falls, hypertension.  Patient suffered a right femoral neck fracture following a fall, and underwent a ORIF.  Postoperative course was medically uncomplicated.  She did have soft blood pressures postoperatively for which lisinopril was temporarily held.  Prior to mission aspirin dose was increased temporarily for DVT prophylaxis, per orthopedics.  She was incidentally found to have a 5 mm right MCA bifurcation aneurysm for which outpatient evaluation was recommended.  CT scan revealed a partially imaged uterine lesion, also seen in June 2022.  Outpatient GYN follow-up was recommended.  Finally, the patient was also noted cholelithiasis on CT scan.  LFTs were mildly elevated but stable during hospital course.  The patient had no GI symptoms.    Patient is unable to contribute to history secondary to cognitive deficits.  Nursing staff are not aware of any acute medical issues.      CODE STATUS/ADVANCE DIRECTIVES DISCUSSION:  Full Code  CPR/Full code   ALLERGIES: No Known Allergies   PAST MEDICAL HISTORY:   Past Medical History:   Diagnosis Date     Dyslipidemia      Hypertension       PAST SURGICAL HISTORY:   has a past surgical history that includes Pr Closed Rx Mid Humerus Fracture; Pr Closed Rx Dist Rad/Ulna Fx,Manipul; REMOVE TONSILS/ADENOIDS,<13 Y/O; Open reduction internal  fixation hip unipolar (Left, 6/5/2022); and Open reduction internal fixation femur proximal (Right, 1/16/2023).  FAMILY HISTORY: family history includes Alzheimer Disease in her sister; Breast Cancer in her sister; Cancer in her brother; Multiple Sclerosis in her mother; Pancreatic Cancer in her father.  SOCIAL HISTORY:   reports that she quit smoking about 47 years ago. She has never used smokeless tobacco. She reports current alcohol use. She reports that she does not use drugs.  Patient's living condition: lives with family, son     Current medications were reviewed by me today    Current Outpatient Medications   Medication Sig     acetaminophen (TYLENOL) 325 MG tablet Take 2 tablets (650 mg) by mouth every 4 hours as needed for other (For optimal non-opioid multimodal pain management to improve pain control.)     aspirin (ASA) 325 MG tablet Take 1 tablet (325 mg) by mouth daily for 42 days     atorvastatin (LIPITOR) 40 MG tablet Take 0.5 tablets (20 mg) by mouth daily     donepezil (ARICEPT) 5 MG tablet Take 1 tablet (5 mg) by mouth At Bedtime     dorzolamide-timolol (COSOPT) 2-0.5 % ophthalmic solution Place 1 drop Into the left eye 2 times daily     latanoprost (XALATAN) 0.005 % ophthalmic solution instill 1 drop in both eyes at bedtime     lisinopril (ZESTRIL) 10 MG tablet Take 1 tablet (10 mg) by mouth daily     multivitamin (THERMEMS) TABS Take 1 tablet by mouth daily     ondansetron (ZOFRAN ODT) 4 MG ODT tab Take 1 tablet (4 mg) by mouth every 6 hours as needed for nausea or vomiting     oxyCODONE (ROXICODONE) 5 MG tablet Take 1 tablet (5 mg) by mouth every 4 hours as needed for severe pain (7-10)     polyethylene glycol (MIRALAX) 17 GM/Dose powder Take 17 g by mouth daily     senna-docusate (SENOKOT-S/PERICOLACE) 8.6-50 MG tablet Take 1 tablet by mouth 2 times daily     triamcinolone (KENALOG) 0.1 % external cream Apply topically 2 times daily as needed (eczema spots on legs)     No current  "facility-administered medications for this visit.       ROS:  Unobtainable secondary to cognitive impairment.     Vitals:  /58   Pulse 81   Temp 97.6  F (36.4  C)   Resp 17   Ht 1.702 m (5' 7\")   Wt 62.4 kg (137 lb 8 oz)   LMP  (LMP Unknown)   SpO2 96%   BMI 21.54 kg/m    Exam:  Thin appearing female, sitting in the common area, preparing to eat breakfast.  She appears comfortable.  HEENT: Oral mucosa moist  No eye redness or drainage  Lungs clear  CV RRR  Abdomen soft, nondistended, no right upper quadrant tenderness  Extremities: Right hip incision was not examined by me.  There is no lower extremity edema  Neuro: Alert, oriented to self.  Speech mildly dysarthric.  Face symmetric.  No tremors.    Lab/Diagnostic data:    Most Recent 3 CBC's:Recent Labs   Lab Test 01/19/23  0645 01/18/23  0729 01/17/23  0609 01/15/23  1836   WBC 8.3 11.0  --  7.5   HGB 13.4 12.5 12.2 14.4   MCV 89 86  --  91    142*  --  194     Most Recent 3 BMP's:Recent Labs   Lab Test 01/20/23  0854 01/19/23  0645 01/18/23  1226    138 139   POTASSIUM 3.6 3.7 3.8   CHLORIDE 104 106 105   CO2 23 23 26   BUN 12.3 13.7 18.3   CR 0.56 0.45* 0.53   ANIONGAP 13 9 8   ERICA 9.1 8.5* 8.7*   * 90 125*     Most Recent 2 LFT's:Recent Labs   Lab Test 02/01/23  1022 01/26/23  1046   * 155*   * 233*   ALKPHOS 186* 196*   BILITOTAL 0.7 0.7       ASSESSMENT/PLAN:    Right femoral neck fracture suffered in a mechanical fall, status post ORIF.  Postoperative course medically uncomplicated.  Plan: Therapies, cautious use of pain medications.  Higher strength aspirin for short period per orthopedics then resume 81 mg dose  Orthopedics follow-up    Dementia  Stable mental functional status following injury and surgery  Plan: Therapies.  Continue donepezil.  Cautious use of CNS acting medications    Elevated transaminases, incidental finding of cholelithiasis on CT scan.  No complaints of abdominal pain, exam completely " benign.  Relationship of LFT abnormalities and cholelithiasis not clear in absence of symptoms  Transaminases are trending down  Plan: Continue to monitor liver function tests, GI exam    Incidental right MCA bifurcation aneurysm  Plan: Endovascular neuroradiology follow-up if family desires    Incidental uterine lesion  Etiology unclear  No reported vaginal bleeding  Plan: Outpatient GYN follow-up with patient's family desires    Hypertension  Stable on lisinopril  Plan: Monitor blood pressure, continue blood pressure medications as blood pressure dictates.  Avoid hypotension.      Krishna Steward MD

## 2023-02-02 ENCOUNTER — TRANSITIONAL CARE UNIT VISIT (OUTPATIENT)
Dept: GERIATRICS | Facility: CLINIC | Age: 88
End: 2023-02-02
Payer: MEDICARE

## 2023-02-02 DIAGNOSIS — K80.20 CALCULUS OF GALLBLADDER WITHOUT CHOLECYSTITIS WITHOUT OBSTRUCTION: ICD-10-CM

## 2023-02-02 DIAGNOSIS — I10 HYPERTENSION, UNSPECIFIED TYPE: ICD-10-CM

## 2023-02-02 DIAGNOSIS — N85.9 LESION OF UTERUS: ICD-10-CM

## 2023-02-02 DIAGNOSIS — F02.80 ALZHEIMER'S DISEASE (H): ICD-10-CM

## 2023-02-02 DIAGNOSIS — S72.001A CLOSED FRACTURE OF NECK OF RIGHT FEMUR, INITIAL ENCOUNTER (H): Primary | ICD-10-CM

## 2023-02-02 DIAGNOSIS — G30.9 ALZHEIMER'S DISEASE (H): ICD-10-CM

## 2023-02-02 PROCEDURE — 99305 1ST NF CARE MODERATE MDM 35: CPT | Performed by: INTERNAL MEDICINE

## 2023-02-02 NOTE — LETTER
2/2/2023        RE: Khloe Sparks  [address]  [facility] [address]        [facility] GERIATRICS    PRIMARY CARE PROVIDER AND CLINIC:  Viri Akbar MD, [address] / [place] [address]  Chief Complaint   Patient presents with     Hospital F/U      [place] Medical Record Number:  [id number]  Place of Service where encounter took place:  [facility] ([facility])     Khloe Sparks  is a 87 year old  (1935), admitted to the above facility from  [facility]. Hospital stay 1/15/23 through 1/20/23..     Hospital course was reviewed by me, is as per the hospital discharge summary and nurse practitioner note    Past medical history of Alzheimer's disease, multiple falls, hypertension.  Patient suffered a right femoral neck fracture following a fall, and underwent a ORIF.  Postoperative course was medically uncomplicated.  She did have soft blood pressures postoperatively for which lisinopril was temporarily held.  Prior to mission aspirin dose was increased temporarily for DVT prophylaxis, per orthopedics.  She was incidentally found to have a 5 mm right MCA bifurcation aneurysm for which outpatient evaluation was recommended.  CT scan revealed a partially imaged uterine lesion, also seen in June 2022.  Outpatient GYN follow-up was recommended.  Finally, the patient was also noted cholelithiasis on CT scan.  LFTs were mildly elevated but stable during hospital course.  The patient had no GI symptoms.    Patient is unable to contribute to history secondary to cognitive deficits.  Nursing staff are not aware of any acute medical issues.      CODE STATUS/ADVANCE DIRECTIVES DISCUSSION:  Full Code  CPR/Full code   ALLERGIES: No Known Allergies   PAST MEDICAL HISTORY:   Past Medical History:   Diagnosis Date     Dyslipidemia      Hypertension       PAST SURGICAL HISTORY:   has a past surgical history that includes Pr Closed Rx Mid Humerus Fracture; Pr  Closed Rx Dist Rad/Ulna Fx,Manipul; REMOVE TONSILS/ADENOIDS,<13 Y/O; Open reduction internal fixation hip unipolar (Left, 6/5/2022); and Open reduction internal fixation femur proximal (Right, 1/16/2023).  FAMILY HISTORY: family history includes Alzheimer Disease in her sister; Breast Cancer in her sister; Cancer in her brother; Multiple Sclerosis in her mother; Pancreatic Cancer in her father.  SOCIAL HISTORY:   reports that she quit smoking about 47 years ago. She has never used smokeless tobacco. She reports current alcohol use. She reports that she does not use drugs.  Patient's living condition: lives with family, son     Current medications were reviewed by me today    Current Outpatient Medications   Medication Sig     acetaminophen (TYLENOL) 325 MG tablet Take 2 tablets (650 mg) by mouth every 4 hours as needed for other (For optimal non-opioid multimodal pain management to improve pain control.)     aspirin (ASA) 325 MG tablet Take 1 tablet (325 mg) by mouth daily for 42 days     atorvastatin (LIPITOR) 40 MG tablet Take 0.5 tablets (20 mg) by mouth daily     donepezil (ARICEPT) 5 MG tablet Take 1 tablet (5 mg) by mouth At Bedtime     dorzolamide-timolol (COSOPT) 2-0.5 % ophthalmic solution Place 1 drop Into the left eye 2 times daily     latanoprost (XALATAN) 0.005 % ophthalmic solution instill 1 drop in both eyes at bedtime     lisinopril (ZESTRIL) 10 MG tablet Take 1 tablet (10 mg) by mouth daily     multivitamin (THERMEMS) TABS Take 1 tablet by mouth daily     ondansetron (ZOFRAN ODT) 4 MG ODT tab Take 1 tablet (4 mg) by mouth every 6 hours as needed for nausea or vomiting     oxyCODONE (ROXICODONE) 5 MG tablet Take 1 tablet (5 mg) by mouth every 4 hours as needed for severe pain (7-10)     polyethylene glycol (MIRALAX) 17 GM/Dose powder Take 17 g by mouth daily     senna-docusate (SENOKOT-S/PERICOLACE) 8.6-50 MG tablet Take 1 tablet by mouth 2 times daily     triamcinolone (KENALOG) 0.1 % external cream  "Apply topically 2 times daily as needed (eczema spots on legs)     No current facility-administered medications for this visit.       ROS:  Unobtainable secondary to cognitive impairment.     Vitals:  /58   Pulse 81   Temp 97.6  F (36.4  C)   Resp 17   Ht 1.702 m (5' 7\")   Wt 62.4 kg (137 lb 8 oz)   LMP  (LMP Unknown)   SpO2 96%   BMI 21.54 kg/m    Exam:  Thin appearing female, sitting in the common area, preparing to eat breakfast.  She appears comfortable.  HEENT: Oral mucosa moist  No eye redness or drainage  Lungs clear  CV RRR  Abdomen soft, nondistended, no right upper quadrant tenderness  Extremities: Right hip incision was not examined by me.  There is no lower extremity edema  Neuro: Alert, oriented to self.  Speech mildly dysarthric.  Face symmetric.  No tremors.    Lab/Diagnostic data:    Most Recent 3 CBC's:Recent Labs   Lab Test 01/19/23  0645 01/18/23  0729 01/17/23  0609 01/15/23  1836   WBC 8.3 11.0  --  7.5   HGB 13.4 12.5 12.2 14.4   MCV 89 86  --  91    142*  --  194     Most Recent 3 BMP's:Recent Labs   Lab Test 01/20/23  0854 01/19/23  0645 01/18/23  1226    138 139   POTASSIUM 3.6 3.7 3.8   CHLORIDE 104 106 105   CO2 23 23 26   BUN 12.3 13.7 18.3   CR 0.56 0.45* 0.53   ANIONGAP 13 9 8   ERICA 9.1 8.5* 8.7*   * 90 125*     Most Recent 2 LFT's:Recent Labs   Lab Test 02/01/23  1022 01/26/23  1046   * 155*   * 233*   ALKPHOS 186* 196*   BILITOTAL 0.7 0.7       ASSESSMENT/PLAN:    Right femoral neck fracture suffered in a mechanical fall, status post ORIF.  Postoperative course medically uncomplicated.  Plan: Therapies, cautious use of pain medications.  Higher strength aspirin for short period per orthopedics then resume 81 mg dose  Orthopedics follow-up    Dementia  Stable mental functional status following injury and surgery  Plan: Therapies.  Continue donepezil.  Cautious use of CNS acting medications    Elevated transaminases, incidental finding of " cholelithiasis on CT scan.  No complaints of abdominal pain, exam completely benign.  Relationship of LFT abnormalities and cholelithiasis not clear in absence of symptoms  Transaminases are trending down  Plan: Continue to monitor liver function tests, GI exam    Incidental right MCA bifurcation aneurysm  Plan: Endovascular neuroradiology follow-up if family desires    Incidental uterine lesion  Etiology unclear  No reported vaginal bleeding  Plan: Outpatient GYN follow-up with patient's family desires    Hypertension  Stable on lisinopril  Plan: Monitor blood pressure, continue blood pressure medications as blood pressure dictates.  Avoid hypotension.      Krishna Steward MD                Sincerely,        Krishna Steward MD

## 2023-02-09 ENCOUNTER — DISCHARGE SUMMARY NURSING HOME (OUTPATIENT)
Dept: GERIATRICS | Facility: CLINIC | Age: 88
End: 2023-02-09
Payer: MEDICARE

## 2023-02-09 VITALS
TEMPERATURE: 97.9 F | HEART RATE: 73 BPM | RESPIRATION RATE: 18 BRPM | HEIGHT: 67 IN | BODY MASS INDEX: 20.51 KG/M2 | OXYGEN SATURATION: 98 % | WEIGHT: 130.7 LBS | DIASTOLIC BLOOD PRESSURE: 61 MMHG | SYSTOLIC BLOOD PRESSURE: 100 MMHG

## 2023-02-09 DIAGNOSIS — R53.81 PHYSICAL DECONDITIONING: ICD-10-CM

## 2023-02-09 DIAGNOSIS — F02.80 ALZHEIMER'S DISEASE (H): ICD-10-CM

## 2023-02-09 DIAGNOSIS — H40.9 GLAUCOMA, UNSPECIFIED GLAUCOMA TYPE, UNSPECIFIED LATERALITY: ICD-10-CM

## 2023-02-09 DIAGNOSIS — E78.5 HYPERLIPIDEMIA, UNSPECIFIED HYPERLIPIDEMIA TYPE: ICD-10-CM
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
## 2023-02-09 DIAGNOSIS — G30.9 ALZHEIMER'S DISEASE (H): ICD-10-CM

## 2023-02-09 DIAGNOSIS — M62.81 GENERALIZED MUSCLE WEAKNESS: ICD-10-CM

## 2023-02-09 DIAGNOSIS — S72.002D CLOSED FRACTURE OF NECK OF LEFT FEMUR WITH ROUTINE HEALING, SUBSEQUENT ENCOUNTER: ICD-10-CM

## 2023-02-09 DIAGNOSIS — R09.81 NASAL CONGESTION: ICD-10-CM

## 2023-02-09 DIAGNOSIS — S72.001D CLOSED FRACTURE OF NECK OF RIGHT FEMUR WITH ROUTINE HEALING, SUBSEQUENT ENCOUNTER: Primary | ICD-10-CM

## 2023-02-09 DIAGNOSIS — K59.01 SLOW TRANSIT CONSTIPATION: ICD-10-CM

## 2023-02-09 DIAGNOSIS — J34.89 RHINORRHEA: ICD-10-CM

## 2023-02-09 DIAGNOSIS — I10 HYPERTENSION, UNSPECIFIED TYPE: ICD-10-CM

## 2023-02-09 PROCEDURE — 99316 NF DSCHRG MGMT 30 MIN+: CPT

## 2023-02-09 NOTE — PROGRESS NOTES
[facility] GERIATRICS DISCHARGE SUMMARY  PATIENT'S NAME: Khloe Sparks  YOB: 1935  MEDICAL RECORD NUMBER:  [id number]  Place of Service where encounter took place:  [facility] [[id number]]    PRIMARY CARE PROVIDER AND CLINIC RESPONSIBLE AFTER TRANSFER:   Viri Akbar MD, [address] / [place] [address]    [facility] Provider     Transferring providers: Karsten Soni DNP  APRN CNP; Krishna Steward MD  Recent Hospitalization/ED:  [facility] Hospital stay 1/15/23 to 1/20/23.  Date of SNF Admission: January 20, 2023  Date of SNF (anticipated) Discharge: 2/9/23  Discharged to: previous assisted living  Cognitive Scores: CPT:3.5/5.6   Physical Function:  [place]inetti 17/28, ambulated up to 300 feet  DME: No new DME needed    CODE STATUS/ADVANCE DIRECTIVES DISCUSSION:  Full Code   ALLERGIES: Patient has no known allergies.     Summary of nursing facility stay:   Past medical history includes hypertension, dyslipidemia, repeated falls, and Alzheimer dementia presented to the hospital following a fall.  Found to have acute traumatic right femoral neck fracture now s/p ORIF.  Of note, she also sustained left femoral neck fracture following a fall in 6/2022 and s/p left hip hemiarthroplasty on 6/5/22.  CT head and lumbar spine negative for acute changes at this admission.  Recommended to have bone health evaluation as outpatient.  Incidentally found to have right MCA bifurcation aneurysm, spinal canal stenosis at L4-L5, cholelithiasis, and uterine lesion-recommended outpatient follow-up.  Patient discharged to [facility] TCU on stable condition for rehab.     Patient did fine throughout her TCU stay.  History limited secondary to underlying cognitive deficits.  Able to ambulate up to 300 feet with therapy.  No falls or injuries in TCU.  Appetite at baseline.  Hemodynamically stable. Surgical incision without signs of complications.  She is  discharging back to her previous assited living facility with orders in place for PT/OT/speech therapy.  Staff without acute concerns.    Assessment and Plan  Acute traumatic right femoral neck fracture   Hx closed fracture of neck of left femur in 6/2022 post repair   Fall  General weakness  Physical deconditioning  Frail elderly  -S/p ORIF of the right femoral neck fracture with FNS system on 1/16/23, no pain, incision intact  -Continue Tylenol and  lidocaine patch, continue  aspirin for DVT prophylaxis, OT/PT, Ortho follow-up, monitor incision, monitor pain, fall precautions    Rhinorrhea, resolved   Congestion, resolved   -Covid negative, CXR negative   -Monitor for changes in respiratory status     Incidental right MCA bifurcation aneurysm on CT head  -Outpatient endovascular neuroradiology follow-up     Incidental spinal canal stenosis at L4-L5  -Outpatient orthospine follow-up     Incidental cholelithiasis  -PCP and surgery follow-up     Incidental uterine lesion  -Outpatient gynecologyl follow-up     Hypertension  -BP stable on PTA lisinopril     Alzheimer's dementia  -Stable mental and functional status  -Continue PTA donepezil, monitor mood     Hyperlipidemia  -Managed on PTA Lipitor     Glaucoma  -Managed on eyedrops     Slow transit constipation  -Managed on senna and MiraLAX       Discharge Medications:  MED REC REQUIRED   Post Medication Reconciliation Status:  Medication reconciliation previously completed during another office visit    Current Outpatient Medications   Medication Sig Dispense Refill     acetaminophen (TYLENOL) 325 MG tablet Take 2 tablets (650 mg) by mouth every 4 hours as needed for other (For optimal non-opioid multimodal pain management to improve pain control.) 100 tablet 0     aspirin (ASA) 325 MG tablet Take 1 tablet (325 mg) by mouth daily for 42 days 42 tablet 0     atorvastatin (LIPITOR) 40 MG tablet Take 0.5 tablets (20 mg) by mouth daily 90 tablet 3     donepezil (ARICEPT)  "5 MG tablet Take 1 tablet (5 mg) by mouth At Bedtime 90 tablet 3     dorzolamide-timolol (COSOPT) 2-0.5 % ophthalmic solution Place 1 drop Into the left eye 2 times daily  1     latanoprost (XALATAN) 0.005 % ophthalmic solution instill 1 drop in both eyes at bedtime  3     lisinopril (ZESTRIL) 10 MG tablet Take 1 tablet (10 mg) by mouth daily 90 tablet 3     multivitamin (THERMEMS) TABS Take 1 tablet by mouth daily       ondansetron (ZOFRAN ODT) 4 MG ODT tab Take 1 tablet (4 mg) by mouth every 6 hours as needed for nausea or vomiting 5 tablet 0     oxyCODONE (ROXICODONE) 5 MG tablet Take 1 tablet (5 mg) by mouth every 4 hours as needed for severe pain (7-10) 20 tablet 0     polyethylene glycol (MIRALAX) 17 GM/Dose powder Take 17 g by mouth daily 510 g 3     senna-docusate (SENOKOT-S/PERICOLACE) 8.6-50 MG tablet Take 1 tablet by mouth 2 times daily 14 tablet 0     triamcinolone (KENALOG) 0.1 % external cream Apply topically 2 times daily as needed (eczema spots on legs) 80 g 1     Controlled medications:   not applicable/none     Past Medical History:   Past Medical History:   Diagnosis Date     Dyslipidemia      Hypertension      Physical Exam:   Vitals: /61   Pulse 73   Temp 97.9  F (36.6  C)   Resp 18   Ht 1.702 m (5' 7\")   Wt 59.3 kg (130 lb 11.2 oz)   LMP  (LMP Unknown)   SpO2 98%   BMI 20.47 kg/m    BMI: Body mass index is 20.47 kg/m .     Exam:  GENERAL APPEARANCE:  No acute distress  HEENT-EARS: No discharge/drainage  HEENT-NECK: No lymphadenopathy  HEENT-EYES: PERRLA positive, no drainage/discharge  HEENT-NOSE/MOUTH/THROAT:  No nasal discharge or drainage, mucous membranes moist  RESPIRATORY:  Lung sounds diminished  CARDIOVASCULAR: RRR, no peripheral edema, S1/S2 normal  GASTROINTESTINAL: Soft, nontender, nondistended, bowel sounds present x4 quadrants  MUSCULOSKELETAL: Right hip surgical incision intact without complications  NEUROLOGICAL: Alert to self and surroundings  PSYCHOLOGICAL: " Roland, calm      SNF labs: Recent labs in [facility] reviewed by me today.     DISCHARGE PLAN:    Follow up labs: Defer to PCP    Medical Follow Up:     Follow up with primary care provider in 1 week  Follow up with Ortho      Discharge Services: Home Care:  Occupational Therapy and Physical Therapy, Speech therapy     Discharge Instructions Verbalized to Patient at Discharge:     Continue to follow your diet:  regular.     TOTAL DISCHARGE TIME:   Greater than 30 minutes  Electronically signed by:  Karsten Soni,JEEVAN,APRN,AGNP-BC.       Documentation of Face to Face and Certification for Home Health Services    I certify that patient: Khloe Sparks is under my care and that I, or a nurse practitioner or physician's assistant working with me, had a face-to-face encounter that meets the physician face-to-face encounter requirements with this patient on: 2/9/23.    This encounter with the patient was in whole, or in part, for the following medical condition, which is the primary reason for home health care:   -Acute traumatic right femoral neck fracture   -Hx closed fracture of neck of left femur in 6/2022 post repair   -Fall  -General weakness  -Physical deconditioning    I certify that, based on my findings, the following services are medically necessary home health services: Occupational Therapy, Physical Therapy and Speech Language Therapy.    My clinical findings support the need for the above services because: Occupational Therapy Services are needed to assess and treat cognitive ability and address ADL safety due to acute traumatic right femoral neck fracture, generalized weakness, and frailty; Physical Therapy Services are needed to assess and treat the following functional impairments: physical deconditioning, generalized weakness, frailty and use of walker ; Speech Therapy Services are needed to assess and treat impairments in language and/or swallow functions due to cognitive impairment.      Further, I  certify that my clinical findings support that this patient is homebound (i.e. absences from home require considerable and taxing effort and are for medical reasons or [employer] services or infrequently or of short duration when for other reasons) because: Requires assistance of another person or specialized equipment to access medical services because patient unable to exit home safely on own due to: acute traumatic right femoral neck fracture generalized weakness, physical deconditioning, fall risk and frailty.      Based on the above findings, I certify that this patient is confined to the home and needs intermittent physical therapy, occupational therapy, and speech therapy.  The patient is under my care, and I have initiated the establishment of the plan of care.  This patient will be followed by a physician who will periodically review the plan of care.  MPhysician/Provider to provide follow up care: Viri Akbar       Electronically signed by:  Karsten Soni DNP,APRN,KELLYP-BC.    2/9/23        The above note was completed in part using Dragon voice recognition software. Although reviewed after completion, some word and grammatical errors may occur. Please contact the author of this note with any questions.

## 2023-02-09 NOTE — LETTER
2/9/2023        RE: Khloe Sparks  [address]  [facility] [address]        [facility] GERIATRICS DISCHARGE SUMMARY  PATIENT'S NAME: Khloe Sparks  YOB: 1935  MEDICAL RECORD NUMBER:  [id number]  Place of Service where encounter took place:  [facility] (U) [[id number]]    PRIMARY CARE PROVIDER AND CLINIC RESPONSIBLE AFTER TRANSFER:   Viri Akbar MD, [address] / [place] [address]    [facility] [place]     Transferring providers: Karsten Soni DNP  APRN CNP; Krishna Steward MD  Recent Hospitalization/ED:  [facility] Hospital stay 1/15/23 to 1/20/23.  Date of SNF Admission: January 20, 2023  Date of SNF (anticipated) Discharge: 2/9/23  Discharged to: previous assisted living  Cognitive Scores: CPT:3.5/5.6   Physical Function:  Tinetti 17/28, ambulated up to 300 feet  DME: No new DME needed    CODE STATUS/ADVANCE DIRECTIVES DISCUSSION:  Full Code   ALLERGIES: Patient has no known allergies.     Summary of nursing facility stay:   Past medical history includes hypertension, dyslipidemia, repeated falls, and Alzheimer dementia presented to the hospital following a fall.  Found to have acute traumatic right femoral neck fracture now s/p ORIF.  Of note, she also sustained left femoral neck fracture following a fall in 6/2022 and s/p left hip hemiarthroplasty on 6/5/22.  CT head and lumbar spine negative for acute changes at this admission.  Recommended to have bone health evaluation as outpatient.  Incidentally found to have right MCA bifurcation aneurysm, spinal canal stenosis at L4-L5, cholelithiasis, and uterine lesion-recommended outpatient follow-up.  Patient discharged to [facility] TCU on stable condition for rehab.     Patient did fine throughout her TCU stay.  History limited secondary to underlying cognitive deficits.  Able to ambulate up to 300 feet with therapy.  No falls or injuries in TCU.  Appetite at  baseline.  Hemodynamically stable. Surgical incision without signs of complications.  She is discharging back to her previous assited living facility with orders in place for PT/OT/speech therapy.  Staff without acute concerns.    Assessment and Plan  Acute traumatic right femoral neck fracture   Hx closed fracture of neck of left femur in 6/2022 post repair   Fall  General weakness  Physical deconditioning  Frail elderly  -S/p ORIF of the right femoral neck fracture with FNS system on 1/16/23, no pain, incision intact  -Continue Tylenol and  lidocaine patch, continue  aspirin for DVT prophylaxis, OT/PT, Ortho follow-up, monitor incision, monitor pain, fall precautions    Rhinorrhea, resolved   Congestion, resolved   -Covid negative, CXR negative   -Monitor for changes in respiratory status     Incidental right MCA bifurcation aneurysm on CT head  -Outpatient endovascular neuroradiology follow-up     Incidental spinal canal stenosis at L4-L5  -Outpatient orthospine follow-up     Incidental cholelithiasis  -PCP and surgery follow-up     Incidental uterine lesion  -Outpatient gynecologyl follow-up     Hypertension  -BP stable on PTA lisinopril     Alzheimer's dementia  -Stable mental and functional status  -Continue PTA donepezil, monitor mood     Hyperlipidemia  -Managed on PTA Lipitor     Glaucoma  -Managed on eyedrops     Slow transit constipation  -Managed on senna and MiraLAX       Discharge Medications:  MED REC REQUIRED   Post Medication Reconciliation Status:  Medication reconciliation previously completed during another office visit    Current Outpatient Medications   Medication Sig Dispense Refill     acetaminophen (TYLENOL) 325 MG tablet Take 2 tablets (650 mg) by mouth every 4 hours as needed for other (For optimal non-opioid multimodal pain management to improve pain control.) 100 tablet 0     aspirin (ASA) 325 MG tablet Take 1 tablet (325 mg) by mouth daily for 42 days 42 tablet 0     atorvastatin  "(LIPITOR) 40 MG tablet Take 0.5 tablets (20 mg) by mouth daily 90 tablet 3     donepezil (ARICEPT) 5 MG tablet Take 1 tablet (5 mg) by mouth At Bedtime 90 tablet 3     dorzolamide-timolol (COSOPT) 2-0.5 % ophthalmic solution Place 1 drop Into the left eye 2 times daily  1     latanoprost (XALATAN) 0.005 % ophthalmic solution instill 1 drop in both eyes at bedtime  3     lisinopril (ZESTRIL) 10 MG tablet Take 1 tablet (10 mg) by mouth daily 90 tablet 3     multivitamin (THERMEMS) TABS Take 1 tablet by mouth daily       ondansetron (ZOFRAN ODT) 4 MG ODT tab Take 1 tablet (4 mg) by mouth every 6 hours as needed for nausea or vomiting 5 tablet 0     oxyCODONE (ROXICODONE) 5 MG tablet Take 1 tablet (5 mg) by mouth every 4 hours as needed for severe pain (7-10) 20 tablet 0     polyethylene glycol (MIRALAX) 17 GM/Dose powder Take 17 g by mouth daily 510 g 3     senna-docusate (SENOKOT-S/PERICOLACE) 8.6-50 MG tablet Take 1 tablet by mouth 2 times daily 14 tablet 0     triamcinolone (KENALOG) 0.1 % external cream Apply topically 2 times daily as needed (eczema spots on legs) 80 g 1     Controlled medications:   not applicable/none     Past Medical History:   Past Medical History:   Diagnosis Date     Dyslipidemia      Hypertension      Physical Exam:   Vitals: /61   Pulse 73   Temp 97.9  F (36.6  C)   Resp 18   Ht 1.702 m (5' 7\")   Wt 59.3 kg (130 lb 11.2 oz)   LMP  (LMP Unknown)   SpO2 98%   BMI 20.47 kg/m    BMI: Body mass index is 20.47 kg/m .     Exam:  GENERAL APPEARANCE:  No acute distress  HEENT-EARS: No discharge/drainage  HEENT-NECK: No lymphadenopathy  HEENT-EYES: PERRLA positive, no drainage/discharge  HEENT-NOSE/MOUTH/THROAT:  No nasal discharge or drainage, mucous membranes moist  RESPIRATORY:  Lung sounds diminished  CARDIOVASCULAR: RRR, no peripheral edema, S1/S2 normal  GASTROINTESTINAL: Soft, nontender, nondistended, bowel sounds present x4 quadrants  MUSCULOSKELETAL: Right hip surgical " incision intact without complications  NEUROLOGICAL: Alert to self and surroundings  PSYCHOLOGICAL: Cooperative, calm      SNF labs: Recent labs in EPIC reviewed by me today.     DISCHARGE PLAN:    Follow up labs: Defer to PCP    Medical Follow Up:     Follow up with primary care provider in 1 week  Follow up with Ortho      Discharge Services: Home Care:  Occupational Therapy and Physical Therapy, Speech therapy     Discharge Instructions Verbalized to Patient at Discharge:     Continue to follow your diet:  regular.     TOTAL DISCHARGE TIME:   Greater than 30 minutes  Electronically signed by:  Karsten Soni,DNP,APRN,AGNP-BC.       Documentation of Face to Face and Certification for Home Health Services    I certify that patient: Khloe Sparks is under my care and that I, or a nurse practitioner or physician's assistant working with me, had a face-to-face encounter that meets the physician face-to-face encounter requirements with this patient on: 2/9/23.    This encounter with the patient was in whole, or in part, for the following medical condition, which is the primary reason for home health care:   -Acute traumatic right femoral neck fracture   -Hx closed fracture of neck of left femur in 6/2022 post repair   -Fall  -General weakness  -Physical deconditioning    I certify that, based on my findings, the following services are medically necessary home health services: Occupational Therapy, Physical Therapy and Speech Language Therapy.    My clinical findings support the need for the above services because: Occupational Therapy Services are needed to assess and treat cognitive ability and address ADL safety due to acute traumatic right femoral neck fracture, generalized weakness, and frailty; Physical Therapy Services are needed to assess and treat the following functional impairments: physical deconditioning, generalized weakness, frailty and use of walker ; Speech Therapy Services are needed to assess and  treat impairments in language and/or swallow functions due to cognitive impairment.      Further, I certify that my clinical findings support that this patient is homebound (i.e. absences from home require considerable and taxing effort and are for medical reasons or Nondenominational services or infrequently or of short duration when for other reasons) because: Requires assistance of another person or specialized equipment to access medical services because patient unable to exit home safely on own due to: acute traumatic right femoral neck fracture generalized weakness, physical deconditioning, fall risk and frailty.      Based on the above findings, I certify that this patient is confined to the home and needs intermittent physical therapy, occupational therapy, and speech therapy.  The patient is under my care, and I have initiated the establishment of the plan of care.  This patient will be followed by a physician who will periodically review the plan of care.  MPhysician/Provider to provide follow up care: Viri Akbar       Electronically signed by:  Karsten Soni DNP,RAVINDRA,RAYMUNDO-BC.    2/9/23        The above note was completed in part using Dragon voice recognition software. Although reviewed after completion, some word and grammatical errors may occur. Please contact the author of this note with any questions.                      Sincerely,        RAVINDRA Anaya CNP

## 2023-02-14 ENCOUNTER — MEDICAL CORRESPONDENCE (OUTPATIENT)
Dept: HEALTH INFORMATION MANAGEMENT | Facility: CLINIC | Age: 88
End: 2023-02-14

## 2023-02-17 ENCOUNTER — TELEPHONE (OUTPATIENT)
Dept: FAMILY MEDICINE | Facility: CLINIC | Age: 88
End: 2023-02-17
Payer: MEDICARE

## 2023-02-17 ENCOUNTER — TELEPHONE (OUTPATIENT)
Dept: NEUROSURGERY | Facility: CLINIC | Age: 88
End: 2023-02-17
Payer: MEDICARE

## 2023-02-17 NOTE — TELEPHONE ENCOUNTER
Reason for Call: Request for an order or referral:    Order or referral being requested: Occupational therapy 1x a week for 3 weeks due to recent right head fracture.    Date needed: as soon as possible    Has the patient been seen by the PCP for this problem? Not Applicable    Additional comments: Call Arnol at [facility] for verbal orders.    Phone number Patient can be reached at:  Other phone number:  [phone]    Best Time:  Anytime    Can we leave a detailed message on this number?  YES    Call taken on 2/17/2023 at 4:21 PM by Tiffany Fisher

## 2023-02-20 ENCOUNTER — TELEPHONE (OUTPATIENT)
Dept: FAMILY MEDICINE | Facility: CLINIC | Age: 88
End: 2023-02-20
Payer: MEDICARE

## 2023-02-22 ENCOUNTER — PATIENT OUTREACH (OUTPATIENT)
Dept: CARE COORDINATION | Facility: CLINIC | Age: 88
End: 2023-02-22
Payer: MEDICARE

## 2023-02-22 DIAGNOSIS — Z53.9 DIAGNOSIS NOT YET DEFINED: Primary | ICD-10-CM

## 2023-02-22 PROCEDURE — 99207 PR MD CERTIFICATION HHA PATIENT: CPT | Performed by: INTERNAL MEDICINE

## 2023-02-22 NOTE — PROGRESS NOTES
Clinic Care Coordination Contact    Situation: Patient chart reviewed by care coordinator.    Background:   Patient was admitted to [facility] from 1/15/2023 to 1/20/2023 with a diagnoses of Mechanical fall, acute traumatic right femoral neck fracture from fall, incidental right MCA bifurcation aneurysm, incidental cholelithiasis, HTN Alzheimers disease with dementia and discharged to [facility] TCU    Assessment:   RNCC was not notified of TCU discharge and has not made outreach to patient.  However, patient has a 2/27/2023 PCP/TCU follow up visit, has [facility] Physical Therapy/OT/SLP services at her [facility] and is following plan of care as outlined by care team.    Plan/Recommendations:   No further care coordination outreaches at this time.     Tanvi Peña RN, BSN, PHN  Primary Care / Care Coordinator   [facility]  E-mail [email or web address]   [phone]

## 2023-03-06 ENCOUNTER — TELEPHONE (OUTPATIENT)
Dept: FAMILY MEDICINE | Facility: CLINIC | Age: 88
End: 2023-03-06
Payer: MEDICARE

## 2023-03-06 NOTE — TELEPHONE ENCOUNTER
Son calling to report that the patient still has her staples in from her hip replacement.   Son states that the patient went to a care center following her surgery. He states that he thinks that ortho visited the patient at the care center.  Per AVS from hospital, patient was to follow up with surgeons office 2 weeks after surgery.  Gave patient's son Alen phone number for [facility] care coordinator, Roberta.  Roberta Ackerman RN

## 2023-03-06 NOTE — TELEPHONE ENCOUNTER
Reason for Call:  Other call back    Detailed comments: Maribel from [facility] called today.    She is requesting verbal order from Dr Akbar at  [facility]    Non visit discharge this week for OT due to family client request.    0 visits this week.     Please contact her back today.  Thank you.    Phone Number Patient can be reached at: Other phone number:  [phone]*    Best Time: anytime today    Can we leave a detailed message on this number? YES    Call taken on 3/6/2023 at 9:25 AM by Yolanda Moreno

## 2023-03-09 ENCOUNTER — MEDICAL CORRESPONDENCE (OUTPATIENT)
Dept: HEALTH INFORMATION MANAGEMENT | Facility: CLINIC | Age: 88
End: 2023-03-09

## 2024-01-01 ENCOUNTER — LAB REQUISITION (OUTPATIENT)
Dept: LAB | Facility: CLINIC | Age: 89
End: 2024-01-01
Payer: MEDICARE

## 2024-01-01 ENCOUNTER — TRANSITIONAL CARE UNIT VISIT (OUTPATIENT)
Dept: GERIATRICS | Facility: CLINIC | Age: 89
End: 2024-01-01
Payer: MEDICARE

## 2024-01-01 ENCOUNTER — DOCUMENTATION ONLY (OUTPATIENT)
Dept: GERIATRICS | Facility: CLINIC | Age: 89
End: 2024-01-01
Payer: MEDICARE

## 2024-01-01 VITALS
TEMPERATURE: 97.3 F | DIASTOLIC BLOOD PRESSURE: 68 MMHG | SYSTOLIC BLOOD PRESSURE: 113 MMHG | BODY MASS INDEX: 16.9 KG/M2 | RESPIRATION RATE: 18 BRPM | OXYGEN SATURATION: 98 % | HEART RATE: 84 BPM | WEIGHT: 107.7 LBS | HEIGHT: 67 IN

## 2024-01-01 VITALS
RESPIRATION RATE: 18 BRPM | OXYGEN SATURATION: 100 % | SYSTOLIC BLOOD PRESSURE: 110 MMHG | TEMPERATURE: 97.5 F | HEIGHT: 67 IN | WEIGHT: 130 LBS | DIASTOLIC BLOOD PRESSURE: 61 MMHG | HEART RATE: 99 BPM | BODY MASS INDEX: 20.4 KG/M2

## 2024-01-01 VITALS
HEIGHT: 67 IN | TEMPERATURE: 97.5 F | WEIGHT: 100.7 LBS | SYSTOLIC BLOOD PRESSURE: 114 MMHG | HEART RATE: 70 BPM | OXYGEN SATURATION: 98 % | DIASTOLIC BLOOD PRESSURE: 77 MMHG | BODY MASS INDEX: 15.81 KG/M2 | RESPIRATION RATE: 18 BRPM

## 2024-01-01 DIAGNOSIS — M62.81 GENERALIZED MUSCLE WEAKNESS: ICD-10-CM

## 2024-01-01 DIAGNOSIS — R53.81 PHYSICAL DECONDITIONING: ICD-10-CM

## 2024-01-01 DIAGNOSIS — G30.9 ALZHEIMER'S DISEASE (H): ICD-10-CM

## 2024-01-01 DIAGNOSIS — N39.0 URINARY TRACT INFECTION WITHOUT HEMATURIA, SITE UNSPECIFIED: Primary | ICD-10-CM

## 2024-01-01 DIAGNOSIS — F02.80 ALZHEIMER'S DISEASE (H): ICD-10-CM

## 2024-01-01 DIAGNOSIS — E87.0 HYPERNATREMIA: ICD-10-CM

## 2024-01-01 DIAGNOSIS — E87.6 HYPOKALEMIA: ICD-10-CM

## 2024-01-01 DIAGNOSIS — I10 ESSENTIAL HYPERTENSION: ICD-10-CM

## 2024-01-01 DIAGNOSIS — R13.10 DYSPHAGIA, UNSPECIFIED TYPE: ICD-10-CM

## 2024-01-01 DIAGNOSIS — Z91.81 HISTORY OF FALLING: ICD-10-CM

## 2024-01-01 LAB
ANION GAP SERPL CALCULATED.3IONS-SCNC: 18 MMOL/L (ref 7–15)
BUN SERPL-MCNC: 10.8 MG/DL (ref 8–23)
CALCIUM SERPL-MCNC: 9.1 MG/DL (ref 8.8–10.2)
CHLORIDE SERPL-SCNC: 100 MMOL/L (ref 98–107)
CREAT SERPL-MCNC: 0.63 MG/DL (ref 0.51–0.95)
DEPRECATED HCO3 PLAS-SCNC: 20 MMOL/L (ref 22–29)
EGFRCR SERPLBLD CKD-EPI 2021: 85 ML/MIN/1.73M2
GLUCOSE SERPL-MCNC: 92 MG/DL (ref 70–99)
POTASSIUM SERPL-SCNC: 4.6 MMOL/L (ref 3.4–5.3)
SODIUM SERPL-SCNC: 138 MMOL/L (ref 135–145)

## 2024-01-01 PROCEDURE — 80048 BASIC METABOLIC PNL TOTAL CA: CPT | Performed by: INTERNAL MEDICINE

## 2024-01-01 PROCEDURE — 99309 SBSQ NF CARE MODERATE MDM 30: CPT

## 2024-01-01 PROCEDURE — 36415 COLL VENOUS BLD VENIPUNCTURE: CPT | Performed by: INTERNAL MEDICINE

## 2024-01-01 PROCEDURE — 99308 SBSQ NF CARE LOW MDM 20: CPT

## 2024-01-01 PROCEDURE — P9604 ONE-WAY ALLOW PRORATED TRIP: HCPCS | Performed by: INTERNAL MEDICINE

## 2024-04-03 NOTE — LETTER
"    4/3/2024        RE: Khloe Sparks  [address]  [facility] [address]        [facility] GERIATRICS    PRIMARY CARE PROVIDER AND CLINIC:  Viri Akbar MD, [address] / [facility] [address]  Chief Complaint   Patient presents with     Hospital F/U      [facility] Medical Record Number:  [id number]  Place of Service where encounter took place:  [facility] (U) [[id number]]    HPI:  Khloe Sparks  is a 88 year old  (1935), admitted to the above facility from  [facility] . Hospital stay 3/27/24 through 4/3/24..     Admitted for E. coli UTI. Past medical history significant for Alzheimer's dementia, hypertension, and history of falls with left femoral neck fracture in 6/2023. Hospitalization course as encrypted below from discharge summary.    [facility]  Hospitalist discharge summary  Admission Date: 3/27/2024  Discharge Date: 4/3/2024    Hospital course:  \"88 y.o. female with a history of Alzheimer's dementia and hypertension who lives with her son. She presented with weakness, inability to move without assistance, and an inability to eat or drink.  Patient was brought to the emergency department by her son who subsequently left.      Patient was alert, following commands, but  unable to communicate in a comprehensible manner. In the ED she tachycardic.  Labs showed hypernatremia with sodium 154, creatinine WNL. CT head unremarkable. CXR clear. Urinalysis consistent with infection, and she received IV ceftriaxone and admitted for further intervention.     Urine did culture pan sensitive E. Coli. Per son, her communication is at baseline; she is able to say \"yes\" but otherwise is aphasic. The patient was seen by palliative; goals remain restorative with DNR/DNI limitations. Sodium improved with IVF. SLP evaluated and recommended dysphagia diet. Weakness improved with treatment of UTI     Son was initially hoping the patient could " "discharge home; he is her primary caretaker. After further discussion, it was decided that TCU would be most beneficial.      She was able to discharge 4/3. Plan for one additional day of Augmentin on discharge. Lisinopril held. Continue therapies with PT and SLP.     E. Coli UTI  -Improved with antibiotics  -1 additional day of Augmentin on discharge  -Discharged to TCU     HTN  -Lisinopril held, BP has been controlled. Restart as outpatient when appropriate\".    Overall stabilized and patient discharged to the above TCU in stable condition for rehab.    Today: Patient being seen for initial TCU visit.  Patient found sitting up in chair in the common areas.  She is known to me from previous encounter.  Unable to offer history though able to to answer yes or no questions.  She is no any forms of distress.  Working with therapy.  Hemodynamically stable.  Tolerating current diet.  Followed by speech therapy.  No reports of difficulty swallowing in TCU.  No acute concerns at this time.    CODE STATUS/ADVANCE DIRECTIVES DISCUSSION: DNR/DNI  ALLERGIES: No Known Allergies   PAST MEDICAL HISTORY:   Past Medical History:   Diagnosis Date     Dyslipidemia      Hypertension       PAST SURGICAL HISTORY:   has a past surgical history that includes Pr Closed Rx Mid Humerus Fracture; Pr Closed Rx Dist Rad/Ulna Fx,Manipul; REMOVE TONSILS/ADENOIDS,<11 Y/O; Open reduction internal fixation hip unipolar (Left, 6/5/2022); and Open reduction internal fixation femur proximal (Right, 1/16/2023).  FAMILY HISTORY: family history includes Alzheimer Disease in her sister; Breast Cancer in her sister; Cancer in her brother; Multiple Sclerosis in her mother; Pancreatic Cancer in her father.  SOCIAL HISTORY:   reports that she quit smoking about 48 years ago. She has never used smokeless tobacco. She reports current alcohol use. She reports that she does not use drugs.  Patient's living condition: lives with family, Son     Post Discharge " "Medication Reconciliation Status:   MED REC REQUIRED  Post Medication Reconciliation Status: discharge medications reconciled, continue medications without change      Current Outpatient Medications   Medication Sig Dispense Refill     [START ON 4/4/2024] amoxicillin-clavulanate (AUGMENTIN) 875-125 MG tablet Take 1 tablet by mouth 2 times daily       atorvastatin (LIPITOR) 40 MG tablet Take 0.5 tablets (20 mg) by mouth daily 90 tablet 3     dorzolamide-timolol (COSOPT) 2-0.5 % ophthalmic solution Place 1 drop Into the left eye 2 times daily  1     latanoprost (XALATAN) 0.005 % ophthalmic solution instill 1 drop in both eyes at bedtime  3     multivitamin (THERMEMS) TABS Take 1 tablet by mouth daily       acetaminophen (TYLENOL) 325 MG tablet Take 2 tablets (650 mg) by mouth every 4 hours as needed for other (For optimal non-opioid multimodal pain management to improve pain control.) 100 tablet 0     donepezil (ARICEPT) 5 MG tablet Take 1 tablet (5 mg) by mouth At Bedtime 90 tablet 3     lisinopril (ZESTRIL) 10 MG tablet Take 1 tablet (10 mg) by mouth daily 90 tablet 3     ondansetron (ZOFRAN ODT) 4 MG ODT tab Take 1 tablet (4 mg) by mouth every 6 hours as needed for nausea or vomiting 5 tablet 0     oxyCODONE (ROXICODONE) 5 MG tablet Take 1 tablet (5 mg) by mouth every 4 hours as needed for severe pain (7-10) 20 tablet 0     polyethylene glycol (MIRALAX) 17 GM/Dose powder Take 17 g by mouth daily 510 g 3     senna-docusate (SENOKOT-S/PERICOLACE) 8.6-50 MG tablet Take 1 tablet by mouth 2 times daily 14 tablet 0     triamcinolone (KENALOG) 0.1 % external cream Apply topically 2 times daily as needed (eczema spots on legs) 80 g 1     No current facility-administered medications for this visit.       ROS:  Unobtainable secondary to cognitive impairment.     Vitals:  /61   Pulse 99   Temp 97.5  F (36.4  C)   Resp 18   Ht 1.702 m (5' 7\")   Wt 59 kg (130 lb)   LMP  (LMP Unknown)   SpO2 100%   BMI 20.36 kg/m  "     Exam:  GENERAL APPEARANCE: NAD  HEENT-EARS: No discharge/drainage  HEENT-NECK: No lymphadenopathy  HEENT-EYES: PERRLA positive, no drainage/discharge  HEENT-NOSE/MOUTH/THROAT: No nasal drainage or erythema, mucous membranes moist  RESPIRATORY: Clear to auscultation, even and unlabored, symmetrical chest wall expansion  CARDIOVASCULAR: RRR, no peripheral edema, S1/S2 normal  GASTROINTESTINAL: Soft, nontender, nondistended, bowel sounds present   MUSCULOSKELETAL: Moves all extremities fine   INTEGUMENTARY: Visualized areas intact  NEUROLOGICAL: Alert to self, memory loss, confusion  PSYCHOLOGICAL: Calm    Lab/Diagnostic data:  Recent labs in [facility] reviewed by me today.     ASSESSMENT/PLAN:  E. coli UTI  Dehydration  Hypernatremia  Generalized weakness  Deconditioning  -Treated with IV ceftriaxone and p.o. Augmentin, no reports of  symptoms in TCU, sodium 138 on 4/1  -Continue therapies, BMP, monitor for  symptoms and repeat UA/UC as indicated    Dysphagia  -Presented to the hospital with inability to eat or drink, currently on minced and moist texture diet with moderately thick liquid  -SLP follow-up and modify diet as tolerated    Hypertension  Hyperlipidemia  -Lisinopril held, BP stable  -Continue statin, monitor BP and resume lisinopril if indicated, BMP    Alzheimer's dementia without behavioral disturbance  -Mood stable, lives with son  -Assist with ADLs as indicated, therapies , monitor mood and behavior     History of falling with left femoral fracture s/p left hip hemiarthroplasty in 6/2023  -Fall precautions    Glaucoma  -Continue PTA eyedrops          Orders:  BMP      Electronically signed by:  Karsten Soni,JEEVAN,APRN,AGNP-BC.               The above note was completed in part using Dragon voice recognition software. Although reviewed after completion, some word and grammatical errors may occur. Please contact the author of this note with any questions.                     Sincerely,        Karsten CROOKS  RAVINDRA Soni CNP

## 2024-04-03 NOTE — PROGRESS NOTES
"[facility] GERIATRICS    PRIMARY CARE PROVIDER AND CLINIC:  Viri Akbar MD, [address] / [place] [address]  Chief Complaint   Patient presents with    Hospital F/U      [place] Medical Record Number:  [id number]  Place of Service where encounter took place:  [facility] (U) [[id number]]    HPI:  Khloe Sparks  is a 88 year old  (1935), admitted to the above facility from  [facility] . Hospital stay 3/27/24 through 4/3/24..     Admitted for E. coli UTI. Past medical history significant for Alzheimer's dementia, hypertension, and history of falls with left femoral neck fracture in 6/2023. Hospitalization course as encrypted below from discharge summary.    [facility]  Hospitalist discharge summary  Admission Date: 3/27/2024  Discharge Date: 4/3/2024    Hospital course:  \"88 y.o. female with a history of Alzheimer's dementia and hypertension who lives with her son. She presented with weakness, inability to move without assistance, and an inability to eat or drink.  Patient was brought to the emergency department by her son who subsequently left.      Patient was alert, following commands, but  unable to communicate in a comprehensible manner. In the ED she tachycardic.  Labs showed hypernatremia with sodium 154, creatinine WNL. CT head unremarkable. CXR clear. Urinalysis consistent with infection, and she received IV ceftriaxone and admitted for further intervention.     Urine did culture pan sensitive E. Coli. Per son, her communication is at baseline; she is able to say \"yes\" but otherwise is aphasic. The patient was seen by palliative; goals remain restorative with DNR/DNI limitations. Sodium improved with IVF. SLP evaluated and recommended dysphagia diet. Weakness improved with treatment of UTI     Son was initially hoping the patient could discharge home; he is her primary caretaker. After further discussion, it was decided that [facility] " "would be most beneficial.      She was able to discharge 4/3. Plan for one additional day of Augmentin on discharge. Lisinopril held. Continue therapies with PT and SLP.     E. Coli UTI  -Improved with antibiotics  -1 additional day of Augmentin on discharge  -Discharged to TCU     HTN  -Lisinopril held, BP has been controlled. Restart as outpatient when appropriate\".    Overall stabilized and patient discharged to the above TCU in stable condition for rehab.    Today: Patient being seen for initial TCU visit.  Patient found sitting up in chair in the common areas.  She is known to me from previous encounter.  Unable to offer history though able to to answer yes or no questions.  She is no any forms of distress.  Working with therapy.  Hemodynamically stable.  Tolerating current diet.  Followed by speech therapy.  No reports of difficulty swallowing in TCU.  No acute concerns at this time.    CODE STATUS/ADVANCE DIRECTIVES DISCUSSION: DNR/DNI  ALLERGIES: No Known Allergies   PAST MEDICAL HISTORY:   Past Medical History:   Diagnosis Date    Dyslipidemia     Hypertension       PAST SURGICAL HISTORY:   has a past surgical history that includes Pr Closed Rx Mid Humerus Fracture; Pr Closed Rx Dist Rad/Ulna Fx,Manipul; REMOVE TONSILS/ADENOIDS,<11 Y/O; Open reduction internal fixation hip unipolar (Left, 6/5/2022); and Open reduction internal fixation femur proximal (Right, 1/16/2023).  FAMILY HISTORY: family history includes Alzheimer Disease in her sister; Breast Cancer in her sister; Cancer in her brother; Multiple Sclerosis in her mother; Pancreatic Cancer in her father.  SOCIAL HISTORY:   reports that she quit smoking about 48 years ago. She has never used smokeless tobacco. She reports current alcohol use. She reports that she does not use drugs.  Patient's living condition: lives with family, Son     Post Discharge Medication Reconciliation Status:   MED REC REQUIRED  Post Medication Reconciliation Status: discharge " "medications reconciled, continue medications without change      Current Outpatient Medications   Medication Sig Dispense Refill    [START ON 4/4/2024] amoxicillin-clavulanate (AUGMENTIN) 875-125 MG tablet Take 1 tablet by mouth 2 times daily      atorvastatin (LIPITOR) 40 MG tablet Take 0.5 tablets (20 mg) by mouth daily 90 tablet 3    dorzolamide-timolol (COSOPT) 2-0.5 % ophthalmic solution Place 1 drop Into the left eye 2 times daily  1    latanoprost (XALATAN) 0.005 % ophthalmic solution instill 1 drop in both eyes at bedtime  3    multivitamin (THERMEMS) TABS Take 1 tablet by mouth daily      acetaminophen (TYLENOL) 325 MG tablet Take 2 tablets (650 mg) by mouth every 4 hours as needed for other (For optimal non-opioid multimodal pain management to improve pain control.) 100 tablet 0    donepezil (ARICEPT) 5 MG tablet Take 1 tablet (5 mg) by mouth At Bedtime 90 tablet 3    lisinopril (ZESTRIL) 10 MG tablet Take 1 tablet (10 mg) by mouth daily 90 tablet 3    ondansetron (ZOFRAN ODT) 4 MG ODT tab Take 1 tablet (4 mg) by mouth every 6 hours as needed for nausea or vomiting 5 tablet 0    oxyCODONE (ROXICODONE) 5 MG tablet Take 1 tablet (5 mg) by mouth every 4 hours as needed for severe pain (7-10) 20 tablet 0    polyethylene glycol (MIRALAX) 17 GM/Dose powder Take 17 g by mouth daily 510 g 3    senna-docusate (SENOKOT-S/PERICOLACE) 8.6-50 MG tablet Take 1 tablet by mouth 2 times daily 14 tablet 0    triamcinolone (KENALOG) 0.1 % external cream Apply topically 2 times daily as needed (eczema spots on legs) 80 g 1     No current facility-administered medications for this visit.       ROS:  Unobtainable secondary to cognitive impairment.     Vitals:  /61   Pulse 99   Temp 97.5  F (36.4  C)   Resp 18   Ht 1.702 m (5' 7\")   Wt 59 kg (130 lb)   LMP  (LMP Unknown)   SpO2 100%   BMI 20.36 kg/m      Exam:  GENERAL APPEARANCE: NAD  HEENT-EARS: No discharge/drainage  HEENT-NECK: No lymphadenopathy  HEENT-EYES: " PERRLA positive, no drainage/discharge  HEENT-NOSE/MOUTH/THROAT: No nasal drainage or erythema, mucous membranes moist  RESPIRATORY: Clear to auscultation, even and unlabored, symmetrical chest wall expansion  CARDIOVASCULAR: RRR, no peripheral edema, S1/S2 normal  GASTROINTESTINAL: Soft, nontender, nondistended, bowel sounds present   MUSCULOSKELETAL: Moves all extremities fine   INTEGUMENTARY: Visualized areas intact  NEUROLOGICAL: Alert to self, memory loss, confusion  PSYCHOLOGICAL: Calm    Lab/Diagnostic data:  Recent labs in [facility] reviewed by me today.     ASSESSMENT/PLAN:  E. coli UTI  Dehydration  Hypernatremia  Generalized weakness  Deconditioning  -Treated with IV ceftriaxone and p.o. Augmentin, no reports of  symptoms in TCU, sodium 138 on 4/1  -Continue therapies, BMP, monitor for  symptoms and repeat UA/UC as indicated    Dysphagia  -Presented to the hospital with inability to eat or drink, currently on minced and moist texture diet with moderately thick liquid  -SLP follow-up and modify diet as tolerated    Hypertension  Hyperlipidemia  -Lisinopril held, BP stable  -Continue statin, monitor BP and resume lisinopril if indicated, BMP    Alzheimer's dementia without behavioral disturbance  -Mood stable, lives with son  -Assist with ADLs as indicated, therapies , monitor mood and behavior     History of falling with left femoral fracture s/p left hip hemiarthroplasty in 6/2023  -Fall precautions    Glaucoma  -Continue PTA eyedrops          Orders:  BMP      Electronically signed by:  Karsten Soni,JEEVAN,APRN,AGNP-BC.               The above note was completed in part using Dragon voice recognition software. Although reviewed after completion, some word and grammatical errors may occur. Please contact the author of this note with any questions.

## 2024-04-08 NOTE — LETTER
"    4/8/2024        RE: Khloe Sparks  [address] [facility] [address]        [facility] GERIATRICS    Chief Complaint   Patient presents with     RECHECK     HPI:  Khloe Sparks is a 88 year old  (1935), who is being seen today for an episodic care visit at: [facility] (U) [[id number]]. Today's concern is: TCU Follow Up     Patient admitted to the hosptial 3/27-4/3/24 for E. coli UTI. Past medical history significant for Alzheimer's dementia, hypertension, and history of falls with left femoral neck fracture in 6/2023. Hospitalization course as encrypted below from discharge summary.     [facility]  Hospitalist discharge summary  Admission Date: 3/27/2024  Discharge Date: 4/3/2024     Hospital course:  \"88 y.o. female with a history of Alzheimer's dementia and hypertension who lives with her son. She presented with weakness, inability to move without assistance, and an inability to eat or drink.  Patient was brought to the emergency department by her son who subsequently left.      Patient was alert, following commands, but  unable to communicate in a comprehensible manner. In the ED she tachycardic.  Labs showed hypernatremia with sodium 154, creatinine WNL. CT head unremarkable. CXR clear. Urinalysis consistent with infection, and she received IV ceftriaxone and admitted for further intervention.     Urine did culture pan sensitive E. Coli. Per son, her communication is at baseline; she is able to say \"yes\" but otherwise is aphasic. The patient was seen by palliative; goals remain restorative with DNR/DNI limitations. Sodium improved with IVF. SLP evaluated and recommended dysphagia diet. Weakness improved with treatment of UTI     Son was initially hoping the patient could discharge home; he is her primary caretaker. After further discussion, it was decided that TCU would be most beneficial.      She was able to discharge 4/3. Plan for one " "additional day of Augmentin on discharge. Lisinopril held. Continue therapies with PT and SLP.     E. Coli UTI  -Improved with antibiotics  -1 additional day of Augmentin on discharge  -Discharged to TCU     HTN  -Lisinopril held, BP has been controlled. Restart as outpatient when appropriate\".     Overall stabilized and patient discharged to the above TCU in stable condition for rehab.    Today: Patient being seen for TCU follow-up visit.  Found sitting up in the dining area.  Does not offer history though she states \"ok\" when asked how she is doing.  Working with therapy.  Hemodynamically stable.  No reports of chest pain or shortness of breath.  Course reviewed with staff and no acute concerns.      Allergies, and PMH/PSH reviewed in [facility] today.  REVIEW OF SYSTEMS:  Unobtainable secondary to cognitive impairment.     Objective:   /68   Pulse 84   Temp 97.3  F (36.3  C)   Resp 18   Ht 1.702 m (5' 7\")   Wt 48.9 kg (107 lb 11.2 oz)   LMP  (LMP Unknown)   SpO2 98%   BMI 16.87 kg/m      Exam:  GENERAL APPEARANCE: NAD, up in chair   RESPIRATORY: Clear to auscultation, even and unlabored, symmetrical chest wall expansion  CARDIOVASCULAR: RRR, no peripheral edema, S1/S2 normal  GASTROINTESTINAL: Soft, nontender, nondistended, bowel sounds present    NEUROLOGICAL: Alert to self, memory loss, confusion  PSYCHOLOGICAL: Calm    Recent labs in [facility] reviewed by me today.     Assessment/Plan:  E. coli UTI  Generalized weakness  Deconditioning  -Treated with IV ceftriaxone and p.o. Augmentin, no reports of  symptoms in TCU  -Continue therapies, monitor for  symptoms and repeat UA/UC as indicated     Dysphagia  -Presented to the hospital with inability to eat or drink, currently on minced and moist texture diet with moderately thick liquid  -SLP follow-up and modify diet as tolerated    Alzheimer's dementia without behavioral disturbance  -Mood stable, lives with son  -Assist with ADLs as indicated, therapies , " monitor mood and behavior       MED REC REQUIRED  Post Medication Reconciliation Status: medication reconcilation previously completed during another office visit         Electronically signed by:   Karsten Soni DNP,RAVINDRA,RAYMUNDO-BC.             The above note was completed in part using Dragon voice recognition software. Although reviewed after completion, some word and grammatical errors may occur. Please contact the author of this note with any questions.           Sincerely,        RAVINDRA Anaya CNP

## 2024-04-08 NOTE — PROGRESS NOTES
"[facility] GERIATRICS    Chief Complaint   Patient presents with    RECHECK     HPI:  Khloe Sparks is a 88 year old  (1935), who is being seen today for an episodic care visit at: [facility] (U) [[id number]]. Today's concern is: TCU Follow Up     Patient admitted to the hosptial 3/27-4/3/24 for E. coli UTI. Past medical history significant for Alzheimer's dementia, hypertension, and history of falls with left femoral neck fracture in 6/2023. Hospitalization course as encrypted below from discharge summary.     [facility]  [facility] Date: 3/27/2024  Discharge Date: 4/3/2024     Hospital course:  \"88 y.o. female with a history of Alzheimer's dementia and hypertension who lives with her son. She presented with weakness, inability to move without assistance, and an inability to eat or drink.  Patient was brought to the emergency department by her son who subsequently left.      Patient was alert, following commands, but  unable to communicate in a comprehensible manner. In the ED she tachycardic.  Labs showed hypernatremia with sodium 154, creatinine WNL. CT head unremarkable. CXR clear. Urinalysis consistent with infection, and she received IV ceftriaxone and admitted for further intervention.     Urine did culture pan sensitive E. Coli. Per son, her communication is at baseline; she is able to say \"yes\" but otherwise is aphasic. The patient was seen by palliative; goals remain restorative with DNR/DNI limitations. Sodium improved with IVF. SLP evaluated and recommended dysphagia diet. Weakness improved with treatment of UTI     Son was initially hoping the patient could discharge home; he is her primary caretaker. After further discussion, it was decided that TCU would be most beneficial.      She was able to discharge 4/3. Plan for one additional day of Augmentin on discharge. Lisinopril held. Continue therapies with PT and SLP.     E. " "Coli UTI  -Improved with antibiotics  -1 additional day of Augmentin on discharge  -Discharged to TCU     HTN  -Lisinopril held, BP has been controlled. Restart as outpatient when appropriate\".     Overall stabilized and patient discharged to the above TCU in stable condition for rehab.    Today: Patient being seen for TCU follow-up visit.  Found sitting up in the dining area.  Does not offer history though she states \"ok\" when asked how she is doing.  Working with therapy.  Hemodynamically stable.  No reports of chest pain or shortness of breath.  Course reviewed with staff and no acute concerns.      Allergies, and PMH/PSH reviewed in [id number] today.  REVIEW OF SYSTEMS:  Unobtainable secondary to cognitive impairment.     Objective:   /68   Pulse 84   Temp 97.3  F (36.3  C)   Resp 18   Ht 1.702 m (5' 7\")   Wt 48.9 kg (107 lb 11.2 oz)   LMP  (LMP Unknown)   SpO2 98%   BMI 16.87 kg/m      Exam:  GENERAL APPEARANCE: NAD, up in chair   RESPIRATORY: Clear to auscultation, even and unlabored, symmetrical chest wall expansion  CARDIOVASCULAR: RRR, no peripheral edema, S1/S2 normal  GASTROINTESTINAL: Soft, nontender, nondistended, bowel sounds present    NEUROLOGICAL: Alert to self, memory loss, confusion  PSYCHOLOGICAL: Calm    Recent labs in [facility] reviewed by me today.     Assessment/Plan:  E. coli UTI  Generalized weakness  Deconditioning  -Treated with IV ceftriaxone and p.o. Augmentin, no reports of  symptoms in TCU  -Continue therapies, monitor for  symptoms and repeat UA/UC as indicated     Dysphagia  -Presented to the hospital with inability to eat or drink, currently on minced and moist texture diet with moderately thick liquid  -SLP follow-up and modify diet as tolerated    Alzheimer's dementia without behavioral disturbance  -Mood stable, lives with son  -Assist with ADLs as indicated, therapies , monitor mood and behavior       MED REC REQUIRED  Post Medication Reconciliation Status: medication " reconcilation previously completed during another office visit         Electronically signed by:   Karsten Soni,JEEVAN,RAVINDRA,LUIS.             The above note was completed in part using Dragon voice recognition software. Although reviewed after completion, some word and grammatical errors may occur. Please contact the author of this note with any questions.

## 2024-04-11 NOTE — PROGRESS NOTES
"Addendum:  At 1200 today, I received a message via triage that patient passed away. She was found unresponsive in her TCU room. Was DNR/DNI.       [facility] GERIATRICS    Chief Complaint   Patient presents with    RECHECK     HPI:  Khloe Sparks is a 88 year old  (1935), who is being seen today for an episodic care visit at: [facility] (U) [[id number]]. Today's concern is: TCU Follow Up     Patient admitted to the hosptial 3/27-4/3/24 for E. coli UTI. Past medical history significant for Alzheimer's dementia, hypertension, and history of falls with left femoral neck fracture in 6/2023. Hospitalization course as encrypted below from discharge summary.     [facility]  Hospitalist discharge summary  Admission Date: 3/27/2024  Discharge Date: 4/3/2024     Hospital course:  \"88 y.o. female with a history of Alzheimer's dementia and hypertension who lives with her son. She presented with weakness, inability to move without assistance, and an inability to eat or drink.  Patient was brought to the emergency department by her son who subsequently left.      Patient was alert, following commands, but  unable to communicate in a comprehensible manner. In the ED she tachycardic.  Labs showed hypernatremia with sodium 154, creatinine WNL. CT head unremarkable. CXR clear. Urinalysis consistent with infection, and she received IV ceftriaxone and admitted for further intervention.     Urine did culture pan sensitive E. Coli. Per son, her communication is at baseline; she is able to say \"yes\" but otherwise is aphasic. The patient was seen by palliative; goals remain restorative with DNR/DNI limitations. Sodium improved with IVF. SLP evaluated and recommended dysphagia diet. Weakness improved with treatment of UTI     Son was initially hoping the patient could discharge home; he is her primary caretaker. After further discussion, it was decided that TCU would be most beneficial.    " "  She was able to discharge 4/3. Plan for one additional day of Augmentin on discharge. Lisinopril held. Continue therapies with PT and SLP.     E. Coli UTI  -Improved with antibiotics  -1 additional day of Augmentin on discharge  -Discharged to TCU     HTN  -Lisinopril held, BP has been controlled. Restart as outpatient when appropriate\".     Overall stabilized and patient discharged to the above TCU in stable condition for rehab.    Today: Patient being seen for TCU follow-up visit. Found in the common areas with other residents playing [employer] this morning.   Does not offer reliable history due to underlying cognitive impairment. Working with therapy.  Hemodynamically stable.  No reports of chest pain or shortness of breath.  Course reviewed with staff and no acute concerns.    Allergies, and PMH/PSH reviewed in [facility] today.  REVIEW OF SYSTEMS:  Unobtainable secondary to cognitive impairment.     Objective:   /77   Pulse 70   Temp 97.5  F (36.4  C)   Resp 18   Ht 1.702 m (5' 7\")   Wt 45.7 kg (100 lb 11.2 oz)   LMP  (LMP Unknown)   SpO2 98%   BMI 15.77 kg/m      Exam:  GENERAL APPEARANCE: In no acute distress   RESPIRATORY: Clear to auscultation, even and unlabored, symmetrical chest wall expansion  CARDIOVASCULAR: RRR, no peripheral edema, S1/S2 normal  GASTROINTESTINAL: Soft, nontender, nondistended, bowel sounds present    NEUROLOGICAL: Alert to self, memory loss, confusion  PSYCHOLOGICAL: Calm    Recent labs in [facility] reviewed by me today.     Assessment/Plan:  E. coli UTI  Generalized weakness  -Treated with IV ceftriaxone and p.o. Augmentin, no reports of  symptoms in TCU  -Continue therapies, monitor for  symptoms and repeat UA/UC as indicated     Dysphagia  -Presented to the hospital with inability to eat or drink, currently on minced and moist texture diet with moderately thick liquid  -SLP follow-up and modify diet as tolerated    Alzheimer's dementia without behavioral " disturbance  -Mood stable, lives with son  -Assist with ADLs as indicated, therapies , monitor mood and behavior       MED REC REQUIRED  Post Medication Reconciliation Status: medication reconcilation previously completed during another office visit         Electronically signed by:   Karsten Soni DNP,APRN,KELLYP-BC.             The above note was completed in part using Dragon voice recognition software. Although reviewed after completion, some word and grammatical errors may occur. Please contact the author of this note with any questions.

## 2024-04-11 NOTE — LETTER
"    4/11/2024        RE: Khloe Sparks  [address] [facility] [id number]        [facility] GERIATRICS    Chief Complaint   Patient presents with     RECHECK     HPI:  Khloe Sparks is a 88 year old  (1935), who is being seen today for an episodic care visit at: [facility] [[id number]]. Today's concern is: TCU Follow Up     Patient admitted to the hosptial 3/27-4/3/24 for E. coli UTI. Past medical history significant for Alzheimer's dementia, hypertension, and history of falls with left femoral neck fracture in 6/2023. Hospitalization course as encrypted below from discharge summary.     [facility]  Hospitalist discharge summary  Admission Date: 3/27/2024  Discharge Date: 4/3/2024     Hospital course:  \"88 y.o. female with a history of Alzheimer's dementia and hypertension who lives with her son. She presented with weakness, inability to move without assistance, and an inability to eat or drink.  Patient was brought to the emergency department by her son who subsequently left.      Patient was alert, following commands, but  unable to communicate in a comprehensible manner. In the ED she tachycardic.  Labs showed hypernatremia with sodium 154, creatinine WNL. CT head unremarkable. CXR clear. Urinalysis consistent with infection, and she received IV ceftriaxone and admitted for further intervention.     Urine did culture pan sensitive E. Coli. Per son, her communication is at baseline; she is able to say \"yes\" but otherwise is aphasic. The patient was seen by palliative; goals remain restorative with DNR/DNI limitations. Sodium improved with IVF. SLP evaluated and recommended dysphagia diet. Weakness improved with treatment of UTI     Son was initially hoping the patient could discharge home; he is her primary caretaker. After further discussion, it was decided that TCU would be most beneficial.      She was able to discharge 4/3. Plan for one " "additional day of Augmentin on discharge. Lisinopril held. Continue therapies with PT and SLP.     E. Coli UTI  -Improved with antibiotics  -1 additional day of Augmentin on discharge  -Discharged to TCU     HTN  -Lisinopril held, BP has been controlled. Restart as outpatient when appropriate\".     Overall stabilized and patient discharged to the above TCU in stable condition for rehab.    Today: Patient being seen for TCU follow-up visit. Found in the common areas with other residents playing balloon "[employer]ball this morning.   Does not offer reliable history due to underlying cognitive impairment. Working with therapy.  Hemodynamically stable.  No reports of chest pain or shortness of breath.  Course reviewed with staff and no acute concerns.    Allergies, and PMH/PSH reviewed in [facility] today.  REVIEW OF SYSTEMS:  Unobtainable secondary to cognitive impairment.     Objective:   /77   Pulse 70   Temp 97.5  F (36.4  C)   Resp 18   Ht 1.702 m (5' 7\")   Wt 45.7 kg (100 lb 11.2 oz)   LMP  (LMP Unknown)   SpO2 98%   BMI 15.77 kg/m      Exam:  GENERAL APPEARANCE: In no acute distress   RESPIRATORY: Clear to auscultation, even and unlabored, symmetrical chest wall expansion  CARDIOVASCULAR: RRR, no peripheral edema, S1/S2 normal  GASTROINTESTINAL: Soft, nontender, nondistended, bowel sounds present    NEUROLOGICAL: Alert to self, memory loss, confusion  PSYCHOLOGICAL: Calm    Recent labs in [facility] reviewed by me today.     Assessment/Plan:  E. coli UTI  Generalized weakness  -Treated with IV ceftriaxone and p.o. Augmentin, no reports of  symptoms in TCU  -Continue therapies, monitor for  symptoms and repeat UA/UC as indicated     Dysphagia  -Presented to the hospital with inability to eat or drink, currently on minced and moist texture diet with moderately thick liquid  -SLP follow-up and modify diet as tolerated    Alzheimer's dementia without behavioral disturbance  -Mood stable, lives with son  -Assist " with ADLs as indicated, therapies , monitor mood and behavior       MED REC REQUIRED  Post Medication Reconciliation Status: medication reconcilation previously completed during another office visit         Electronically signed by:   Karsten Soni DNP,RAVINDRA,KELLYP-BC.             The above note was completed in part using Dragon voice recognition software. Although reviewed after completion, some word and grammatical errors may occur. Please contact the author of this note with any questions.              Sincerely,        RAVINDRA Anaya CNP

## (undated) DEVICE — GLOVE PROTEXIS W/NEU-THERA 8.0  2D73TE80

## (undated) DEVICE — IMM PILLOW ABDUCT HIP MED 31143061

## (undated) DEVICE — DRSG GAUZE 4X4" 3033

## (undated) DEVICE — CAST PADDING 6" STERILE 9046S

## (undated) DEVICE — DEVICE RETRIEVER HEWSON 71111579

## (undated) DEVICE — SUCTION IRR SYSTEM W/O TIP INTERPULSE HANDPIECE 0210-100-000

## (undated) DEVICE — ESU GROUND PAD UNIVERSAL W/O CORD

## (undated) DEVICE — GLOVE PROTEXIS POWDER FREE 8.5 ORTHOPEDIC 2D73ET85

## (undated) DEVICE — BLADE CLIPPER 4412A

## (undated) DEVICE — BIT DRL 413MM 4.3MM

## (undated) DEVICE — SUCTION CANISTER MEDIVAC LINER 3000ML W/LID 65651-530

## (undated) DEVICE — DRSG TEGADERM 4X10" 1627

## (undated) DEVICE — SYR 10ML LL W/O NDL 302995

## (undated) DEVICE — SOL NACL 0.9% IRRIG 1000ML BOTTLE 2F7124

## (undated) DEVICE — LINEN TOWEL PACK X5 5464

## (undated) DEVICE — SU VICRYL 2-0 CP-1 27" J466H

## (undated) DEVICE — SPONGE PACK VAGINAL 2"X9

## (undated) DEVICE — BONE CEMENT MIXEVAC III HI VAC KIT  0206-015-000

## (undated) DEVICE — BNDG ELASTIC 6"X5YDS UNSTERILE 6611-60

## (undated) DEVICE — BLADE SAW SAGITTAL STRK 18X90X1.37MM HD SYS 6 6118-137-090

## (undated) DEVICE — GLOVE PROTEXIS W/NEU-THERA 7.5  2D73TE75

## (undated) DEVICE — BRUSH INTRAMEDULLARY

## (undated) DEVICE — DRAIN ROUND W/RESERV KIT JACKSON PRATT 10FR 400ML SU130-402D

## (undated) DEVICE — GLOVE PROTEXIS POWDER FREE 8.0 ORTHOPEDIC 2D73ET80

## (undated) DEVICE — CAST PADDING 6" UNSTERILE 9046

## (undated) DEVICE — BONE CEMENT BIOPREP FEMORAL PREP PLUG INSERTER 0206-710-000

## (undated) DEVICE — PACK TOTAL HIP W/POUCH SOP15HPFSM

## (undated) DEVICE — BONE CEMENT MIXING KIT MAX CAPACITY OPTIVAC 41800

## (undated) DEVICE — GLOVE PROTEXIS W/NEU-THERA 8.5  2D73TE85

## (undated) DEVICE — WIRE GUIDE 3.2X400MM  357.399

## (undated) DEVICE — SU VICRYL 0 CP-1 27" J467H

## (undated) DEVICE — BONE CLEANING TIP INTERPULSE FEMORAL CANAL 0210-008-000

## (undated) DEVICE — BONE CLEANING TIP INTERPULSE  0210-010-000

## (undated) DEVICE — SOL WATER IRRIG 1000ML BOTTLE 2F7114

## (undated) DEVICE — DRAPE STERI TOWEL LG 1010

## (undated) DEVICE — PACK TOTAL HIP W/U DRAPE SOP15HUFSC

## (undated) DEVICE — DRAPE SHEET REV FOLD 3/4 9349

## (undated) DEVICE — DRSG KERLIX FLUFFS X5

## (undated) DEVICE — STPL SKIN 35W 6.9MM  PXW35

## (undated) DEVICE — DRSG XEROFORM 5X9" 8884431605

## (undated) DEVICE — DRAPE CV SPLIT II 147X106" 9158

## (undated) DEVICE — MANIFOLD NEPTUNE 4 PORT 700-20

## (undated) DEVICE — NDL 19GA 1.5"

## (undated) RX ORDER — CELECOXIB 200 MG/1
CAPSULE ORAL
Status: DISPENSED
Start: 2022-06-05

## (undated) RX ORDER — FENTANYL CITRATE 0.05 MG/ML
INJECTION, SOLUTION INTRAMUSCULAR; INTRAVENOUS
Status: DISPENSED
Start: 2023-01-16

## (undated) RX ORDER — BUPIVACAINE HYDROCHLORIDE 2.5 MG/ML
INJECTION, SOLUTION EPIDURAL; INFILTRATION; INTRACAUDAL
Status: DISPENSED
Start: 2022-06-05

## (undated) RX ORDER — TRANEXAMIC ACID 10 MG/ML
INJECTION, SOLUTION INTRAVENOUS
Status: DISPENSED
Start: 2022-06-05

## (undated) RX ORDER — FENTANYL CITRATE 50 UG/ML
INJECTION, SOLUTION INTRAMUSCULAR; INTRAVENOUS
Status: DISPENSED
Start: 2023-01-16

## (undated) RX ORDER — VANCOMYCIN HYDROCHLORIDE 1 G/20ML
INJECTION, POWDER, LYOPHILIZED, FOR SOLUTION INTRAVENOUS
Status: DISPENSED
Start: 2022-06-05

## (undated) RX ORDER — LIDOCAINE HYDROCHLORIDE 20 MG/ML
INJECTION, SOLUTION EPIDURAL; INFILTRATION; INTRACAUDAL; PERINEURAL
Status: DISPENSED
Start: 2022-06-05

## (undated) RX ORDER — TRANEXAMIC ACID 650 MG/1
TABLET ORAL
Status: DISPENSED
Start: 2023-01-16

## (undated) RX ORDER — EPHEDRINE SULFATE 50 MG/ML
INJECTION, SOLUTION INTRAMUSCULAR; INTRAVENOUS; SUBCUTANEOUS
Status: DISPENSED
Start: 2023-01-16

## (undated) RX ORDER — CEFAZOLIN SODIUM/WATER 2 G/20 ML
SYRINGE (ML) INTRAVENOUS
Status: DISPENSED
Start: 2022-06-05

## (undated) RX ORDER — TRANEXAMIC ACID 10 MG/ML
INJECTION, SOLUTION INTRAVENOUS
Status: DISPENSED
Start: 2023-01-16

## (undated) RX ORDER — DEXAMETHASONE SODIUM PHOSPHATE 4 MG/ML
INJECTION, SOLUTION INTRA-ARTICULAR; INTRALESIONAL; INTRAMUSCULAR; INTRAVENOUS; SOFT TISSUE
Status: DISPENSED
Start: 2022-06-05

## (undated) RX ORDER — FENTANYL CITRATE 50 UG/ML
INJECTION, SOLUTION INTRAMUSCULAR; INTRAVENOUS
Status: DISPENSED
Start: 2022-06-05

## (undated) RX ORDER — DEXAMETHASONE SODIUM PHOSPHATE 4 MG/ML
INJECTION, SOLUTION INTRA-ARTICULAR; INTRALESIONAL; INTRAMUSCULAR; INTRAVENOUS; SOFT TISSUE
Status: DISPENSED
Start: 2023-01-16

## (undated) RX ORDER — GLYCOPYRROLATE 0.2 MG/ML
INJECTION, SOLUTION INTRAMUSCULAR; INTRAVENOUS
Status: DISPENSED
Start: 2022-06-05

## (undated) RX ORDER — ONDANSETRON 2 MG/ML
INJECTION INTRAMUSCULAR; INTRAVENOUS
Status: DISPENSED
Start: 2022-06-05

## (undated) RX ORDER — ACETAMINOPHEN 325 MG/1
TABLET ORAL
Status: DISPENSED
Start: 2022-06-05